# Patient Record
Sex: MALE | Race: WHITE | Employment: OTHER | URBAN - METROPOLITAN AREA
[De-identification: names, ages, dates, MRNs, and addresses within clinical notes are randomized per-mention and may not be internally consistent; named-entity substitution may affect disease eponyms.]

---

## 2019-09-10 ENCOUNTER — HOSPITAL ENCOUNTER (OUTPATIENT)
Dept: NON INVASIVE DIAGNOSTICS | Age: 64
Discharge: HOME | End: 2019-09-10
Payer: MEDICARE

## 2019-09-10 LAB
LV EF: 58 %
LVEF MODALITY: NORMAL

## 2019-09-10 PROCEDURE — 93306 TTE W/DOPPLER COMPLETE: CPT

## 2019-10-03 LAB
AVERAGE GLUCOSE: 114
HBA1C MFR BLD: 5.6 %

## 2020-01-20 ENCOUNTER — OFFICE VISIT (OUTPATIENT)
Dept: FAMILY MEDICINE CLINIC | Age: 65
End: 2020-01-20
Payer: MEDICARE

## 2020-01-20 VITALS
OXYGEN SATURATION: 96 % | HEIGHT: 71 IN | SYSTOLIC BLOOD PRESSURE: 124 MMHG | TEMPERATURE: 98.3 F | RESPIRATION RATE: 18 BRPM | DIASTOLIC BLOOD PRESSURE: 72 MMHG | HEART RATE: 83 BPM | BODY MASS INDEX: 44.1 KG/M2 | WEIGHT: 315 LBS

## 2020-01-20 PROBLEM — N18.5: Status: ACTIVE | Noted: 2020-01-20

## 2020-01-20 PROBLEM — G47.33 OSA ON CPAP: Status: ACTIVE | Noted: 2020-01-20

## 2020-01-20 PROBLEM — Z99.89 OSA ON CPAP: Status: ACTIVE | Noted: 2020-01-20

## 2020-01-20 PROBLEM — Z99.2 DIALYSIS PATIENT (HCC): Status: ACTIVE | Noted: 2020-01-20

## 2020-01-20 PROBLEM — I10 ESSENTIAL HYPERTENSION: Status: ACTIVE | Noted: 2020-01-20

## 2020-01-20 PROBLEM — Q61.3 POLYCYSTIC KIDNEY DISEASE: Status: ACTIVE | Noted: 2020-01-20

## 2020-01-20 PROCEDURE — 99203 OFFICE O/P NEW LOW 30 MIN: CPT | Performed by: FAMILY MEDICINE

## 2020-01-20 PROCEDURE — 3017F COLORECTAL CA SCREEN DOC REV: CPT | Performed by: FAMILY MEDICINE

## 2020-01-20 PROCEDURE — 4040F PNEUMOC VAC/ADMIN/RCVD: CPT | Performed by: FAMILY MEDICINE

## 2020-01-20 PROCEDURE — 1036F TOBACCO NON-USER: CPT | Performed by: FAMILY MEDICINE

## 2020-01-20 PROCEDURE — 1123F ACP DISCUSS/DSCN MKR DOCD: CPT | Performed by: FAMILY MEDICINE

## 2020-01-20 PROCEDURE — G8427 DOCREV CUR MEDS BY ELIG CLIN: HCPCS | Performed by: FAMILY MEDICINE

## 2020-01-20 PROCEDURE — G8417 CALC BMI ABV UP PARAM F/U: HCPCS | Performed by: FAMILY MEDICINE

## 2020-01-20 PROCEDURE — G8482 FLU IMMUNIZE ORDER/ADMIN: HCPCS | Performed by: FAMILY MEDICINE

## 2020-01-20 RX ORDER — CINACALCET 30 MG/1
TABLET, FILM COATED ORAL
COMMUNITY
Start: 2020-01-13

## 2020-01-20 RX ORDER — ROSUVASTATIN CALCIUM 10 MG/1
10 TABLET, COATED ORAL
COMMUNITY
End: 2022-08-17 | Stop reason: SDUPTHER

## 2020-01-20 RX ORDER — GABAPENTIN 300 MG/1
CAPSULE ORAL
COMMUNITY
Start: 2019-12-23 | End: 2020-06-02 | Stop reason: SDUPTHER

## 2020-01-20 RX ORDER — POLYETHYLENE GLYCOL 3350, SODIUM CHLORIDE, SODIUM BICARBONATE, POTASSIUM CHLORIDE 420; 11.2; 5.72; 1.48 G/4L; G/4L; G/4L; G/4L
4000 POWDER, FOR SOLUTION ORAL ONCE
Qty: 4000 ML | Refills: 0 | Status: SHIPPED | OUTPATIENT
Start: 2020-01-20 | End: 2020-01-20

## 2020-01-20 RX ORDER — ALISKIREN 300 MG/1
300 TABLET, FILM COATED ORAL
COMMUNITY
End: 2021-07-15

## 2020-01-20 RX ORDER — ASPIRIN 81 MG/1
TABLET ORAL
COMMUNITY
Start: 2007-04-25

## 2020-01-20 RX ORDER — SEVELAMER HYDROCHLORIDE 800 MG/1
4000 TABLET, FILM COATED ORAL
COMMUNITY
End: 2021-07-15

## 2020-01-20 RX ORDER — TORSEMIDE 100 MG/1
100 TABLET ORAL
COMMUNITY

## 2020-01-20 RX ORDER — AMLODIPINE BESYLATE 5 MG/1
5 TABLET ORAL
COMMUNITY
End: 2021-08-27 | Stop reason: ALTCHOICE

## 2020-01-20 ASSESSMENT — ENCOUNTER SYMPTOMS
RESPIRATORY NEGATIVE: 1
EYES NEGATIVE: 1
GASTROINTESTINAL NEGATIVE: 1
SHORTNESS OF BREATH: 0
ALLERGIC/IMMUNOLOGIC NEGATIVE: 1

## 2020-01-20 ASSESSMENT — PATIENT HEALTH QUESTIONNAIRE - PHQ9
SUM OF ALL RESPONSES TO PHQ9 QUESTIONS 1 & 2: 0
1. LITTLE INTEREST OR PLEASURE IN DOING THINGS: 0
SUM OF ALL RESPONSES TO PHQ QUESTIONS 1-9: 0
2. FEELING DOWN, DEPRESSED OR HOPELESS: 0
SUM OF ALL RESPONSES TO PHQ QUESTIONS 1-9: 0

## 2020-01-20 NOTE — PROGRESS NOTES
Creatinine monitoring  06/01/2018    Potassium monitoring  07/07/2018    Pneumococcal 65+ years Vaccine (1 of 1 - PPSV23) 08/16/2019    Flu vaccine  Completed    Hepatitis C screen  Completed    HIV screen  Completed       Review of Systems     Review of Systems   Constitutional: Negative for activity change, appetite change, chills, fever and unexpected weight change. HENT: Negative. Eyes: Negative. Respiratory: Negative. Negative for shortness of breath. Cardiovascular: Negative. Negative for chest pain and palpitations. Gastrointestinal: Negative. Endocrine: Negative. Genitourinary: Negative. Musculoskeletal: Negative. Skin: Negative. Allergic/Immunologic: Negative. Neurological: Negative. Hematological: Negative. Psychiatric/Behavioral: Negative. Physical Exam  Vitals:    01/20/20 1128   BP: 124/72   Site: Right Lower Arm   Position: Sitting   Cuff Size: Large Adult   Pulse: 83   Resp: 18   Temp: 98.3 °F (36.8 °C)   TempSrc: Oral   SpO2: 96%   Weight: (!) 322 lb (146.1 kg)   Height: 5' 11\" (1.803 m)       Physical Exam  Constitutional:       Appearance: He is well-developed. HENT:      Right Ear: External ear normal.      Left Ear: External ear normal.   Eyes:      Conjunctiva/sclera: Conjunctivae normal.      Pupils: Pupils are equal, round, and reactive to light. Neck:      Musculoskeletal: Normal range of motion and neck supple. Thyroid: No thyromegaly. Cardiovascular:      Rate and Rhythm: Normal rate and regular rhythm. Heart sounds: Normal heart sounds. No murmur. No friction rub. No gallop. Pulmonary:      Effort: Pulmonary effort is normal. No respiratory distress. Breath sounds: Normal breath sounds. No wheezing. Abdominal:      General: Bowel sounds are normal. There is no distension. Palpations: Abdomen is soft. There is no mass. Tenderness: There is no tenderness. There is no guarding or rebound.       Hernia: No hernia is present. Genitourinary:     Penis: Normal.    Musculoskeletal: Normal range of motion. General: No tenderness. Lymphadenopathy:      Cervical: No cervical adenopathy. Skin:     General: Skin is warm and dry. Neurological:      Mental Status: He is alert and oriented to person, place, and time. Cranial Nerves: No cranial nerve deficit. Coordination: Coordination normal.         Assessment   Diagnosis Orders   1. Essential hypertension     2. CRI (chronic renal insufficiency), stage 5 (HCC)     3. Polycystic kidney disease     4. Bilateral leg edema  US ANKLE BRACHIAL INDEX   5. Colon cancer screening  Ambulatory referral to Gastroenterology   6. Other specified symptoms and signs involving the circulatory and respiratory systems   US ANKLE BRACHIAL INDEX   7. JENNIFER on CPAP     8. Dialysis patient [facility])       Problem List     JENNIFER on CPAP    Polycystic kidney disease    Essential hypertension - Primary    Relevant Medications    torsemide (DEMADEX) 100 MG tablet    amLODIPine (NORVASC) 5 MG tablet    aliskiren (TEKTURNA) 300 MG tablet    rosuvastatin (CRESTOR) 10 MG tablet    aspirin (ECOTRIN LOW STRENGTH) 81 MG EC tablet    CRI (chronic renal insufficiency), stage 5 ([address]    Dialysis patient ([address]          Plan  Orders Placed This Encounter   Procedures    US ANKLE BRACHIAL INDEX     Standing Status:   Future     Standing Expiration Date:   1/20/2021    Ambulatory referral to Gastroenterology     Referral Priority:   Routine     Referral Type:   Eval and Treat     Referral Reason:   Specialty Services Required     Referred to Provider:   Aleksandra Swanson MD     Requested Specialty:   Gastroenterology     Number of Visits Requested:   1     Orders Placed This Encounter   Medications    polyethylene glycol-electrolytes (TRILYTE) 420 g solution     Sig: Take 4,000 mLs by mouth once for 1 dose     Dispense:  4000 mL     Refill:  0     No follow-ups on file.   Radha Marie MD

## 2020-02-03 ENCOUNTER — HOSPITAL ENCOUNTER (OUTPATIENT)
Dept: ULTRASOUND IMAGING | Age: 65
Discharge: HOME | End: 2020-02-05
Payer: COMMERCIAL

## 2020-02-03 PROCEDURE — 93923 UPR/LXTR ART STDY 3+ LVLS: CPT | Performed by: INTERNAL MEDICINE

## 2020-02-03 PROCEDURE — 93923 UPR/LXTR ART STDY 3+ LVLS: CPT

## 2020-02-04 ENCOUNTER — TELEPHONE (OUTPATIENT)
Dept: FAMILY MEDICINE CLINIC | Age: 65
End: 2020-02-04

## 2020-02-06 ENCOUNTER — ANESTHESIA (OUTPATIENT)
Dept: ENDOSCOPY | Age: 65
End: 2020-02-06
Payer: MEDICARE

## 2020-02-06 ENCOUNTER — HOSPITAL ENCOUNTER (OUTPATIENT)
Age: 65
Setting detail: OUTPATIENT SURGERY
Discharge: HOME | End: 2020-02-06
Attending: SPECIALIST | Admitting: SPECIALIST
Payer: MEDICARE

## 2020-02-06 ENCOUNTER — ANESTHESIA EVENT (OUTPATIENT)
Dept: ENDOSCOPY | Age: 65
End: 2020-02-06
Payer: MEDICARE

## 2020-02-06 VITALS
DIASTOLIC BLOOD PRESSURE: 54 MMHG | TEMPERATURE: 98.6 F | OXYGEN SATURATION: 92 % | SYSTOLIC BLOOD PRESSURE: 94 MMHG | RESPIRATION RATE: 24 BRPM

## 2020-02-06 VITALS
TEMPERATURE: 97.8 F | SYSTOLIC BLOOD PRESSURE: 119 MMHG | OXYGEN SATURATION: 93 % | HEIGHT: 71 IN | RESPIRATION RATE: 16 BRPM | HEART RATE: 63 BPM | DIASTOLIC BLOOD PRESSURE: 61 MMHG | WEIGHT: 315 LBS | BODY MASS INDEX: 44.1 KG/M2

## 2020-02-06 PROCEDURE — 3700000000 HC ANESTHESIA ATTENDED CARE: Performed by: SPECIALIST

## 2020-02-06 PROCEDURE — 3700000001 HC ADD 15 MINUTES (ANESTHESIA): Performed by: SPECIALIST

## 2020-02-06 PROCEDURE — 7100000010 HC PHASE II RECOVERY - FIRST 15 MIN: Performed by: SPECIALIST

## 2020-02-06 PROCEDURE — 2500000003 HC RX 250 WO HCPCS: Performed by: NURSE ANESTHETIST, CERTIFIED REGISTERED

## 2020-02-06 PROCEDURE — 3609027000 HC COLONOSCOPY: Performed by: SPECIALIST

## 2020-02-06 PROCEDURE — 2580000003 HC RX 258: Performed by: NURSE ANESTHETIST, CERTIFIED REGISTERED

## 2020-02-06 PROCEDURE — 6360000002 HC RX W HCPCS: Performed by: NURSE ANESTHETIST, CERTIFIED REGISTERED

## 2020-02-06 PROCEDURE — 7100000011 HC PHASE II RECOVERY - ADDTL 15 MIN: Performed by: SPECIALIST

## 2020-02-06 PROCEDURE — 2580000003 HC RX 258: Performed by: SPECIALIST

## 2020-02-06 PROCEDURE — 45385 COLONOSCOPY W/LESION REMOVAL: CPT | Performed by: SPECIALIST

## 2020-02-06 PROCEDURE — 88305 TISSUE EXAM BY PATHOLOGIST: CPT

## 2020-02-06 RX ORDER — SODIUM CHLORIDE 9 MG/ML
INJECTION, SOLUTION INTRAVENOUS CONTINUOUS
Status: DISCONTINUED | OUTPATIENT
Start: 2020-02-06 | End: 2020-02-06 | Stop reason: HOSPADM

## 2020-02-06 RX ORDER — LIDOCAINE HYDROCHLORIDE 10 MG/ML
1 INJECTION, SOLUTION EPIDURAL; INFILTRATION; INTRACAUDAL; PERINEURAL
Status: DISCONTINUED | OUTPATIENT
Start: 2020-02-06 | End: 2020-02-06 | Stop reason: HOSPADM

## 2020-02-06 RX ORDER — SODIUM CHLORIDE 0.9 % (FLUSH) 0.9 %
10 SYRINGE (ML) INJECTION PRN
Status: DISCONTINUED | OUTPATIENT
Start: 2020-02-06 | End: 2020-02-06 | Stop reason: HOSPADM

## 2020-02-06 RX ORDER — PROPOFOL 10 MG/ML
INJECTION, EMULSION INTRAVENOUS PRN
Status: DISCONTINUED | OUTPATIENT
Start: 2020-02-06 | End: 2020-02-06 | Stop reason: SDUPTHER

## 2020-02-06 RX ORDER — LIDOCAINE HYDROCHLORIDE 20 MG/ML
INJECTION, SOLUTION INFILTRATION; PERINEURAL PRN
Status: DISCONTINUED | OUTPATIENT
Start: 2020-02-06 | End: 2020-02-06 | Stop reason: SDUPTHER

## 2020-02-06 RX ORDER — SODIUM CHLORIDE 0.9 % (FLUSH) 0.9 %
10 SYRINGE (ML) INJECTION EVERY 12 HOURS SCHEDULED
Status: DISCONTINUED | OUTPATIENT
Start: 2020-02-06 | End: 2020-02-06 | Stop reason: HOSPADM

## 2020-02-06 RX ADMIN — SODIUM CHLORIDE: 9 INJECTION, SOLUTION INTRAVENOUS at 07:52

## 2020-02-06 RX ADMIN — PROPOFOL 50 MG: 10 INJECTION, EMULSION INTRAVENOUS at 08:07

## 2020-02-06 RX ADMIN — PROPOFOL 50 MG: 10 INJECTION, EMULSION INTRAVENOUS at 08:12

## 2020-02-06 RX ADMIN — PHENYLEPHRINE HYDROCHLORIDE 100 MCG: 10 INJECTION INTRAVENOUS at 08:31

## 2020-02-06 RX ADMIN — PROPOFOL 100 MG: 10 INJECTION, EMULSION INTRAVENOUS at 08:04

## 2020-02-06 RX ADMIN — LIDOCAINE HYDROCHLORIDE 50 MG: 20 INJECTION, SOLUTION INFILTRATION; PERINEURAL at 08:04

## 2020-02-06 ASSESSMENT — PAIN - FUNCTIONAL ASSESSMENT: PAIN_FUNCTIONAL_ASSESSMENT: 0-10

## 2020-02-06 NOTE — ADDENDUM NOTE
Addendum  created 02/06/20 1207 by YOGESH Muñiz CRNA    Intraprocedure Meds edited, Orders acknowledged in Narrator

## 2020-04-02 ENCOUNTER — TELEPHONE (OUTPATIENT)
Dept: FAMILY MEDICINE CLINIC | Age: 65
End: 2020-04-02

## 2020-06-02 ENCOUNTER — VIRTUAL VISIT (OUTPATIENT)
Dept: FAMILY MEDICINE CLINIC | Age: 65
End: 2020-06-02
Payer: MEDICARE

## 2020-06-02 PROBLEM — E66.01 MORBIDLY OBESE (HCC): Status: ACTIVE | Noted: 2020-06-02

## 2020-06-02 PROCEDURE — 3017F COLORECTAL CA SCREEN DOC REV: CPT | Performed by: NURSE PRACTITIONER

## 2020-06-02 PROCEDURE — G0438 PPPS, INITIAL VISIT: HCPCS | Performed by: NURSE PRACTITIONER

## 2020-06-02 PROCEDURE — 1123F ACP DISCUSS/DSCN MKR DOCD: CPT | Performed by: NURSE PRACTITIONER

## 2020-06-02 PROCEDURE — 4040F PNEUMOC VAC/ADMIN/RCVD: CPT | Performed by: NURSE PRACTITIONER

## 2020-06-02 RX ORDER — GABAPENTIN 300 MG/1
CAPSULE ORAL
Qty: 36 CAPSULE | Refills: 3 | Status: SHIPPED | OUTPATIENT
Start: 2020-06-02 | End: 2020-07-20 | Stop reason: SDUPTHER

## 2020-06-02 RX ORDER — ICOSAPENT ETHYL 1000 MG/1
CAPSULE ORAL
COMMUNITY
Start: 2020-05-13

## 2020-06-02 ASSESSMENT — PATIENT HEALTH QUESTIONNAIRE - PHQ9
SUM OF ALL RESPONSES TO PHQ QUESTIONS 1-9: 0
SUM OF ALL RESPONSES TO PHQ QUESTIONS 1-9: 0

## 2020-06-02 ASSESSMENT — LIFESTYLE VARIABLES: HOW OFTEN DO YOU HAVE A DRINK CONTAINING ALCOHOL: 0

## 2020-06-02 NOTE — PROGRESS NOTES
Medicare Annual Wellness Visit  Are Name: Dmitry Call Date: 2020   MRN: [id number] Sex: Male   Age: 71 y.o. Ethnicity: Non-/Non    : 1955 Race: Ashley Vieyra is here for Medicare AWV    Screenings for behavioral, psychosocial and functional/safety risks, and cognitive dysfunction are all negative except as indicated below. These results, as well as other patient data from the [address] form, are documented in Flowsheets linked to this Encounter. No Known Allergies      Prior to Visit Medications    Medication Sig Taking?  Authorizing Provider   gabapentin (NEURONTIN) 300 MG capsule TK ONE C PO HS AFTER DIALYSIS 3 DAYS A WEEK Yes YOGESH Wagoner - CNP   VASCEPA 1 g CAPS capsule TK 2 CS PO BID  Historical Provider, MD   torsemide (DEMADEX) 100 MG tablet Take 100 mg by mouth  Historical Provider, MD   amLODIPine (NORVASC) 5 MG tablet Take 5 mg by mouth  Historical Provider, MD   aliskiren (TEKTURNA) 300 MG tablet Take 300 mg by mouth  Historical Provider, MD   rosuvastatin (CRESTOR) 10 MG tablet Take 10 mg by mouth  Historical Provider, MD   sevelamer hcl (RENAGEL) 800 MG tablet Take 4,000 mg by mouth  Historical Provider, MD   cinacalcet (SENSIPAR) 30 MG tablet TAKE 1 TABLET BY MOUTH ONCE A DAY AS DIRECTED WITH THE EVENING MEAL  Historical Provider, MD   aspirin (ECOTRIN LOW STRENGTH) 81 MG EC tablet Take by mouth  Historical Provider, MD         Past Medical History:   Diagnosis Date    Hemodialysis patient ([facility] 2008    MWF    Hyperlipidemia     Hypertension     Renal failure     Sleep apnea        Past Surgical History:   Procedure Laterality Date    COLONOSCOPY      Polyps , none in     COLONOSCOPY N/A 2020    COLORECTAL CANCER SCREENING, HIGH RISK performed by Yimi Mcclure MD at [address]     TONSILLECTOMY AND ADENOIDECTOMY      UVULECTOMY         No family history on file. CareTeam (Including outside providers/suppliers regularly involved in providing care):   Patient Care Team:  Kurt Mcduffie MD as PCP - General (Family Medicine)  Kurt Mcduffie MD as PCP - [facility] Empaneled Provider    Wt Readings from Last 3 Encounters:   02/06/20 (!) 325 lb (147.4 kg)   01/20/20 (!) 322 lb (146.1 kg)     There were no vitals filed for this visit. There is no height or weight on file to calculate BMI. Based upon direct observation of the patient, evaluation of cognition reveals recent and remote memory intact. Patient's complete Health Risk Assessment and screening values have been reviewed and are found in Flowsheets. The following problems were reviewed today and where indicated follow up appointments were made and/or referrals ordered. Positive Risk Factor Screenings with Interventions:     Health Habits/Nutrition:  Health Habits/Nutrition  Do you exercise for at least 20 minutes 2-3 times per week?: Yes  Have you lost any weight without trying in the past 3 months?: No  Do you eat fewer than 2 meals per day?: No  Have you seen a dentist within the past year?: Yes  There is no height or weight on file to calculate BMI.   Health Habits/Nutrition Interventions:  · Nutritional issues:  educational materials for healthy, well-balanced diet provided    Personalized Preventive Plan   Current Health Maintenance Status  Immunization History   Administered Date(s) Administered    Hepatitis B 06/26/2007, 07/24/2007, 01/22/2008    Hepatitis B Ped/Adol (Engerix-B, Recombivax HB) 06/26/2007, 07/24/2007, 01/22/2008    Hepatitis B vaccine 06/26/2007, 07/24/2007, 01/22/2008    Influenza, High Dose (Fluzone 65 yrs and older) 09/25/2019    PPD Test 05/30/2007    Pneumococcal Polysaccharide ([id number]) 07/05/2007        Health Maintenance   Topic Date Due    DTaP/Tdap/Td vaccine (1 - Tdap) 08/16/1973    Diabetes screen  08/16/1994    Shingles Vaccine (1 of 2) 08/16/2004

## 2020-06-04 ENCOUNTER — TELEPHONE (OUTPATIENT)
Dept: FAMILY MEDICINE CLINIC | Age: 65
End: 2020-06-04

## 2020-06-04 NOTE — TELEPHONE ENCOUNTER
Maximus Garcia from [address] called. She needs clarification on the gabapentin. Did you want patient to start taking the 300 mg? Previously it had been 100 mg. Direct line to Maximus Garcia is [phone] and she has a secure voicemail. Please use first and last name when leaving a message. Thank you.

## 2020-06-05 NOTE — TELEPHONE ENCOUNTER
Our records and [facility] report shows 300mg. Patient was getting this filled at [facility] for a while. The 300mg is correct.

## 2020-07-16 ENCOUNTER — TELEPHONE (OUTPATIENT)
Dept: FAMILY MEDICINE CLINIC | Age: 65
End: 2020-07-16

## 2020-07-16 DIAGNOSIS — I10 ESSENTIAL HYPERTENSION: ICD-10-CM

## 2020-07-16 DIAGNOSIS — Z12.5 SCREENING FOR PROSTATE CANCER: ICD-10-CM

## 2020-07-16 LAB
ALBUMIN SERPL-MCNC: 4.1 G/DL (ref 3.5–4.6)
ALP BLD-CCNC: 55 U/L (ref 35–104)
ALT SERPL-CCNC: 9 U/L (ref 0–41)
ANION GAP SERPL CALCULATED.3IONS-SCNC: 14 MEQ/L (ref 9–15)
AST SERPL-CCNC: 17 U/L (ref 0–40)
BASOPHILS ABSOLUTE: 0.1 K/UL (ref 0–0.2)
BASOPHILS RELATIVE PERCENT: 1.1 %
BILIRUB SERPL-MCNC: 0.6 MG/DL (ref 0.2–0.7)
BUN BLDV-MCNC: 19 MG/DL (ref 8–23)
CALCIUM SERPL-MCNC: 9.9 MG/DL (ref 8.5–9.9)
CHLORIDE BLD-SCNC: 95 MEQ/L (ref 95–107)
CO2: 30 MEQ/L (ref 20–31)
CREAT SERPL-MCNC: 7 MG/DL (ref 0.7–1.2)
EOSINOPHILS ABSOLUTE: 0.2 K/UL (ref 0–0.7)
EOSINOPHILS RELATIVE PERCENT: 2.7 %
GFR AFRICAN AMERICAN: 9.6
GFR NON-AFRICAN AMERICAN: 7.9
GLOBULIN: 3.1 G/DL (ref 2.3–3.5)
GLUCOSE BLD-MCNC: 111 MG/DL (ref 70–99)
HCT VFR BLD CALC: 47.1 % (ref 42–52)
HEMOGLOBIN: 14.7 G/DL (ref 14–18)
LYMPHOCYTES ABSOLUTE: 1.5 K/UL (ref 1–4.8)
LYMPHOCYTES RELATIVE PERCENT: 22.9 %
MCH RBC QN AUTO: 28.4 PG (ref 27–31.3)
MCHC RBC AUTO-ENTMCNC: 31.3 % (ref 33–37)
MCV RBC AUTO: 90.8 FL (ref 80–100)
MONOCYTES ABSOLUTE: 1 K/UL (ref 0.2–0.8)
MONOCYTES RELATIVE PERCENT: 15.5 %
NEUTROPHILS ABSOLUTE: 3.8 K/UL (ref 1.4–6.5)
NEUTROPHILS RELATIVE PERCENT: 57.8 %
PDW BLD-RTO: 18.7 % (ref 11.5–14.5)
PLATELET # BLD: 241 K/UL (ref 130–400)
POTASSIUM SERPL-SCNC: 4.7 MEQ/L (ref 3.4–4.9)
PROSTATE SPECIFIC ANTIGEN: 2.52 NG/ML (ref 0–5.4)
RBC # BLD: 5.19 M/UL (ref 4.7–6.1)
SODIUM BLD-SCNC: 139 MEQ/L (ref 135–144)
TOTAL PROTEIN: 7.2 G/DL (ref 6.3–8)
WBC # BLD: 6.6 K/UL (ref 4.8–10.8)

## 2020-07-16 NOTE — TELEPHONE ENCOUNTER
[facility] lab is calling with Critical Results.  The patient's creatnin came back as 7.0     Notifying a provider in office at time of phone call

## 2020-07-20 ENCOUNTER — OFFICE VISIT (OUTPATIENT)
Dept: FAMILY MEDICINE CLINIC | Age: 65
End: 2020-07-20
Payer: MEDICARE

## 2020-07-20 VITALS
RESPIRATION RATE: 18 BRPM | TEMPERATURE: 97.6 F | BODY MASS INDEX: 44.1 KG/M2 | HEIGHT: 71 IN | DIASTOLIC BLOOD PRESSURE: 60 MMHG | WEIGHT: 315 LBS | OXYGEN SATURATION: 95 % | SYSTOLIC BLOOD PRESSURE: 118 MMHG | HEART RATE: 79 BPM

## 2020-07-20 PROCEDURE — 1123F ACP DISCUSS/DSCN MKR DOCD: CPT | Performed by: FAMILY MEDICINE

## 2020-07-20 PROCEDURE — 3017F COLORECTAL CA SCREEN DOC REV: CPT | Performed by: FAMILY MEDICINE

## 2020-07-20 PROCEDURE — G8417 CALC BMI ABV UP PARAM F/U: HCPCS | Performed by: FAMILY MEDICINE

## 2020-07-20 PROCEDURE — 4040F PNEUMOC VAC/ADMIN/RCVD: CPT | Performed by: FAMILY MEDICINE

## 2020-07-20 PROCEDURE — 1036F TOBACCO NON-USER: CPT | Performed by: FAMILY MEDICINE

## 2020-07-20 PROCEDURE — G8427 DOCREV CUR MEDS BY ELIG CLIN: HCPCS | Performed by: FAMILY MEDICINE

## 2020-07-20 PROCEDURE — 99213 OFFICE O/P EST LOW 20 MIN: CPT | Performed by: FAMILY MEDICINE

## 2020-07-20 RX ORDER — GABAPENTIN 300 MG/1
CAPSULE ORAL
Qty: 60 CAPSULE | Refills: 3 | Status: SHIPPED | OUTPATIENT
Start: 2020-07-20 | End: 2021-01-21 | Stop reason: SDUPTHER

## 2020-07-20 ASSESSMENT — ENCOUNTER SYMPTOMS
RESPIRATORY NEGATIVE: 1
EYES NEGATIVE: 1
GASTROINTESTINAL NEGATIVE: 1
ALLERGIC/IMMUNOLOGIC NEGATIVE: 1
SHORTNESS OF BREATH: 0

## 2020-07-20 NOTE — PROGRESS NOTES
Patient is seen in follow up for   Chief Complaint   Patient presents with    Hypertension     Pt. is here for a 6 month f/u on HTN. Hypertension   This is a chronic problem. The problem is unchanged. The problem is controlled. Pertinent negatives include no chest pain, palpitations or shortness of breath. There are no associated agents to hypertension. Risk factors for coronary artery disease include male gender. Past treatments include angiotensin blockers and calcium channel blockers. The current treatment provides significant improvement. There are no compliance problems. Past Medical History:   Diagnosis Date    Hemodialysis patient ([facility] 05/2008    MWF    Hyperlipidemia     Hypertension     Renal failure     Sleep apnea      Patient Active Problem List    Diagnosis Date Noted    Morbidly obese ([facility] 06/02/2020    Adenomatous polyp of descending colon     JENNIFER on CPAP 01/20/2020    Polycystic kidney disease 01/20/2020    Essential hypertension 01/20/2020    CRI (chronic renal insufficiency), stage 5 ([facility] 01/20/2020    Dialysis patient ([facility] 01/20/2020     Past Surgical History:   Procedure Laterality Date    COLONOSCOPY      Polyps 2010, none in 2015    COLONOSCOPY N/A 2/6/2020    COLORECTAL CANCER SCREENING, HIGH RISK performed by Arnaldo Herrera MD at [address] 2008    TONSILLECTOMY AND ADENOIDECTOMY      UVULECTOMY       No family history on file.   Social History     Socioeconomic History    Marital status:      Spouse name: None    Number of children: None    Years of education: None    Highest education level: None   Occupational History    None   Social Needs    Financial resource strain: None    Food insecurity     Worry: None     Inability: None    Transportation needs     Medical: None     Non-medical: None   Tobacco Use    Smoking status: Never Smoker    Smokeless tobacco: Never Used   Substance and Sexual Activity    Alcohol use: Not Currently    Drug use: Not Currently    Sexual activity: None   Lifestyle    Physical activity     Days per week: None     Minutes per session: None    Stress: None   Relationships    Social connections     Talks on phone: None     Gets together: None     Attends Restorationist service: None     Active member of club or organization: None     Attends meetings of clubs or organizations: None     Relationship status: None    Intimate partner violence     Fear of current or ex partner: None     Emotionally abused: None     Physically abused: None     Forced sexual activity: None   Other Topics Concern    None   Social History Narrative    None     Current Outpatient Medications   Medication Sig Dispense Refill    VASCEPA 1 g CAPS capsule TK 2 CS PO BID      gabapentin (NEURONTIN) 300 MG capsule TK ONE C PO HS AFTER DIALYSIS 3 DAYS A WEEK 36 capsule 3    torsemide (DEMADEX) 100 MG tablet Take 100 mg by mouth      amLODIPine (NORVASC) 5 MG tablet Take 5 mg by mouth      aliskiren (TEKTURNA) 300 MG tablet Take 300 mg by mouth      rosuvastatin (CRESTOR) 10 MG tablet Take 10 mg by mouth      sevelamer hcl (RENAGEL) 800 MG tablet Take 4,000 mg by mouth      cinacalcet (SENSIPAR) 30 MG tablet TAKE 1 TABLET BY MOUTH ONCE A DAY AS DIRECTED WITH THE EVENING MEAL      aspirin (ECOTRIN LOW STRENGTH) 81 MG EC tablet Take by mouth       No current facility-administered medications for this visit.       Current Outpatient Medications on File Prior to Visit   Medication Sig Dispense Refill    VASCEPA 1 g CAPS capsule TK 2 CS PO BID      gabapentin (NEURONTIN) 300 MG capsule TK ONE C PO HS AFTER DIALYSIS 3 DAYS A WEEK 36 capsule 3    torsemide (DEMADEX) 100 MG tablet Take 100 mg by mouth      amLODIPine (NORVASC) 5 MG tablet Take 5 mg by mouth      aliskiren (TEKTURNA) 300 MG tablet Take 300 mg by mouth      rosuvastatin (CRESTOR) 10 MG tablet Take 10 mg by mouth      sevelamer hcl (RENAGEL) 800 MG tablet Take 4,000 mg by mouth      cinacalcet (SENSIPAR) 30 MG tablet TAKE 1 TABLET BY MOUTH ONCE A DAY AS DIRECTED WITH THE EVENING MEAL      aspirin (ECOTRIN LOW STRENGTH) 81 MG EC tablet Take by mouth       No current facility-administered medications on file prior to visit. No Known Allergies  Health Maintenance   Topic Date Due    DTaP/Tdap/Td vaccine (1 - Tdap) 08/16/1973    Shingles Vaccine (1 of 2) 08/16/2004    Pneumococcal 65+ yrs at Risk Vaccine (1 of 2 - PCV13) 08/16/2019    Flu vaccine (1) 09/01/2020    Lipid screen  02/06/2021    Annual Wellness Visit (AWV)  06/03/2021    Potassium monitoring  07/16/2021    Creatinine monitoring  07/16/2021    Diabetes screen  10/03/2022    Colon cancer screen colonoscopy  02/06/2025    Hepatitis C screen  Completed    HIV screen  Completed    Hepatitis A vaccine  Aged Out    Hepatitis B vaccine  Aged Out    Hib vaccine  Aged Out    Meningococcal (ACWY) vaccine  Aged Out       Review of Systems     Review of Systems   Constitutional: Negative for activity change, appetite change, chills, fever and unexpected weight change. HENT: Negative. Eyes: Negative. Respiratory: Negative. Negative for shortness of breath. Cardiovascular: Negative. Negative for chest pain and palpitations. Gastrointestinal: Negative. Endocrine: Negative. Genitourinary: Negative. Musculoskeletal: Negative. Skin: Negative. Allergic/Immunologic: Negative. Neurological: Negative. Hematological: Negative. Psychiatric/Behavioral: Negative. Physical Exam  Vitals:    07/20/20 0958   BP: 118/60   Site: Left Upper Arm   Position: Sitting   Cuff Size: Large Adult   Pulse: 79   Resp: 18   Temp: 97.6 °F (36.4 °C)   TempSrc: Temporal   SpO2: 95%   Weight: (!) 318 lb (144.2 kg)   Height: 5' 11\" (1.803 m)       Physical Exam  Constitutional:       Appearance: He is well-developed.    HENT:      Right Ear: External ear normal.

## 2021-01-21 ENCOUNTER — OFFICE VISIT (OUTPATIENT)
Dept: FAMILY MEDICINE CLINIC | Age: 66
End: 2021-01-21
Payer: MEDICARE

## 2021-01-21 VITALS
SYSTOLIC BLOOD PRESSURE: 104 MMHG | HEART RATE: 80 BPM | WEIGHT: 315 LBS | HEIGHT: 71 IN | BODY MASS INDEX: 44.1 KG/M2 | TEMPERATURE: 98.9 F | DIASTOLIC BLOOD PRESSURE: 62 MMHG

## 2021-01-21 DIAGNOSIS — Q61.3 POLYCYSTIC KIDNEY DISEASE: ICD-10-CM

## 2021-01-21 DIAGNOSIS — M48.062 SPINAL STENOSIS OF LUMBAR REGION WITH NEUROGENIC CLAUDICATION: ICD-10-CM

## 2021-01-21 DIAGNOSIS — E66.01 MORBIDLY OBESE (HCC): ICD-10-CM

## 2021-01-21 DIAGNOSIS — G62.9 NEUROPATHY: ICD-10-CM

## 2021-01-21 DIAGNOSIS — Z99.2 DIALYSIS PATIENT (HCC): Primary | ICD-10-CM

## 2021-01-21 DIAGNOSIS — K51.418 INFLAMMATORY POLYPS OF COLON WITH OTHER COMPLICATION (HCC): ICD-10-CM

## 2021-01-21 PROCEDURE — G8427 DOCREV CUR MEDS BY ELIG CLIN: HCPCS | Performed by: FAMILY MEDICINE

## 2021-01-21 PROCEDURE — 1123F ACP DISCUSS/DSCN MKR DOCD: CPT | Performed by: FAMILY MEDICINE

## 2021-01-21 PROCEDURE — 99214 OFFICE O/P EST MOD 30 MIN: CPT | Performed by: FAMILY MEDICINE

## 2021-01-21 PROCEDURE — G8482 FLU IMMUNIZE ORDER/ADMIN: HCPCS | Performed by: FAMILY MEDICINE

## 2021-01-21 PROCEDURE — 1036F TOBACCO NON-USER: CPT | Performed by: FAMILY MEDICINE

## 2021-01-21 PROCEDURE — 4040F PNEUMOC VAC/ADMIN/RCVD: CPT | Performed by: FAMILY MEDICINE

## 2021-01-21 PROCEDURE — G8417 CALC BMI ABV UP PARAM F/U: HCPCS | Performed by: FAMILY MEDICINE

## 2021-01-21 PROCEDURE — 3017F COLORECTAL CA SCREEN DOC REV: CPT | Performed by: FAMILY MEDICINE

## 2021-01-21 RX ORDER — GABAPENTIN 300 MG/1
CAPSULE ORAL
Qty: 60 CAPSULE | Refills: 3 | Status: SHIPPED | OUTPATIENT
Start: 2021-01-21 | End: 2021-08-02 | Stop reason: SDUPTHER

## 2021-01-21 SDOH — ECONOMIC STABILITY: FOOD INSECURITY: WITHIN THE PAST 12 MONTHS, THE FOOD YOU BOUGHT JUST DIDN'T LAST AND YOU DIDN'T HAVE MONEY TO GET MORE.: NEVER TRUE

## 2021-01-21 SDOH — ECONOMIC STABILITY: TRANSPORTATION INSECURITY
IN THE PAST 12 MONTHS, HAS LACK OF TRANSPORTATION KEPT YOU FROM MEETINGS, WORK, OR FROM GETTING THINGS NEEDED FOR DAILY LIVING?: NO

## 2021-01-21 ASSESSMENT — ENCOUNTER SYMPTOMS
RESPIRATORY NEGATIVE: 1
GASTROINTESTINAL NEGATIVE: 1
ALLERGIC/IMMUNOLOGIC NEGATIVE: 1
EYES NEGATIVE: 1
SHORTNESS OF BREATH: 0

## 2021-01-21 ASSESSMENT — PATIENT HEALTH QUESTIONNAIRE - PHQ9
SUM OF ALL RESPONSES TO PHQ9 QUESTIONS 1 & 2: 0
SUM OF ALL RESPONSES TO PHQ QUESTIONS 1-9: 0

## 2021-01-21 NOTE — PROGRESS NOTES
Patient is seen in follow up for   Chief Complaint   Patient presents with    Hypertension     6 mo checkup     Hypertension  This is a chronic problem. The current episode started more than 1 year ago. The problem is unchanged. The problem is controlled. Pertinent negatives include no chest pain, palpitations or shortness of breath. There are no associated agents to hypertension. Risk factors for coronary artery disease include male gender. Past treatments include angiotensin blockers and calcium channel blockers. The current treatment provides moderate improvement. There are no compliance problems. pain in legs with walking and standing. Past Medical History:   Diagnosis Date    Hemodialysis patient ([address] 05/2008    MWF    Hyperlipidemia     Hypertension     Renal failure     Sleep apnea      Patient Active Problem List    Diagnosis Date Noted    Inflammatory polyps of colon with other complication ([facility] 45/91/3799    Morbidly obese ([address] 06/02/2020    Adenomatous polyp of descending colon     JENNIFER on CPAP 01/20/2020    Polycystic kidney disease 01/20/2020    Essential hypertension 01/20/2020    CRI (chronic renal insufficiency), stage 5 ([facility] 01/20/2020    Dialysis patient ([facility] 01/20/2020     Past Surgical History:   Procedure Laterality Date    COLONOSCOPY      Polyps 2010, none in 2015    COLONOSCOPY N/A 2/6/2020    COLORECTAL CANCER SCREENING, HIGH RISK performed by Kristin Toledo MD at [address]    TONSILLECTOMY AND ADENOIDECTOMY      UVULECTOMY       No family history on file.   Social History     Socioeconomic History    Marital status:      Spouse name: None    Number of children: None    Years of education: None    Highest education level: None   Occupational History    None   Social Needs    Financial resource strain: Not hard at all   [place] insecurity     Worry: Never true     Inability: Never true  rosuvastatin (CRESTOR) 10 MG tablet Take 10 mg by mouth      sevelamer hcl (RENAGEL) 800 MG tablet Take 4,000 mg by mouth      cinacalcet (SENSIPAR) 30 MG tablet TAKE 1 TABLET BY MOUTH ONCE A DAY AS DIRECTED WITH THE EVENING MEAL      aspirin (ECOTRIN LOW STRENGTH) 81 MG EC tablet Take by mouth       No current facility-administered medications on file prior to visit. No Known Allergies  Health Maintenance   Topic Date Due    Hepatitis B vaccine (1 of 3 - Risk Recombivax 3-dose series) 08/16/1973    DTaP/Tdap/Td vaccine (1 - Tdap) 08/16/1973    Shingles Vaccine (1 of 2) 08/16/2004    Pneumococcal 65+ yrs at Risk Vaccine (1 of 2 - PCV13) 08/16/2019    Lipid screen  02/06/2021    Annual Wellness Visit (AWV)  06/03/2021    Potassium monitoring  07/16/2021    Creatinine monitoring  07/16/2021    Diabetes screen  10/03/2022    Colon cancer screen colonoscopy  02/06/2025    Flu vaccine  Completed    Hepatitis C screen  Completed    Hepatitis A vaccine  Aged Out    Hib vaccine  Aged Out    Meningococcal (ACWY) vaccine  Aged Out       Review of Systems     Review of Systems   Constitutional: Negative for activity change, appetite change, chills, fever and unexpected weight change. HENT: Negative. Eyes: Negative. Respiratory: Negative. Negative for shortness of breath. Cardiovascular: Negative. Negative for chest pain and palpitations. Gastrointestinal: Negative. Endocrine: Negative. Genitourinary: Negative. Musculoskeletal: Negative. Skin: Negative. Allergic/Immunologic: Negative. Neurological: Negative. Hematological: Negative. Psychiatric/Behavioral: Negative.         Physical Exam  Vitals:    01/21/21 0958   BP: 104/62   Site: Right Upper Arm   Position: Sitting   Cuff Size: Large Adult   Pulse: 80   Temp: 98.9 °F (37.2 °C)   TempSrc: Oral   Weight: (!) 315 lb 3.2 oz (143 kg)   Height: 5' 11\" (1.803 m)       Physical Exam  Constitutional: Appearance: He is well-developed. HENT:      Right Ear: External ear normal.      Left Ear: External ear normal.   Eyes:      Conjunctiva/sclera: Conjunctivae normal.      Pupils: Pupils are equal, round, and reactive to light. Neck:      Musculoskeletal: Normal range of motion and neck supple. Thyroid: No thyromegaly. Cardiovascular:      Rate and Rhythm: Normal rate and regular rhythm. Heart sounds: Normal heart sounds. No murmur. No friction rub. No gallop. Pulmonary:      Effort: Pulmonary effort is normal. No respiratory distress. Breath sounds: Normal breath sounds. No wheezing. Abdominal:      General: Bowel sounds are normal. There is no distension. Palpations: Abdomen is soft. There is no mass. Tenderness: There is no abdominal tenderness. There is no guarding or rebound. Hernia: No hernia is present. Genitourinary:     Penis: Normal.    Musculoskeletal: Normal range of motion. General: No tenderness. Lymphadenopathy:      Cervical: No cervical adenopathy. Skin:     General: Skin is warm and dry. Neurological:      Mental Status: He is alert and oriented to person, place, and time. Cranial Nerves: No cranial nerve deficit. Coordination: Coordination normal.         Assessment   Diagnosis Orders   1. Dialysis patient ([address]     2. Neuropathy  gabapentin (NEURONTIN) 300 MG capsule   3. Polycystic kidney disease     4. Inflammatory polyps of colon with other complication ([address]     5.  Morbidly obese ([address]     6. Spinal stenosis of lumbar region with neurogenic claudication  XR LUMBAR SPINE (MIN 4 VIEWS)    MRI LUMBAR SPINE WO CONTRAST     Problem List     Polycystic kidney disease    Dialysis patient ([address] - Primary    Morbidly obese ([address]    Inflammatory polyps of colon with other complication ([address]          Plan  Orders Placed This Encounter   Procedures    XR LUMBAR SPINE (MIN 4 VIEWS)     Standing Status:   Future Standing Expiration Date:   1/21/2022    MRI LUMBAR SPINE WO CONTRAST     Standing Status:   Future     Standing Expiration Date:   1/21/2022     Orders Placed This Encounter   Medications    gabapentin (NEURONTIN) 300 MG capsule     Sig: TK ONE C PO HS AFTER DIALYSIS 5 DAYS A WEEK     Dispense:  60 capsule     Refill:  3     No follow-ups on file.   Lissa Cee MD

## 2021-01-27 ENCOUNTER — HOSPITAL ENCOUNTER (EMERGENCY)
Age: 66
Discharge: HOME | End: 2021-01-27
Attending: STUDENT IN AN ORGANIZED HEALTH CARE EDUCATION/TRAINING PROGRAM
Payer: MEDICARE

## 2021-01-27 VITALS
TEMPERATURE: 99.6 F | HEART RATE: 71 BPM | WEIGHT: 315 LBS | OXYGEN SATURATION: 100 % | RESPIRATION RATE: 16 BRPM | SYSTOLIC BLOOD PRESSURE: 116 MMHG | BODY MASS INDEX: 44.1 KG/M2 | DIASTOLIC BLOOD PRESSURE: 63 MMHG | HEIGHT: 71 IN

## 2021-01-27 DIAGNOSIS — S50.812A FOREARM ABRASION, LEFT, INITIAL ENCOUNTER: ICD-10-CM

## 2021-01-27 DIAGNOSIS — T82.838A HEMORRHAGE OF ARTERIOVENOUS FISTULA, INITIAL ENCOUNTER (HCC): Primary | ICD-10-CM

## 2021-01-27 DIAGNOSIS — N18.5 CHRONIC RENAL FAILURE SYNDROME, STAGE 5 (HCC): ICD-10-CM

## 2021-01-27 LAB
ABO/RH: NORMAL
ANION GAP SERPL CALCULATED.3IONS-SCNC: 16 MEQ/L (ref 9–15)
ANTIBODY SCREEN: NORMAL
APTT: 30.3 SEC (ref 24.4–36.8)
BASOPHILS ABSOLUTE: 0.1 K/UL (ref 0–0.2)
BASOPHILS RELATIVE PERCENT: 0.8 %
BUN BLDV-MCNC: 52 MG/DL (ref 8–23)
CALCIUM SERPL-MCNC: 9.7 MG/DL (ref 8.5–9.9)
CHLORIDE BLD-SCNC: 92 MEQ/L (ref 95–107)
CO2: 30 MEQ/L (ref 20–31)
CREAT SERPL-MCNC: 10.99 MG/DL (ref 0.7–1.2)
EOSINOPHILS ABSOLUTE: 0.2 K/UL (ref 0–0.7)
EOSINOPHILS RELATIVE PERCENT: 2.1 %
GFR AFRICAN AMERICAN: 5.7
GFR NON-AFRICAN AMERICAN: 4.7
GLUCOSE BLD-MCNC: 143 MG/DL (ref 70–99)
HCT VFR BLD CALC: 36.9 % (ref 42–52)
HEMOGLOBIN: 11.8 G/DL (ref 14–18)
INR BLD: 1
LYMPHOCYTES ABSOLUTE: 1.3 K/UL (ref 1–4.8)
LYMPHOCYTES RELATIVE PERCENT: 16.3 %
MCH RBC QN AUTO: 26.2 PG (ref 27–31.3)
MCHC RBC AUTO-ENTMCNC: 32 % (ref 33–37)
MCV RBC AUTO: 82 FL (ref 80–100)
MONOCYTES ABSOLUTE: 1.3 K/UL (ref 0.2–0.8)
MONOCYTES RELATIVE PERCENT: 16.4 %
NEUTROPHILS ABSOLUTE: 5.1 K/UL (ref 1.4–6.5)
NEUTROPHILS RELATIVE PERCENT: 64.4 %
PDW BLD-RTO: 17.1 % (ref 11.5–14.5)
PLATELET # BLD: 223 K/UL (ref 130–400)
POTASSIUM SERPL-SCNC: 5.2 MEQ/L (ref 3.4–4.9)
PROTHROMBIN TIME: 13.7 SEC (ref 12.3–14.9)
RBC # BLD: 4.5 M/UL (ref 4.7–6.1)
SODIUM BLD-SCNC: 138 MEQ/L (ref 135–144)
WBC # BLD: 7.9 K/UL (ref 4.8–10.8)

## 2021-01-27 PROCEDURE — 86850 RBC ANTIBODY SCREEN: CPT

## 2021-01-27 PROCEDURE — 99284 EMERGENCY DEPT VISIT MOD MDM: CPT

## 2021-01-27 PROCEDURE — 80048 BASIC METABOLIC PNL TOTAL CA: CPT

## 2021-01-27 PROCEDURE — 86900 BLOOD TYPING SEROLOGIC ABO: CPT

## 2021-01-27 PROCEDURE — 86901 BLOOD TYPING SEROLOGIC RH(D): CPT

## 2021-01-27 PROCEDURE — 6370000000 HC RX 637 (ALT 250 FOR IP): Performed by: STUDENT IN AN ORGANIZED HEALTH CARE EDUCATION/TRAINING PROGRAM

## 2021-01-27 PROCEDURE — 85730 THROMBOPLASTIN TIME PARTIAL: CPT

## 2021-01-27 PROCEDURE — 85025 COMPLETE CBC W/AUTO DIFF WBC: CPT

## 2021-01-27 PROCEDURE — 36415 COLL VENOUS BLD VENIPUNCTURE: CPT

## 2021-01-27 PROCEDURE — 85610 PROTHROMBIN TIME: CPT

## 2021-01-27 RX ORDER — DIAPER,BRIEF,INFANT-TODD,DISP
EACH MISCELLANEOUS ONCE
Status: COMPLETED | OUTPATIENT
Start: 2021-01-27 | End: 2021-01-27

## 2021-01-27 RX ADMIN — BACITRACIN ZINC 1 G: 500 OINTMENT TOPICAL at 15:35

## 2021-01-27 ASSESSMENT — ENCOUNTER SYMPTOMS
VOMITING: 0
SINUS PRESSURE: 0
CHEST TIGHTNESS: 0
SHORTNESS OF BREATH: 0
BACK PAIN: 0
TROUBLE SWALLOWING: 0
ABDOMINAL PAIN: 0
DIARRHEA: 0
COUGH: 0

## 2021-01-27 NOTE — ED NOTES
Patient resting in bed with no s/s of distress. Patient states he has HD on M/W/F and his next appt is at 5p today.      Brandon Cook RN  01/27/21 [id number]

## 2021-01-27 NOTE — ED PROVIDER NOTES
[address] [facility] ED  eMERGENCY dEPARTMENT eNCOUnter      Pt Name: Joshua Holland  MRN: [id number]  [place] 1955  Date of evaluation: 1/27/2021  Provider: Lianna Goff, [facility]       Chief Complaint   Patient presents with    Vascular Access Problem     Fistula on left forearm started bleeding after scratched an itchy spot on it. Lost alot of blood out of it around 1330         HISTORY OF PRESENT ILLNESS   (Location/Symptom, Timing/Onset,Context/Setting, Quality, Duration, Modifying Factors, Severity)  Note limiting factors. Joshua Holland is a 76 y.o. male who presents to the emergency department with c/o radiation to the AV fistula that happened approximately 1:30 PM.  Patient states blood was squirting out and got all over his desk and computer. Patient came to the ER a wrapping was placed by the nurse and after approximately 10 minutes bleeding had ceased. The ED attending took down the dressing there is an area of abrasion but no active bleeding. Patient states blood had literally soaked through his shirt and it looked like a large amount of blood. Patient denies passing out or dizziness. Patient denies any fever, chills, cough, nausea, vomiting or diarrhea. States he simply scratched the area because it is mildly itchy and the skin just started to bleed bright red blood that was pulsatile. The history is provided by the patient. NursingNotes were reviewed. REVIEW OF SYSTEMS    (2-9 systems for level 4, 10 or more for level 5)     Review of Systems   Constitutional: Negative for activity change, appetite change, chills, fever and unexpected weight change. HENT: Negative for drooling, ear pain, nosebleeds, sinus pressure and trouble swallowing. Respiratory: Negative for cough, chest tightness and shortness of breath. Cardiovascular: Negative for chest pain and leg swelling. Gastrointestinal: Negative for abdominal pain, diarrhea and vomiting. Endocrine: Negative for polydipsia and polyphagia. Genitourinary: Negative for dysuria, flank pain and frequency. Musculoskeletal: Negative for back pain and myalgias. Skin: Positive for wound. Negative for pallor and rash. Neurological: Negative for syncope, weakness and headaches. Hematological: Does not bruise/bleed easily. All other systems reviewed and are negative. Except as noted above the remainder of the review of systems was reviewed and negative.        PAST MEDICAL HISTORY     Past Medical History:   Diagnosis Date    Hemodialysis patient ([address] 05/2008    MWF    Hyperlipidemia     Hypertension     Renal failure     Sleep apnea          SURGICALHISTORY       Past Surgical History:   Procedure Laterality Date    COLONOSCOPY      Polyps 2010, none in 2015    COLONOSCOPY N/A 2/6/2020    COLORECTAL CANCER SCREENING, HIGH RISK performed by Jena Aguayo MD at [address] Left 2008   [facility]       Discharge Medication List as of 1/27/2021  3:36 PM      CONTINUE these medications which have NOT CHANGED    Details   gabapentin (NEURONTIN) 300 MG capsule TK ONE C PO HS AFTER DIALYSIS 5 DAYS A WEEK, Disp-60 capsule, R-3Normal      VASCEPA 1 g CAPS capsule TK 2 CS PO BID, DAWHistorical Med      torsemide (DEMADEX) 100 MG tablet Take 100 mg by mouthHistorical Med      amLODIPine (NORVASC) 5 MG tablet Take 5 mg by mouthHistorical Med      aliskiren (TEKTURNA) 300 MG tablet Take 300 mg by mouthHistorical Med      rosuvastatin (CRESTOR) 10 MG tablet Take 10 mg by mouthHistorical Med      sevelamer hcl (RENAGEL) 800 MG tablet Take 4,000 mg by mouthHistorical Med      cinacalcet (SENSIPAR) 30 MG tablet TAKE 1 TABLET BY MOUTH ONCE A DAY AS DIRECTED WITH THE EVENING MEALHistorical Med      aspirin (ECOTRIN LOW STRENGTH) 81 MG EC tablet Take by mouthHistorical Med ALLERGIES     Patient has no known allergies. FAMILY HISTORY     History reviewed. No pertinent family history. SOCIAL HISTORY       Social History     Socioeconomic History    Marital status:      Spouse name: None    Number of children: None    Years of education: None    Highest education level: None   Occupational History    None   Social Needs    Financial resource strain: Not hard at all   [place] insecurity     Worry: Never true     Inability: Never true    Transportation needs     Medical: No     Non-medical: No   Tobacco Use    Smoking status: Never Smoker    Smokeless tobacco: Never Used   Substance and Sexual Activity    Alcohol use: Not Currently    Drug use: Not Currently    Sexual activity: None   Lifestyle    Physical activity     Days per week: None     Minutes per session: None    Stress: None   Relationships    Social connections     Talks on phone: None     Gets together: None     Attends Baptist service: None     Active member of club or organization: None     Attends meetings of clubs or organizations: None     Relationship status: None    Intimate partner violence     Fear of current or ex partner: None     Emotionally abused: None     Physically abused: None     Forced sexual activity: None   Other Topics Concern    None   Social History Narrative    None       SCREENINGS      @FLOW([id number])@      PHYSICAL EXAM    (up to 7 for level 4, 8 or more for level 5)     ED Triage Vitals [01/27/21 1430]   BP Temp Temp Source Pulse Resp SpO2 Height Weight   135/62 99.6 °F (37.6 °C) Oral 68 16 97 % 5' 11\" (1.803 m) (!) 315 lb (142.9 kg)       Physical Exam  Vitals signs and nursing note reviewed. Constitutional:       General: He is awake. He is not in acute distress. Appearance: Normal appearance. He is well-developed and normal weight. He is not ill-appearing, toxic-appearing or diaphoretic. Comments: No photophobia. No phonophobia.    HENT: Head: Normocephalic and atraumatic. No Cervantes's sign. Right Ear: External ear normal.      Left Ear: External ear normal.      Nose: Nose normal. No congestion or rhinorrhea. Mouth/Throat:      Mouth: Mucous membranes are moist.      Pharynx: Oropharynx is clear. No oropharyngeal exudate or posterior oropharyngeal erythema. Eyes:      General: No scleral icterus. Right eye: No foreign body or discharge. Left eye: No discharge. Extraocular Movements: Extraocular movements intact. Conjunctiva/sclera: Conjunctivae normal.      Left eye: No exudate. Pupils: Pupils are equal, round, and reactive to light. Neck:      Musculoskeletal: Normal range of motion and neck supple. No neck rigidity. Vascular: No JVD. Trachea: No tracheal deviation. Comments: No meningismus. Cardiovascular:      Rate and Rhythm: Normal rate and regular rhythm. Heart sounds: Normal heart sounds. Heart sounds not distant. No murmur. No friction rub. No gallop. Arteriovenous access: left arteriovenous access is present. Comments: AV fistula left forearm with superficial abrasion. Good palpable thrill. No active bleeding. Pulmonary:      Effort: Pulmonary effort is normal. No respiratory distress. Breath sounds: Normal breath sounds. No stridor. No wheezing, rhonchi or rales. Chest:      Chest wall: No tenderness. Abdominal:      General: Abdomen is flat. Bowel sounds are normal. There is no distension. Palpations: Abdomen is soft. There is no mass. Tenderness: There is no abdominal tenderness. There is no right CVA tenderness, left CVA tenderness, guarding or rebound. Hernia: No hernia is present. Musculoskeletal: Normal range of motion. General: No swelling, tenderness, deformity or signs of injury. Lymphadenopathy:      Head:      Right side of head: No submental adenopathy. Left side of head: No submental adenopathy.    Skin: General: Skin is warm and dry. Capillary Refill: Capillary refill takes less than 2 seconds. Coloration: Skin is not jaundiced or pale. Findings: No bruising, erythema, lesion or rash. Neurological:      General: No focal deficit present. Mental Status: He is alert and oriented to person, place, and time. Mental status is at baseline. Cranial Nerves: No cranial nerve deficit. Sensory: No sensory deficit. Motor: No weakness. Coordination: Coordination normal.      Deep Tendon Reflexes: Reflexes are normal and symmetric. Psychiatric:         Mood and Affect: Mood normal.         Behavior: Behavior normal. Behavior is cooperative. Thought Content:  Thought content normal.         Judgment: Judgment normal.         DIAGNOSTIC RESULTS     EKG: All EKG's are interpreted by the Emergency Department Physician who either signs or Co-signsthis chart in the absence of a cardiologist.        RADIOLOGY:   Meredith Gins such as CT, Ultrasound and MRI are read by the radiologist. Plain radiographic images are visualized and preliminarily interpreted by the emergency physician with the below findings:        Interpretation per the Radiologist below, if available at the time ofthis note:    No orders to display         ED BEDSIDE ULTRASOUND:   Performed by ED Physician - none    LABS:  Labs Reviewed   BASIC METABOLIC PANEL - Abnormal; Notable for the following components:       Result Value    Potassium 5.2 (*)     Chloride 92 (*)     Anion Gap 16 (*)     Glucose 143 (*)     BUN 52 (*)     CREATININE 10.99 (*)     GFR Non- 4.7 (*)     GFR  5.7 (*)     All other components within normal limits    Narrative:     Macario BRODY tel. [id number],  CREA results called to and read back by Dr. Morgan Dolan, 01/27/2021 15:23, by Himanshu Abraham   CBC WITH AUTO DIFFERENTIAL - Abnormal; Notable for the following components:    RBC 4.50 (*)

## 2021-01-27 NOTE — ED NOTES
No IV needed per Dr Lawerance Opitz. Labs drawn x 1 attempt. Dr Lawerance Opitz at bedside cleaning fistula area. No active bleeding noted at this time.      Yamel Cantu RN  01/27/21 8247

## 2021-01-27 NOTE — ED TRIAGE NOTES
A & Ox4. Skin pink warm and dry. Pt states he was at work, itched his fistula and it started squirting blood. Pts shirts x 2 soaked in blood in abdominal area. Per [facility], bleeding was under control when they got there. Dressing intact. No active bleeding noted at this time.

## 2021-01-27 NOTE — ED NOTES
Dr Myles Jeo at bedside with explanation of results and possible discharge. Discharge education reviewed. Patient instructed to follow up with PCP and come back to the ED with any new or worsening symptoms. No questions or concerns at this time.          Marya Jennings RN  01/27/21 1988

## 2021-01-28 ENCOUNTER — TELEPHONE (OUTPATIENT)
Dept: FAMILY MEDICINE CLINIC | Age: 66
End: 2021-01-28

## 2021-01-28 NOTE — TELEPHONE ENCOUNTER
Informed patient of your response. He is asking if there is medication that would decrease inflammation?     He uses [facility] mail away

## 2021-01-29 RX ORDER — CELECOXIB 200 MG/1
200 CAPSULE ORAL DAILY
Qty: 90 CAPSULE | Refills: 3 | Status: SHIPPED | OUTPATIENT
Start: 2021-01-29 | End: 2021-10-13 | Stop reason: SDUPTHER

## 2021-01-29 NOTE — TELEPHONE ENCOUNTER
Called and spoke to wife letting her know that Celebrex was called into the [facility] mail [facility] pharmacy

## 2021-04-27 ENCOUNTER — HOSPITAL ENCOUNTER (OUTPATIENT)
Dept: NON INVASIVE DIAGNOSTICS | Age: 66
Discharge: HOME | End: 2021-04-27
Payer: MEDICARE

## 2021-04-27 DIAGNOSIS — N18.6 END STAGE RENAL DISEASE (HCC): ICD-10-CM

## 2021-04-27 DIAGNOSIS — R01.2 PERICARDIAL FRICTION RUB: ICD-10-CM

## 2021-04-27 DIAGNOSIS — I95.1 HYPOTENSION, POSTURAL: ICD-10-CM

## 2021-04-27 LAB
LV EF: 60 %
LVEF MODALITY: NORMAL

## 2021-04-27 PROCEDURE — 93306 TTE W/DOPPLER COMPLETE: CPT

## 2021-06-10 ENCOUNTER — HOSPITAL ENCOUNTER (EMERGENCY)
Age: 66
Discharge: HOME | End: 2021-06-10
Payer: MEDICARE

## 2021-06-10 VITALS
DIASTOLIC BLOOD PRESSURE: 56 MMHG | RESPIRATION RATE: 20 BRPM | HEART RATE: 67 BPM | TEMPERATURE: 98.3 F | OXYGEN SATURATION: 98 % | SYSTOLIC BLOOD PRESSURE: 128 MMHG

## 2021-06-10 DIAGNOSIS — T82.838D HEMORRHAGE OF ARTERIOVENOUS FISTULA, SUBSEQUENT ENCOUNTER: Primary | ICD-10-CM

## 2021-06-10 PROCEDURE — 99284 EMERGENCY DEPT VISIT MOD MDM: CPT

## 2021-06-10 PROCEDURE — 2500000003 HC RX 250 WO HCPCS: Performed by: NURSE PRACTITIONER

## 2021-06-10 RX ADMIN — Medication: at 13:57

## 2021-06-10 ASSESSMENT — ENCOUNTER SYMPTOMS
VOMITING: 0
EYE REDNESS: 0
NAUSEA: 0
RHINORRHEA: 0
SHORTNESS OF BREATH: 0
BLOOD IN STOOL: 0
WHEEZING: 0
DIARRHEA: 0
CONSTIPATION: 0
TROUBLE SWALLOWING: 0
COUGH: 0
SORE THROAT: 0
EYE DISCHARGE: 0
EYE PAIN: 0
COLOR CHANGE: 0
ABDOMINAL PAIN: 0
BACK PAIN: 0

## 2021-06-10 NOTE — ED NOTES
Bed: 03  Expected date:   Expected time:   Means of arrival:   Comments:  76year old male. Bleeding from dialysis site.  132/76, 18, 94% ra 18 in r alena     April RAFA Vyas RN  06/10/21 5545

## 2021-06-10 NOTE — ED PROVIDER NOTES
[address] [facility] ED  EMERGENCY DEPARTMENT ENCOUNTER      Pt Name: Aliza Mariscal  MRN: [id number]  [place] 1955  Date of evaluation: 6/10/2021  Provider: YOGESH Burger CNP    CHIEF COMPLAINT       Chief Complaint   Patient presents with    Other     bleeding from fistula         HISTORY OF PRESENT ILLNESS   (Location/Symptom, Timing/Onset,Context/Setting, Quality, Duration, Modifying Factors, Severity)  Note limiting factors. Aliza Mariscal is a 76 y.o. male who presents to the emergency department with a chart reviewed pmhx of HTN and inflammatory polyps for a chief complaint of left arm fistula bleeding. Patient reports bleeding started today around 9 am. Patient was at his dialysis session when it spontaneously started to bleed. Patient reports history of this occurring and has an appointment for a fistula angiogram tomorrow with Dr. Camille Parekh. Patient has celox and wrap on the distula at this time covered up by paramedics. Nursing Notes were reviewed. REVIEW OF SYSTEMS    (2-9 systems for level 4, 10 or more for level 5)     Review of Systems   Constitutional: Negative for activity change, appetite change, fatigue and fever. HENT: Negative for congestion, ear pain, rhinorrhea, sore throat and trouble swallowing. Eyes: Negative for pain, discharge and redness. Respiratory: Negative for cough, shortness of breath and wheezing. Cardiovascular: Negative for chest pain and palpitations. Gastrointestinal: Negative for abdominal pain, blood in stool, constipation, diarrhea, nausea and vomiting. Endocrine: Negative for polydipsia and polyuria. Genitourinary: Negative for decreased urine volume, dysuria, flank pain and hematuria. Musculoskeletal: Negative for arthralgias, back pain and myalgias. Skin: Positive for wound. Negative for color change and rash. Neurological: Negative for dizziness, syncope, weakness, light-headedness and headaches. Gatherings with Friends and Family:     Attends Oriental orthodox Services:     Active Member of Clubs or Organizations:     Attends Club or Organization Meetings:     Marital Status:    Intimate Partner Violence:     Fear of Current or Ex-Partner:     Emotionally Abused:     Physically Abused:     Sexually Abused:        SCREENINGS             PHYSICAL EXAM    (up to 7 for level 4, 8 or more for level 5)     ED Triage Vitals [06/10/21 1206]   BP Temp Temp Source Pulse Resp SpO2 Height Weight   122/67 98.3 °F (36.8 °C) Oral 65 20 96 % -- --       Physical Exam  Vitals and nursing note reviewed. Constitutional:       General: He is not in acute distress. Appearance: He is well-developed. He is not diaphoretic. HENT:      Head: Normocephalic and atraumatic. Nose: Nose normal.   Eyes:      Conjunctiva/sclera: Conjunctivae normal.      Pupils: Pupils are equal, round, and reactive to light. Cardiovascular:      Rate and Rhythm: Normal rate and regular rhythm. Heart sounds: Normal heart sounds. Pulmonary:      Effort: Pulmonary effort is normal. No respiratory distress. Breath sounds: Normal breath sounds. No wheezing. Abdominal:      General: Bowel sounds are normal.      Palpations: Abdomen is soft. Tenderness: There is no abdominal tenderness. Musculoskeletal:      Cervical back: Normal range of motion and neck supple. Skin:     General: Skin is warm and dry. Capillary Refill: Capillary refill takes less than 2 seconds. Findings: No rash. Neurological:      Mental Status: He is alert and oriented to person, place, and time. Cranial Nerves: No cranial nerve deficit.    Psychiatric:         Behavior: Behavior normal.         RESULTS     EKG: All EKG's are interpreted by the Emergency Department Physician who either signs or Co-signsthis chart in the absence of a cardiologist.        RADIOLOGY:   Non-plain filmimages such as CT, Ultrasound and MRI are read by the radiologist. Katrin Dilling radiographic images are visualized and preliminarily interpreted by the emergency physician with the below findings:        Interpretation per the Radiologist below, if available at the time ofthis note:    No orders to display         ED BEDSIDE ULTRASOUND:   Performed by ED Physician - none    LABS:  Labs Reviewed - No data to display    All other labs were within normal range or not returned as of this dictation. EMERGENCY DEPARTMENT COURSE and DIFFERENTIAL DIAGNOSIS/MDM:   Vitals:    Vitals:    06/10/21 1215 06/10/21 1230 06/10/21 1300 06/10/21 1330   BP:  120/62 120/62 (!) 128/56   Pulse:    67   Resp:       Temp:       TempSrc:       SpO2: 97%   98%            MDM   Patient is a 60-year-old male presenting to the emergency department chief complaint of left fistula bleeding. Patient is hemodynamically stable and he is nontoxic-appearing. Is currently wrapped with gauze placed on CELOX powder and will continue to apply pressure and reassess. 3 figure of 8 stiches place in open bleeding fistula as it continued to bleed after pressure and celox. More celox applied after and patient will keep appointment for angiogram valentina. Patient stable and dc in a stable condition with stable vital signs. CRITICAL CARE TIME       CONSULTS:  None    PROCEDURES:  Unless otherwise noted below, none     Procedures    FINAL IMPRESSION      1.  Hemorrhage of arteriovenous fistula, initial encounter          DISPOSITION/PLAN   DISPOSITION Decision To Discharge 06/10/2021 02:12:28 PM      PATIENT REFERRED TO:  Kendra Kothari MD  [address] [facility] [address] [facility]  [phone]    Schedule an appointment as soon as possible for a visit in 1 day        DISCHARGE MEDICATIONS:  New Prescriptions    No medications on file          (Please notethat portions of this note were completed with a voice recognition program.  Efforts were made to edit the dictations but occasionally words are

## 2021-06-10 NOTE — ED NOTES
Patient to ER for fistula bleeding before dialysis. Kinsey Zhang NP at bedside. Patient reports bandage and Celox applied 15 min PTA. Per Rosemary to reassess in 30 minutes. Lum Anchors Devi Elias RN  06/10/21 Simi 3  Devi Elias RN  06/10/21 8850

## 2021-06-10 NOTE — ED NOTES
Dr. Grey Mercado NP, and this RN at bedside to complete stitches. Bleeding now controlled. Elena Johansen RN  06/10/21 3425

## 2021-06-10 NOTE — ED NOTES
Discharge instructions reviewed with patient. Verbalized understanding. Discharged with bleeding controlled. Celox on arm with gauze and pressure dressing. Ambulated off unit with a steady gait. Rashad Shin RN  06/10/21 0153

## 2021-07-04 ENCOUNTER — HOSPITAL ENCOUNTER (EMERGENCY)
Age: 66
Discharge: HOME | End: 2021-07-04
Attending: EMERGENCY MEDICINE
Payer: MEDICARE

## 2021-07-04 VITALS
TEMPERATURE: 97.9 F | BODY MASS INDEX: 43.24 KG/M2 | WEIGHT: 310 LBS | SYSTOLIC BLOOD PRESSURE: 128 MMHG | HEART RATE: 99 BPM | DIASTOLIC BLOOD PRESSURE: 62 MMHG | RESPIRATION RATE: 18 BRPM | OXYGEN SATURATION: 96 %

## 2021-07-04 DIAGNOSIS — T82.838A HEMORRHAGE OF ARTERIOVENOUS FISTULA, INITIAL ENCOUNTER (HCC): Primary | ICD-10-CM

## 2021-07-04 LAB
ALBUMIN SERPL-MCNC: 4.2 G/DL (ref 3.5–4.6)
ALP BLD-CCNC: 89 U/L (ref 35–104)
ALT SERPL-CCNC: 9 U/L (ref 0–41)
ANION GAP SERPL CALCULATED.3IONS-SCNC: 17 MEQ/L (ref 9–15)
AST SERPL-CCNC: 11 U/L (ref 0–40)
BASOPHILS ABSOLUTE: 0.1 K/UL (ref 0–0.2)
BASOPHILS RELATIVE PERCENT: 1.4 %
BILIRUB SERPL-MCNC: 0.4 MG/DL (ref 0.2–0.7)
BUN BLDV-MCNC: 43 MG/DL (ref 8–23)
CALCIUM SERPL-MCNC: 9.7 MG/DL (ref 8.5–9.9)
CHLORIDE BLD-SCNC: 91 MEQ/L (ref 95–107)
CO2: 28 MEQ/L (ref 20–31)
CREAT SERPL-MCNC: 9.24 MG/DL (ref 0.7–1.2)
EOSINOPHILS ABSOLUTE: 0.2 K/UL (ref 0–0.7)
EOSINOPHILS RELATIVE PERCENT: 2.7 %
GFR AFRICAN AMERICAN: 6.9
GFR NON-AFRICAN AMERICAN: 5.7
GLOBULIN: 2.6 G/DL (ref 2.3–3.5)
GLUCOSE BLD-MCNC: 314 MG/DL (ref 70–99)
HCT VFR BLD CALC: 39.5 % (ref 42–52)
HEMOGLOBIN: 11.9 G/DL (ref 14–18)
LYMPHOCYTES ABSOLUTE: 1 K/UL (ref 1–4.8)
LYMPHOCYTES RELATIVE PERCENT: 15.7 %
MCH RBC QN AUTO: 22.9 PG (ref 27–31.3)
MCHC RBC AUTO-ENTMCNC: 30.2 % (ref 33–37)
MCV RBC AUTO: 75.8 FL (ref 80–100)
MONOCYTES ABSOLUTE: 0.7 K/UL (ref 0.2–0.8)
MONOCYTES RELATIVE PERCENT: 11.4 %
NEUTROPHILS ABSOLUTE: 4.5 K/UL (ref 1.4–6.5)
NEUTROPHILS RELATIVE PERCENT: 68.8 %
PDW BLD-RTO: 18.2 % (ref 11.5–14.5)
PLATELET # BLD: 200 K/UL (ref 130–400)
POTASSIUM SERPL-SCNC: 4.5 MEQ/L (ref 3.4–4.9)
RBC # BLD: 5.21 M/UL (ref 4.7–6.1)
SODIUM BLD-SCNC: 136 MEQ/L (ref 135–144)
TOTAL PROTEIN: 6.8 G/DL (ref 6.3–8)
WBC # BLD: 6.5 K/UL (ref 4.8–10.8)

## 2021-07-04 PROCEDURE — 12001 RPR S/N/AX/GEN/TRNK 2.5CM/<: CPT

## 2021-07-04 PROCEDURE — 36415 COLL VENOUS BLD VENIPUNCTURE: CPT

## 2021-07-04 PROCEDURE — 80053 COMPREHEN METABOLIC PANEL: CPT

## 2021-07-04 PROCEDURE — 99283 EMERGENCY DEPT VISIT LOW MDM: CPT

## 2021-07-04 PROCEDURE — 85025 COMPLETE CBC W/AUTO DIFF WBC: CPT

## 2021-07-04 ASSESSMENT — ENCOUNTER SYMPTOMS
COUGH: 0
ABDOMINAL PAIN: 0
VOMITING: 0
SORE THROAT: 0
NAUSEA: 0
SHORTNESS OF BREATH: 0
DIARRHEA: 0
BACK PAIN: 0

## 2021-07-04 NOTE — ED TRIAGE NOTES
Patient bleeding from dialysis fistula on left arm. Patient states that he is due to see a vascular surgeon on Thursday.

## 2021-07-04 NOTE — ED PROVIDER NOTES
(ECOTRIN LOW STRENGTH) 81 MG EC TABLET    Take by mouth    CELECOXIB (CELEBREX) 200 MG CAPSULE    Take 1 capsule by mouth daily    CINACALCET (SENSIPAR) 30 MG TABLET    TAKE 1 TABLET BY MOUTH ONCE A DAY AS DIRECTED WITH THE EVENING MEAL    GABAPENTIN (NEURONTIN) 300 MG CAPSULE    TK ONE C PO HS AFTER DIALYSIS 5 DAYS A WEEK    ROSUVASTATIN (CRESTOR) 10 MG TABLET    Take 10 mg by mouth    SEVELAMER HCL (RENAGEL) 800 MG TABLET    Take 4,000 mg by mouth    TORSEMIDE (DEMADEX) 100 MG TABLET    Take 100 mg by mouth    VASCEPA 1 G CAPS CAPSULE    TK 2 CS PO BID       ALLERGIES     Patient has no known allergies. FAMILY HISTORY     History reviewed. No pertinent family history. SOCIAL HISTORY       Social History     Socioeconomic History    Marital status:      Spouse name: None    Number of children: None    Years of education: None    Highest education level: None   Occupational History    None   Tobacco Use    Smoking status: Never Smoker    Smokeless tobacco: Never Used   Vaping Use    Vaping Use: Never used   Substance and Sexual Activity    Alcohol use: Not Currently    Drug use: Not Currently    Sexual activity: None   Other Topics Concern    None   Social History Narrative    None     Social Determinants of Health     Financial Resource Strain: Low Risk     Difficulty of Paying Living Expenses: Not hard at all   Food Insecurity: No Food Insecurity    Worried About Running Out of Food in the Last Year: Never true    [place] of Food in the Last Year: Never true   Transportation Needs: No Transportation Needs    Lack of Transportation (Medical): No    Lack of Transportation (Non-Medical):  No   Physical Activity:     Days of Exercise per Week:     Minutes of Exercise per Session:    Stress:     Feeling of Stress :    Social Connections:     Frequency of Communication with Friends and Family:     Frequency of Social Gatherings with Friends and Family:     Attends Confucianist Services:     Active Member of Clubs or Organizations:     Attends Club or Organization Meetings:     Marital Status:    Intimate Partner Violence:     Fear of Current or Ex-Partner:     Emotionally Abused:     Physically Abused:     Sexually Abused:          PHYSICAL EXAM       ED Triage Vitals [07/04/21 1317]   BP Temp Temp Source Pulse Resp SpO2 Height Weight   128/62 97.9 °F (36.6 °C) Oral 99 18 96 % -- (!) 310 lb (140.6 kg)       Physical Exam  Vitals and nursing note reviewed. Constitutional:       Appearance: He is well-developed. HENT:      Head: Normocephalic. Right Ear: External ear normal.      Left Ear: External ear normal.   Eyes:      Conjunctiva/sclera: Conjunctivae normal.      Pupils: Pupils are equal, round, and reactive to light. Cardiovascular:      Rate and Rhythm: Normal rate and regular rhythm. Heart sounds: Normal heart sounds. Pulmonary:      Effort: Pulmonary effort is normal.      Breath sounds: Normal breath sounds. Abdominal:      General: Bowel sounds are normal. There is no distension. Palpations: Abdomen is soft. Tenderness: There is no abdominal tenderness. Musculoskeletal:         General: Normal range of motion. Cervical back: Normal range of motion and neck supple. Comments: 2 cm area of L fistula bleeding. +Thrill. Skin:     General: Skin is warm and dry. Neurological:      Mental Status: He is alert and oriented to person, place, and time. Psychiatric:         Mood and Affect: Mood normal.           MDM  77 yo male presents to the ED with L fistula bleeding. Pt is afebrile, hemodynamically stable. Pt's fistula was sutured in the ED with resolution of bleed. Labs remarkable for Hb 11.9, glucose 314, BUN 43, Cr 9.24. Pt reassessed and doing well. Pt has f/u with vascular in 3 days. Pt recently had a fistulagram.  Pt given bleeding warning signs and will f/u with vascular. Pt understands plan.       Laceration Repair Procedure Note    Indication: Fistula bleeding    Procedure: The patient was placed in the appropriate position and anesthesia around the laceration was not performed at the patient's request. The area was then cleansed with betadine and draped in a sterile fashion. The laceration was closed with 3-0 Ethilon using figure of 8. There were no additional lacerations requiring repair. The wound area was then dressed with a sterile dressing. Total repaired wound length: 2 cm. Other Items: Suture count: 3    The patient tolerated the procedure well. Complications: None              FINAL IMPRESSION      1.  Hemorrhage of arteriovenous fistula, initial encounter [facility])          DISPOSITION/PLAN   DISPOSITION Decision To Discharge 07/04/2021 02:13:51 PM        DISCHARGE MEDICATIONS:  [unfilled]         Winston Rutledge MD(electronically signed)  Attending Emergency Physician           Winston Rutledge MD  07/04/21 368 MAURA Zambrano MD  07/04/21 7854

## 2021-07-04 NOTE — ED NOTES
Bleeding stopped after sutures inserted by Dr Yuki Coats. Guaze applied with ace wrap for support. Discharge instructions reviewed with patient. Verbalized understanding. Vinnie Banuelos RN  07/04/21 4591

## 2021-07-15 ENCOUNTER — OFFICE VISIT (OUTPATIENT)
Dept: FAMILY MEDICINE CLINIC | Age: 66
End: 2021-07-15
Payer: MEDICARE

## 2021-07-15 VITALS
HEIGHT: 71 IN | DIASTOLIC BLOOD PRESSURE: 68 MMHG | OXYGEN SATURATION: 95 % | HEART RATE: 96 BPM | WEIGHT: 315 LBS | BODY MASS INDEX: 44.1 KG/M2 | TEMPERATURE: 99.1 F | SYSTOLIC BLOOD PRESSURE: 134 MMHG

## 2021-07-15 DIAGNOSIS — Z99.2 DIALYSIS PATIENT (HCC): ICD-10-CM

## 2021-07-15 DIAGNOSIS — M48.062 SPINAL STENOSIS OF LUMBAR REGION WITH NEUROGENIC CLAUDICATION: ICD-10-CM

## 2021-07-15 DIAGNOSIS — R25.1 TREMORS OF NERVOUS SYSTEM: ICD-10-CM

## 2021-07-15 DIAGNOSIS — I10 ESSENTIAL HYPERTENSION: Primary | ICD-10-CM

## 2021-07-15 PROCEDURE — 1036F TOBACCO NON-USER: CPT | Performed by: FAMILY MEDICINE

## 2021-07-15 PROCEDURE — G8417 CALC BMI ABV UP PARAM F/U: HCPCS | Performed by: FAMILY MEDICINE

## 2021-07-15 PROCEDURE — 1123F ACP DISCUSS/DSCN MKR DOCD: CPT | Performed by: FAMILY MEDICINE

## 2021-07-15 PROCEDURE — 3017F COLORECTAL CA SCREEN DOC REV: CPT | Performed by: FAMILY MEDICINE

## 2021-07-15 PROCEDURE — G8427 DOCREV CUR MEDS BY ELIG CLIN: HCPCS | Performed by: FAMILY MEDICINE

## 2021-07-15 PROCEDURE — 4040F PNEUMOC VAC/ADMIN/RCVD: CPT | Performed by: FAMILY MEDICINE

## 2021-07-15 PROCEDURE — 99214 OFFICE O/P EST MOD 30 MIN: CPT | Performed by: FAMILY MEDICINE

## 2021-07-15 RX ORDER — LANTHANUM CARBONATE 1000 MG/1
1 TABLET, CHEWABLE ORAL 3 TIMES DAILY
COMMUNITY
Start: 2021-01-21 | End: 2022-02-14 | Stop reason: ALTCHOICE

## 2021-07-15 ASSESSMENT — ENCOUNTER SYMPTOMS
RESPIRATORY NEGATIVE: 1
SHORTNESS OF BREATH: 0
GASTROINTESTINAL NEGATIVE: 1
EYES NEGATIVE: 1
ALLERGIC/IMMUNOLOGIC NEGATIVE: 1

## 2021-07-15 NOTE — PROGRESS NOTES
Patient is seen in follow up for   Chief Complaint   Patient presents with    Hypertension     6 mo checkup - checking his BP at home occasionally. No abnormal readings. Hypertension  This is a chronic problem. The current episode started more than 1 year ago. The problem is unchanged. The problem is controlled. Pertinent negatives include no chest pain, palpitations or shortness of breath. There are no associated agents to hypertension. Risk factors for coronary artery disease include male gender. Past treatments include calcium channel blockers and diuretics. The current treatment provides moderate improvement. There are no compliance problems. Past Medical History:   Diagnosis Date    Hemodialysis patient ([facility] 05/2008    MWF    Hyperlipidemia     Hypertension     Renal failure     Sleep apnea      Patient Active Problem List    Diagnosis Date Noted    Inflammatory polyps of colon with other complication ([facility] 33/48/2995    Morbidly obese ([facility] 06/02/2020    Adenomatous polyp of descending colon     JENNIFER on CPAP 01/20/2020    Polycystic kidney disease 01/20/2020    Essential hypertension 01/20/2020    CRI (chronic renal insufficiency), stage 5 ([facility] 01/20/2020    Dialysis patient ([facility] 01/20/2020     Past Surgical History:   Procedure Laterality Date    COLONOSCOPY      Polyps 2010, none in 2015    COLONOSCOPY N/A 2/6/2020    COLORECTAL CANCER SCREENING, HIGH RISK performed by Criss Hunt MD at [address] Left 2008    TONSILLECTOMY AND ADENOIDECTOMY      UVULECTOMY       No family history on file.   Social History     Socioeconomic History    Marital status:      Spouse name: None    Number of children: None    Years of education: None    Highest education level: None   Occupational History    None   Tobacco Use    Smoking status: Never Smoker    Smokeless tobacco: Never Used   Vaping Use    Vaping Use: Never used   Substance and Sexual Activity    Alcohol use: Not Currently    Drug use: Not Currently    Sexual activity: None   Other Topics Concern    None   Social History Narrative    None     Social Determinants of Health     Financial Resource Strain: Low Risk     Difficulty of Paying Living Expenses: Not hard at all   Food Insecurity: No Food Insecurity    Worried About Running Out of Food in the Last Year: Never true    Shelby of Food in the Last Year: Never true   Transportation Needs: No Transportation Needs    Lack of Transportation (Medical): No    Lack of Transportation (Non-Medical): No   Physical Activity:     Days of Exercise per Week:     Minutes of Exercise per Session:    Stress:     Feeling of Stress :    Social Connections:     Frequency of Communication with Friends and Family:     Frequency of Social Gatherings with Friends and Family:     Attends Alevism Services:     Active Member of Clubs or Organizations:     Attends Club or Organization Meetings:     Marital Status:    Intimate Partner Violence:     Fear of Current or Ex-Partner:     Emotionally Abused:     Physically Abused:     Sexually Abused:      Current Outpatient Medications   Medication Sig Dispense Refill    lanthanum (FOSRENOL) 1000 MG chewable tablet Take 1 tablet by mouth 3 times daily      celecoxib (CELEBREX) 200 MG capsule Take 1 capsule by mouth daily 90 capsule 3    VASCEPA 1 g CAPS capsule TK 2 CS PO BID      torsemide (DEMADEX) 100 MG tablet Take 100 mg by mouth      amLODIPine (NORVASC) 5 MG tablet Take 5 mg by mouth      rosuvastatin (CRESTOR) 10 MG tablet Take 10 mg by mouth      cinacalcet (SENSIPAR) 30 MG tablet TAKE 1 TABLET BY MOUTH ONCE A DAY AS DIRECTED WITH THE EVENING MEAL      aspirin (ECOTRIN LOW STRENGTH) 81 MG EC tablet Take by mouth      gabapentin (NEURONTIN) 300 MG capsule TK ONE C PO HS AFTER DIALYSIS 5 DAYS A WEEK 60 capsule 3     No current facility-administered medications for this visit. Current Outpatient Medications on File Prior to Visit   Medication Sig Dispense Refill    lanthanum (FOSRENOL) 1000 MG chewable tablet Take 1 tablet by mouth 3 times daily      celecoxib (CELEBREX) 200 MG capsule Take 1 capsule by mouth daily 90 capsule 3    VASCEPA 1 g CAPS capsule TK 2 CS PO BID      torsemide (DEMADEX) 100 MG tablet Take 100 mg by mouth      amLODIPine (NORVASC) 5 MG tablet Take 5 mg by mouth      rosuvastatin (CRESTOR) 10 MG tablet Take 10 mg by mouth      cinacalcet (SENSIPAR) 30 MG tablet TAKE 1 TABLET BY MOUTH ONCE A DAY AS DIRECTED WITH THE EVENING MEAL      aspirin (ECOTRIN LOW STRENGTH) 81 MG EC tablet Take by mouth      gabapentin (NEURONTIN) 300 MG capsule TK ONE C PO HS AFTER DIALYSIS 5 DAYS A WEEK 60 capsule 3     No current facility-administered medications on file prior to visit. No Known Allergies  Health Maintenance   Topic Date Due    Hepatitis B vaccine (1 of 3 - Risk Recombivax 3-dose series) 08/16/1973    DTaP/Tdap/Td vaccine (1 - Tdap) Never done    Shingles Vaccine (1 of 2) Never done    Lipid screen  02/06/2021    Annual Wellness Visit (AWV)  06/18/2021    Flu vaccine (1) 09/01/2021    Potassium monitoring  07/08/2022    Creatinine monitoring  07/08/2022    Diabetes screen  10/03/2022    Pneumococcal 65+ yrs at Risk Vaccine (2 of 2 - PPSV23) 09/17/2023    Colon cancer screen colonoscopy  02/06/2025    COVID-19 Vaccine  Completed    Hepatitis C screen  Completed    Hepatitis A vaccine  Aged Out    Hib vaccine  Aged Out    Meningococcal (ACWY) vaccine  Aged Out       Review of Systems     Review of Systems   Constitutional: Negative for activity change, appetite change, chills, fever and unexpected weight change. HENT: Negative. Eyes: Negative. Respiratory: Negative. Negative for shortness of breath. Cardiovascular: Negative. Negative for chest pain and palpitations. Gastrointestinal: Negative. Endocrine: Negative. Genitourinary: Negative. Musculoskeletal: Negative. Skin: Negative. Allergic/Immunologic: Negative. Neurological: Negative. Tremors getting worse   Hematological: Negative. Psychiatric/Behavioral: Negative. Physical Exam  Vitals:    07/15/21 0940   BP: 134/68   Site: Left Upper Arm   Position: Sitting   Cuff Size: Medium Adult   Pulse: 96   Temp: 99.1 °F (37.3 °C)   TempSrc: Oral   SpO2: 95%   Weight: (!) 325 lb 9.6 oz (147.7 kg)   Height: 5' 11\" (1.803 m)       Physical Exam  Constitutional:       Appearance: He is well-developed. HENT:      Right Ear: External ear normal.      Left Ear: External ear normal.   Eyes:      Conjunctiva/sclera: Conjunctivae normal.      Pupils: Pupils are equal, round, and reactive to light. Neck:      Thyroid: No thyromegaly. Cardiovascular:      Rate and Rhythm: Normal rate and regular rhythm. Heart sounds: Normal heart sounds. No murmur heard. No friction rub. No gallop. Pulmonary:      Effort: Pulmonary effort is normal. No respiratory distress. Breath sounds: Normal breath sounds. No wheezing. Abdominal:      General: Bowel sounds are normal. There is no distension. Palpations: Abdomen is soft. There is no mass. Tenderness: There is no abdominal tenderness. There is no guarding or rebound. Hernia: No hernia is present. Genitourinary:     Penis: Normal.    Musculoskeletal:         General: No tenderness. Normal range of motion. Cervical back: Normal range of motion and neck supple. Lymphadenopathy:      Cervical: No cervical adenopathy. Skin:     General: Skin is warm and dry. Neurological:      Mental Status: He is alert and oriented to person, place, and time. Cranial Nerves: No cranial nerve deficit. Coordination: Coordination normal.     fine tremor noted    Assessment   Diagnosis Orders   1. Essential hypertension     2.  Dialysis patient [facility])     3. Spinal stenosis of lumbar region with neurogenic claudication       Problem List     Essential hypertension - Primary    Dialysis patient [facility])          Plan  Doing well follow up in 6 months. No orders of the defined types were placed in this encounter. No follow-ups on file.   Halima Cheung MD

## 2021-08-02 DIAGNOSIS — G62.9 NEUROPATHY: ICD-10-CM

## 2021-08-02 RX ORDER — GABAPENTIN 300 MG/1
CAPSULE ORAL
Qty: 60 CAPSULE | Refills: 3 | Status: SHIPPED | OUTPATIENT
Start: 2021-08-02 | End: 2021-08-06 | Stop reason: SDUPTHER

## 2021-08-06 DIAGNOSIS — G62.9 NEUROPATHY: ICD-10-CM

## 2021-08-06 RX ORDER — GABAPENTIN 300 MG/1
CAPSULE ORAL
Qty: 60 CAPSULE | Refills: 3 | Status: SHIPPED | OUTPATIENT
Start: 2021-08-06 | End: 2022-02-14 | Stop reason: SDUPTHER

## 2021-08-06 NOTE — TELEPHONE ENCOUNTER
Patient is calling because [facility] \"reversed\"  the script for gabapentin. He said they told him to call pcp and ask for the script to be resent.       Please approve or deny this request.    Rx requested:  Requested Prescriptions     Pending Prescriptions Disp Refills    gabapentin (NEURONTIN) 300 MG capsule 60 capsule 3     Sig: TK ONE C PO HS AFTER DIALYSIS 5 DAYS A WEEK         Last Office Visit:   7/15/2021      Next Visit Date:  Future Appointments   Date Time Provider Dennys Hyatt   8/27/2021  9:00 AM JOSIAH Mendes   1/17/2022  9:00 AM Nik Shaikh MD [id number]

## 2021-08-24 PROBLEM — R06.09 OTHER FORMS OF DYSPNEA: Status: ACTIVE | Noted: 2019-10-26

## 2021-08-24 PROBLEM — R39.2: Status: ACTIVE | Noted: 2018-06-15

## 2021-08-24 PROBLEM — T78.40XA ALLERGY, UNSPECIFIED, INITIAL ENCOUNTER: Status: ACTIVE | Noted: 2021-06-03

## 2021-08-24 PROBLEM — I49.3 VENTRICULAR PREMATURE BEATS: Status: ACTIVE | Noted: 2021-08-24

## 2021-08-24 PROBLEM — I95.3 HYPOTENSION OF HEMODIALYSIS: Status: ACTIVE | Noted: 2019-08-21

## 2021-08-24 PROBLEM — R53.83 OTHER FATIGUE: Status: ACTIVE | Noted: 2019-08-21

## 2021-08-24 PROBLEM — T78.2XXA ANAPHYLACTIC SHOCK, UNSPECIFIED, INITIAL ENCOUNTER: Status: ACTIVE | Noted: 2021-06-03

## 2021-08-24 PROBLEM — R00.1 SINUS BRADYCARDIA: Status: ACTIVE | Noted: 2021-08-24

## 2021-08-24 PROBLEM — I77.0 ANEURYSM OF ARTERIOVENOUS FISTULA (HCC): Status: ACTIVE | Noted: 2021-08-24

## 2021-08-24 PROBLEM — R60.0 EDEMA OF LOWER EXTREMITY: Status: ACTIVE | Noted: 2021-08-24

## 2021-08-24 PROBLEM — M54.50 CHRONIC MIDLINE LOW BACK PAIN WITHOUT SCIATICA: Status: ACTIVE | Noted: 2019-05-09

## 2021-08-24 PROBLEM — E87.70 FLUID OVERLOAD, UNSPECIFIED: Status: ACTIVE | Noted: 2018-07-09

## 2021-08-24 PROBLEM — R19.7 DIARRHEA, UNSPECIFIED: Status: ACTIVE | Noted: 2021-06-03

## 2021-08-24 PROBLEM — G89.29 CHRONIC MIDLINE LOW BACK PAIN WITHOUT SCIATICA: Status: ACTIVE | Noted: 2019-05-09

## 2021-08-24 PROBLEM — I44.0 FIRST DEGREE ATRIOVENTRICULAR BLOCK: Status: ACTIVE | Noted: 2021-08-24

## 2021-08-24 PROBLEM — R11.0 NAUSEA: Status: ACTIVE | Noted: 2021-06-03

## 2021-08-24 PROBLEM — N40.0 BPH WITHOUT OBSTRUCTION/LOWER URINARY TRACT SYMPTOMS: Status: ACTIVE | Noted: 2019-05-09

## 2021-08-24 PROBLEM — R60.0 LOCALIZED EDEMA: Status: ACTIVE | Noted: 2019-10-26

## 2021-08-24 PROBLEM — R60.9 EDEMA, UNSPECIFIED: Status: ACTIVE | Noted: 2020-01-03

## 2021-08-27 ENCOUNTER — OFFICE VISIT (OUTPATIENT)
Dept: NEUROLOGY | Age: 66
End: 2021-08-27
Payer: MEDICARE

## 2021-08-27 ENCOUNTER — TELEPHONE (OUTPATIENT)
Dept: NEUROLOGY | Age: 66
End: 2021-08-27

## 2021-08-27 VITALS
BODY MASS INDEX: 44.88 KG/M2 | HEART RATE: 90 BPM | WEIGHT: 315 LBS | SYSTOLIC BLOOD PRESSURE: 138 MMHG | DIASTOLIC BLOOD PRESSURE: 80 MMHG

## 2021-08-27 DIAGNOSIS — R79.89 OTHER SPECIFIED ABNORMAL FINDINGS OF BLOOD CHEMISTRY: ICD-10-CM

## 2021-08-27 DIAGNOSIS — R20.2 PARESTHESIA: ICD-10-CM

## 2021-08-27 DIAGNOSIS — Z99.89 OSA ON CPAP: ICD-10-CM

## 2021-08-27 DIAGNOSIS — Q61.3 POLYCYSTIC KIDNEY DISEASE: ICD-10-CM

## 2021-08-27 DIAGNOSIS — G47.33 OSA ON CPAP: ICD-10-CM

## 2021-08-27 DIAGNOSIS — I72.9 ANEURYSM (HCC): ICD-10-CM

## 2021-08-27 DIAGNOSIS — R25.1 TREMOR: Primary | ICD-10-CM

## 2021-08-27 PROCEDURE — G8427 DOCREV CUR MEDS BY ELIG CLIN: HCPCS | Performed by: STUDENT IN AN ORGANIZED HEALTH CARE EDUCATION/TRAINING PROGRAM

## 2021-08-27 PROCEDURE — 1036F TOBACCO NON-USER: CPT | Performed by: STUDENT IN AN ORGANIZED HEALTH CARE EDUCATION/TRAINING PROGRAM

## 2021-08-27 PROCEDURE — 4040F PNEUMOC VAC/ADMIN/RCVD: CPT | Performed by: STUDENT IN AN ORGANIZED HEALTH CARE EDUCATION/TRAINING PROGRAM

## 2021-08-27 PROCEDURE — 3017F COLORECTAL CA SCREEN DOC REV: CPT | Performed by: STUDENT IN AN ORGANIZED HEALTH CARE EDUCATION/TRAINING PROGRAM

## 2021-08-27 PROCEDURE — G8417 CALC BMI ABV UP PARAM F/U: HCPCS | Performed by: STUDENT IN AN ORGANIZED HEALTH CARE EDUCATION/TRAINING PROGRAM

## 2021-08-27 PROCEDURE — 99204 OFFICE O/P NEW MOD 45 MIN: CPT | Performed by: STUDENT IN AN ORGANIZED HEALTH CARE EDUCATION/TRAINING PROGRAM

## 2021-08-27 PROCEDURE — 1123F ACP DISCUSS/DSCN MKR DOCD: CPT | Performed by: STUDENT IN AN ORGANIZED HEALTH CARE EDUCATION/TRAINING PROGRAM

## 2021-08-27 RX ORDER — FOLIC ACID/VIT B COMPLEX AND C 0.8 MG
TABLET ORAL
COMMUNITY
Start: 2021-08-13

## 2021-08-27 NOTE — PROGRESS NOTES
[address] [facility] [address]  [address]  [address]  [phone]      Date of Visit:  2021  Patient Name: Alissa Laboy   Patient :  1955     CHIEF COMPLAINT:     Alissa Laboy is a 78 y.o. male who presents today for an general visit to be evaluated for the following condition(s):  Chief Complaint   Patient presents with    New Patient     Pt states that he has had tremor for about a year. He states that he retired and it seems he has become more aware of it. The tremor is occuring in both hands. It seems to be okay in the morning but get worse in the evening. He does have neuropathy in the feet and left hand. HISTORY OF PRESENT ILLNESS     HPI  William Dejesus is a 41-year-old male with a past medical history of cystic kidney disease, chronic renal insufficiency stage V (on dialysis for the past 14 years), and JENNIFER (compliant with CPAP) who presents for evaluation of tremor that started about a year ago. Tremor started about a year ago, and patient is unsure if tremor has worsened, or if he is noticing it more now that he is retired. Tremor affects his bilateral upper extremities and is worse in the evening than the morning. Tremor severity fluctuates, but he did notice some difficulty with filling out forms the other day. Does not have a family member with tremor. Does not drink alcohol or coffee. Patient does have neuropathy affecting his feet bilaterally and left upper extremity. Patient denies difficulty swallowing, resting tremor, dry eyes, drooling, trouble initiating movement, headaches, dizziness, visual field deficit, or double vision. REVIEW OF SYSTEM      Review of Systems   Constitutional: Negative for diaphoresis and fever. HENT: Negative for congestion and trouble swallowing. Eyes: Negative for photophobia and visual disturbance. Respiratory: Negative for chest tightness and shortness of breath. Cardiovascular: Negative for chest pain. Gastrointestinal: Negative for diarrhea, nausea and vomiting. Musculoskeletal: Negative. Skin: Negative for color change. Neurological: Positive for tremors and numbness. Negative for dizziness, seizures, syncope, facial asymmetry, speech difficulty, weakness, light-headedness and headaches. Psychiatric/Behavioral: Negative for hallucinations and self-injury.        REVIEWED INFORMATION      No Known Allergies    Patient Active Problem List   Diagnosis    JENNIFER on CPAP    Polycystic kidney disease    Essential hypertension    CRI (chronic renal insufficiency), stage 5 (HCC)    Dialysis patient ([facility]    Adenomatous polyp of descending colon    Morbidly obese (HCC)    Inflammatory polyps of colon with other complication ([address]    Allergy, unspecified, initial encounter    Anaphylactic shock, unspecified, initial encounter    Anemia in chronic kidney disease    Aneurysm of arteriovenous fistula ([address]    Angina pectoris, unspecified ([address]    BPH without obstruction/lower urinary tract symptoms    Bradycardia, unspecified    Chronic midline low back pain without sciatica    Coagulation defect, unspecified ([address]    Cramp and spasm    Diarrhea, unspecified    Disorder of phosphorus metabolism, unspecified    Edema of lower extremity    Edema, unspecified    Encounter for immunization    End stage kidney disease ([address]    Extrarenal uremia    Fever, unspecified    First degree atrioventricular block    Fluid overload, unspecified    Headache, unspecified    Hypoglycemia, unspecified    Hypotension of hemodialysis    IFG (impaired fasting glucose)    Localized edema    Nausea    Neuropathy    Nonspecific abnormal electrocardiogram (ECG) (EKG)    Other disorders resulting from impaired renal tubular function    Other fatigue    Iron deficiency anemia, unspecified    Other specified symptoms and signs involving the circulatory and respiratory systems    Pain, unspecified    Polycystic kidney, adult type    Secondary hyperparathyroidism of renal origin ([facility]    Other forms of dyspnea    Sinus bradycardia    Ventricular premature beats       Past Medical History:   Diagnosis Date    Hemodialysis patient ([facility] 05/2008    MWF    Hyperlipidemia     Hypertension     Renal failure     Sleep apnea        Past Surgical History:   Procedure Laterality Date    COLONOSCOPY      Polyps 2010, none in 2015    COLONOSCOPY N/A 2/6/2020    COLORECTAL CANCER SCREENING, HIGH RISK performed by Myles Willard MD at [address] 2008    TONSILLECTOMY AND ADENOIDECTOMY      UVULECTOMY          Social History     Socioeconomic History    Marital status:      Spouse name: None    Number of children: None    Years of education: None    Highest education level: None   Occupational History    None   Tobacco Use    Smoking status: Never Smoker    Smokeless tobacco: Never Used   Vaping Use    Vaping Use: Never used   Substance and Sexual Activity    Alcohol use: Not Currently    Drug use: Not Currently    Sexual activity: None   Other Topics Concern    None   Social History Narrative    None     Social Determinants of Health     Financial Resource Strain: Low Risk     Difficulty of Paying Living Expenses: Not hard at all   Food Insecurity: No Food Insecurity    Worried About Running Out of Food in the Last Year: Never true    Shelby of Food in the Last Year: Never true   Transportation Needs: No Transportation Needs    Lack of Transportation (Medical): No    Lack of Transportation (Non-Medical):  No   Physical Activity:     Days of Exercise per Week:     Minutes of Exercise per Session:    Stress:     Feeling of Stress :    Social Connections:     Frequency of Communication with Friends and Family:     Frequency of Social Gatherings with Friends and Family:     Attends Congregation Services:     Active Member of Clubs or Organizations:     Attends Club or Organization Meetings:     Marital Status:    Intimate Partner Violence:     Fear of Current or Ex-Partner:     Emotionally Abused:     Physically Abused:     Sexually Abused:         PHYSICAL EXAM     Vitals:    08/27/21 0852   BP: 138/80   Site: Left Upper Arm   Position: Sitting   Cuff Size: Medium Adult   Pulse: 90   Weight: (!) 321 lb 12.8 oz (146 kg)     Physical Exam  Vitals and nursing note reviewed. Constitutional:       General: He is awake. Appearance: Normal appearance. HENT:      Head: Normocephalic and atraumatic. Eyes:      General: Lids are normal.      Extraocular Movements: Extraocular movements intact. Conjunctiva/sclera: Conjunctivae normal.      Pupils: Pupils are equal, round, and reactive to light. Cardiovascular:      Rate and Rhythm: Normal rate and regular rhythm. Pulses: Normal pulses. Heart sounds: Normal heart sounds. Pulmonary:      Effort: Pulmonary effort is normal.      Breath sounds: Normal breath sounds. Musculoskeletal:         General: Normal range of motion. Skin:     General: Skin is warm and dry. Neurological:      General: No focal deficit present. Mental Status: He is alert and oriented to person, place, and time. Cranial Nerves: No cranial nerve deficit. Sensory: No sensory deficit. Motor: No weakness. Coordination: Coordination normal.      Gait: Gait normal.      Deep Tendon Reflexes: Reflexes abnormal.   Psychiatric:         Mood and Affect: Mood normal.         Behavior: Behavior normal. Behavior is cooperative. Thought Content:  Thought content normal.     No cerebellar signs including intention tremor, bilateral nystagmus,   Romberg's is positive  Diffusely hyporeflexic  High-frequency very low amplitude tremor that is positional in bilateral upper extremities  Archimedes spiral shows some waves in lines but essentially normal  ASSESSMENT/PLAN   Pleasant 40-year-old male here for evaluation of bilateral upper extremity positional tremor x1 year. We will obtain MRI of the brain given that this is new onset. We will also obtain MRA of the head and neck given that patient has polycystic kidney disease and is at high risk for berry aneurysm. We will obtain EMG of the bilateral upper extremities to assess for idiopathic neuropathy and tremor. Will obtain B12, TSH, A1c, and CMP. Tremor is most consistent with exaggerated physiologic tremor, and could be related to hypercapnia given that it is better in the morning and worse as the day goes on. Consider pulse ox monitoring in the future depending on results of the above. To follow-up in 4 to 6 weeks once EMG, MRI, MRA, and lab work has been completed. No follow-ups on file.     COMMUNICATION:       Electronically signed by Mikael Reina PA-C, PA-C on 8/27/2021 at 9:02 AM

## 2021-08-31 ASSESSMENT — ENCOUNTER SYMPTOMS
DIARRHEA: 0
PHOTOPHOBIA: 0
NAUSEA: 0
TROUBLE SWALLOWING: 0
VOMITING: 0
CHEST TIGHTNESS: 0
COLOR CHANGE: 0
SHORTNESS OF BREATH: 0

## 2021-09-01 ENCOUNTER — TELEPHONE (OUTPATIENT)
Dept: NEUROLOGY | Age: 66
End: 2021-09-01

## 2021-09-01 DIAGNOSIS — R73.09 ELEVATED HEMOGLOBIN A1C: Primary | ICD-10-CM

## 2021-09-24 ENCOUNTER — APPOINTMENT (OUTPATIENT)
Dept: MRI IMAGING | Age: 66
End: 2021-09-24
Payer: MEDICARE

## 2021-09-24 ENCOUNTER — HOSPITAL ENCOUNTER (OUTPATIENT)
Dept: MRI IMAGING | Age: 66
Discharge: HOME | End: 2021-09-26
Payer: MEDICARE

## 2021-09-24 DIAGNOSIS — I72.9 ANEURYSM (HCC): ICD-10-CM

## 2021-09-24 DIAGNOSIS — R25.1 TREMOR: ICD-10-CM

## 2021-09-24 PROCEDURE — 70551 MRI BRAIN STEM W/O DYE: CPT

## 2021-09-24 PROCEDURE — 70544 MR ANGIOGRAPHY HEAD W/O DYE: CPT

## 2021-09-24 PROCEDURE — 70547 MR ANGIOGRAPHY NECK W/O DYE: CPT

## 2021-10-11 ENCOUNTER — HOSPITAL ENCOUNTER (OUTPATIENT)
Dept: NEUROLOGY | Age: 66
Discharge: HOME | End: 2021-10-11
Payer: MEDICARE

## 2021-10-11 DIAGNOSIS — R25.1 TREMOR: ICD-10-CM

## 2021-10-11 DIAGNOSIS — R20.2 PARESTHESIA: ICD-10-CM

## 2021-10-11 PROCEDURE — 95911 NRV CNDJ TEST 9-10 STUDIES: CPT

## 2021-10-11 PROCEDURE — 95886 MUSC TEST DONE W/N TEST COMP: CPT

## 2021-10-11 NOTE — PROCEDURES
[address]                      [address], [address] [facility]                             ELECTROMYOGRAM REPORT    PATIENT NAME: Samuel Nino                 :        1955  MED REC NO:   [id number]                            ROOM:  ACCOUNT NO:   [de-identified]                           ADMIT DATE: 10/11/2021  PROVIDER:     Omar Guardado MD    DATE OF EMG:  10/11/2021    REASON FOR STUDY:  Neurophysiological studies are requested for the  patient's underlying numbness in the hands. FINDINGS:  MOTOR NERVE CONDUCTION STUDIES:  Motor nerve conduction study of the  right median nerve shows mildly prolonged latencies, low amplitudes, and  decreased conduction velocity. Motor nerve conduction study of the left  median nerve is only recordable at the wrist with prolonged latencies  and very low amplitudes. At elbow, no recording is seen. This may be  an artifact secondary to a shunt in his hand. Right ulnar nerve motor  nerve conduction study shows normal peak latency, normal amplitudes, and  borderline conduction velocity. Left ulnar nerve motor nerve conduction  study shows normal peak latencies, low amplitudes, and decreased  conduction velocity seen throughout. F-wave responses are prolonged in  the left ulnar nerve. The left median nerve F-wave responses are not  recordable. SENSORY NERVE CONDUCTION STUDIES:  Right median nerve digit 2  examination is not recordable. Left median digit 2 examination is not  recordable, both in the palm and the digit. The right ulnar digit 2  examination shows borderline peak latency, low amplitudes, and decreased  conduction velocity. The left ulnar digit 2 examination shows normal  peak latency, low amplitudes, and normal conduction velocity. The right  ulnar mixed orthodromic study shows normal peak latencies, low  amplitudes, and decreased conduction velocity.   The left ulnar mixed  orthodromic study shows normal peak latencies, normal amplitudes, and  decreased conduction velocity. Radial sensory nerve conduction studies  on the right are normal.  On the left, they are not recordable. Needle electrode examination of the above-sampled muscles shows very  discrete units in the left abductor pollicis brevis muscle and decreased  units on the right as well. The other muscle examination is normal.    IMPRESSION:  1. 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 2.  Severe left median nerve neuropathy with non-recordable responses  from the elbow down consistent with a possibility of nerve dysfunction  as seen in patients with shunt. In addition to this, the patient has sensory neuronopathy, which could  be related to his underlying renal dysfunction. No active or chronic  denervation is notable. Multiple sensory nerve conduction studies are evaluated to avoid any  artifact or temperature differences. There is no active denervation. This test is personally performed and interpreted by me. Thank you Kathy De La Rosa for asking us to assist in the care of this patient.     With kindest regards,        Mary Moulton MD    D: 10/11/2021 10:14:39       T: 10/11/2021 10:17:26     [id number]  Job#: [id number]     Doc#: [id number]    CC:

## 2021-10-12 PROBLEM — E83.52 HYPERCALCEMIA: Status: ACTIVE | Noted: 2021-07-19

## 2021-10-13 ENCOUNTER — PATIENT MESSAGE (OUTPATIENT)
Dept: FAMILY MEDICINE CLINIC | Age: 66
End: 2021-10-13

## 2021-10-13 RX ORDER — CELECOXIB 200 MG/1
200 CAPSULE ORAL DAILY
Qty: 90 CAPSULE | Refills: 3 | Status: SHIPPED | OUTPATIENT
Start: 2021-10-13 | End: 2022-09-05 | Stop reason: SDUPTHER

## 2021-10-15 ENCOUNTER — OFFICE VISIT (OUTPATIENT)
Dept: NEUROLOGY | Age: 66
End: 2021-10-15
Payer: MEDICARE

## 2021-10-15 VITALS
DIASTOLIC BLOOD PRESSURE: 52 MMHG | SYSTOLIC BLOOD PRESSURE: 112 MMHG | WEIGHT: 315 LBS | BODY MASS INDEX: 44.16 KG/M2 | HEART RATE: 66 BPM

## 2021-10-15 DIAGNOSIS — G40.89 OTHER SEIZURES (HCC): ICD-10-CM

## 2021-10-15 DIAGNOSIS — G25.3 MYOCLONIC JERKING: Primary | ICD-10-CM

## 2021-10-15 DIAGNOSIS — G56.01 RIGHT CARPAL TUNNEL SYNDROME: ICD-10-CM

## 2021-10-15 PROBLEM — R56.9 UNSPECIFIED CONVULSIONS (HCC): Status: ACTIVE | Noted: 2021-10-15

## 2021-10-15 PROCEDURE — 4040F PNEUMOC VAC/ADMIN/RCVD: CPT | Performed by: STUDENT IN AN ORGANIZED HEALTH CARE EDUCATION/TRAINING PROGRAM

## 2021-10-15 PROCEDURE — G8427 DOCREV CUR MEDS BY ELIG CLIN: HCPCS | Performed by: STUDENT IN AN ORGANIZED HEALTH CARE EDUCATION/TRAINING PROGRAM

## 2021-10-15 PROCEDURE — 1123F ACP DISCUSS/DSCN MKR DOCD: CPT | Performed by: STUDENT IN AN ORGANIZED HEALTH CARE EDUCATION/TRAINING PROGRAM

## 2021-10-15 PROCEDURE — G8417 CALC BMI ABV UP PARAM F/U: HCPCS | Performed by: STUDENT IN AN ORGANIZED HEALTH CARE EDUCATION/TRAINING PROGRAM

## 2021-10-15 PROCEDURE — G8484 FLU IMMUNIZE NO ADMIN: HCPCS | Performed by: STUDENT IN AN ORGANIZED HEALTH CARE EDUCATION/TRAINING PROGRAM

## 2021-10-15 PROCEDURE — 3017F COLORECTAL CA SCREEN DOC REV: CPT | Performed by: STUDENT IN AN ORGANIZED HEALTH CARE EDUCATION/TRAINING PROGRAM

## 2021-10-15 PROCEDURE — 1036F TOBACCO NON-USER: CPT | Performed by: STUDENT IN AN ORGANIZED HEALTH CARE EDUCATION/TRAINING PROGRAM

## 2021-10-15 PROCEDURE — 99214 OFFICE O/P EST MOD 30 MIN: CPT | Performed by: STUDENT IN AN ORGANIZED HEALTH CARE EDUCATION/TRAINING PROGRAM

## 2021-10-15 RX ORDER — LIDOCAINE AND PRILOCAINE 25; 25 MG/G; MG/G
CREAM TOPICAL
COMMUNITY
Start: 2021-08-10 | End: 2022-02-14 | Stop reason: ALTCHOICE

## 2021-10-15 RX ORDER — CALCIUM ACETATE 667 MG/1
2 CAPSULE ORAL 3 TIMES DAILY
COMMUNITY
Start: 2021-08-03

## 2021-10-15 RX ORDER — LEVETIRACETAM 250 MG/1
250 TABLET ORAL 2 TIMES DAILY
Qty: 180 TABLET | Refills: 2 | Status: SHIPPED | OUTPATIENT
Start: 2021-10-15 | End: 2021-12-03 | Stop reason: ALTCHOICE

## 2021-10-15 ASSESSMENT — ENCOUNTER SYMPTOMS
COLOR CHANGE: 0
CHEST TIGHTNESS: 0
DIARRHEA: 0
SHORTNESS OF BREATH: 0
TROUBLE SWALLOWING: 0
VOMITING: 0
PHOTOPHOBIA: 0
NAUSEA: 0

## 2021-10-15 NOTE — PROGRESS NOTES
[address] [facility] NEUROLOGY  [address]  [phone]      Date of Visit:  10/15/2021  Patient Name: Nirali Jensen   Patient :  1955     CHIEF COMPLAINT:     Nirali Jensen is a 78 y.o. male who presents today for an general visit to be evaluated for the following condition(s):  No chief complaint on file. HISTORY OF PRESENT ILLNESS     HPI  10/15/21  Since last visit, MRI of the brain was ordered which did not show any intracranial normalities including mass. MRA of the brain did not show any new rhythms. MRA of the neck unremarkable. TSH, B12, and folate within normal limits. Creatinine found to be 11.31. EMG of the bilateral upper extremities showed there right median nerve neuropathy and severe left median neuropathy with nonrecordable responses from the elbow down consistent with nerve dysfunction related to shunt placement. 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 Patient's tremor today is minimal, and he reports that his tremor typically improves following dialysis. No new or worsening symptoms. 21  Kanika Jones is a 58-year-old male with a past medical history of cystic kidney disease, chronic renal insufficiency stage V (on dialysis for the past 14 years), and JENNIFER (compliant with CPAP) who presents for evaluation of tremor that started about a year ago. Tremor started about a year ago, and patient is unsure if tremor has worsened, or if he is noticing it more now that he is retired. Tremor affects his bilateral upper extremities and is worse in the evening than the morning. Tremor severity fluctuates, but he did notice some difficulty with filling out forms the other day. Does not have a family member with tremor. Does not drink alcohol or coffee. Patient does have neuropathy affecting his feet bilaterally and left upper extremity.     Patient denies difficulty swallowing, resting tremor, dry eyes, drooling, trouble initiating movement, headaches, dizziness, visual field deficit, or double vision. REVIEW OF SYSTEM      Review of Systems   Constitutional: Negative for diaphoresis and fever. HENT: Negative for congestion and trouble swallowing. Eyes: Negative for photophobia and visual disturbance. Respiratory: Negative for chest tightness and shortness of breath. Cardiovascular: Negative for chest pain. Gastrointestinal: Negative for diarrhea, nausea and vomiting. Musculoskeletal: Negative. Skin: Negative for color change. Neurological: Positive for tremors and numbness. Negative for dizziness, seizures, syncope, facial asymmetry, speech difficulty, weakness, light-headedness and headaches. Psychiatric/Behavioral: Negative for hallucinations and self-injury.        REVIEWED INFORMATION      No Known Allergies    Patient Active Problem List   Diagnosis    JENNIFER on CPAP    Polycystic kidney disease    Essential hypertension    CRI (chronic renal insufficiency), stage 5 (HCC)    D[facility] ([facility]    Adenomatous polyp of descending colon    Morbidly obese (HCC)    Inflammatory polyps of colon with other complication ([facility]    Allergy, unspecified, initial encounter    Anaphylactic shock, unspecified, initial encounter    Anemia in chronic kidney disease    Aneurysm of arteriovenous fistula ([address]    Angina pectoris, unspecified ([address]    BPH without obstruction/lower urinary tract symptoms    Bradycardia, unspecified    Chronic midline low back pain without sciatica    Coagulation defect, unspecified ([address]    Cramp and spasm    Diarrhea, unspecified    Disorder of phosphorus metabolism, unspecified    Edema of lower extremity    Edema, unspecified    Encounter for immunization    End stage kidney disease ([address]    Extrarenal uremia    Fever, unspecified    First degree atrioventricular block    Fluid overload, unspecified    Headache, unspecified    Hypoglycemia, unspecified    Hypotension of hemodialysis    IFG (impaired fasting glucose)    Localized edema    Nausea    Neuropathy    Nonspecific abnormal electrocardiogram (ECG) (EKG)    Other disorders resulting from impaired renal tubular function    Other fatigue    Iron deficiency anemia, unspecified    Other specified symptoms and signs involving the circulatory and respiratory systems    Pain, unspecified    Polycystic kidney, adult type    Secondary hyperparathyroidism of renal origin ([facility]    Other forms of dyspnea    Sinus bradycardia    Ventricular premature beats    Hypercalcemia       Past Medical History:   Diagnosis Date    Hemodialysis patient ([facility] 05/2008    MWF    Hyperlipidemia     Hypertension     Renal failure     Sleep apnea        Past Surgical History:   Procedure Laterality Date    COLONOSCOPY      Polyps 2010, none in 2015    COLONOSCOPY N/A 2/6/2020    COLORECTAL CANCER SCREENING, HIGH RISK performed by Shirin Sidhu MD at [address] Left 2008    TONSILLECTOMY AND ADENOIDECTOMY      UVULECTOMY          Social History     Socioeconomic History    Marital status:      Spouse name: Not on file    Number of children: Not on file    Years of education: Not on file    Highest education level: Not on file   Occupational History    Not on file   Tobacco Use    Smoking status: Never Smoker    Smokeless tobacco: Never Used   Vaping Use    Vaping Use: Never used   Substance and Sexual Activity    Alcohol use: Not Currently    Drug use: Not Currently    Sexual activity: Not on file   Other Topics Concern    Not on file   Social History Narrative    Not on file     Social Determinants of Health     Financial Resource Strain: Low Risk     Difficulty of Paying Living Expenses: Not hard at all   Food Insecurity: No Food Insecurity    Worried About Running Out of Food in the Last Year: Never true    [address] in the Last Year: Never true   Transportation Needs: No Transportation Needs    Lack of Transportation (Medical): No    Lack of Transportation (Non-Medical): No   Physical Activity:     Days of Exercise per Week:     Minutes of Exercise per Session:    Stress:     Feeling of Stress :    Social Connections:     Frequency of Communication with Friends and Family:     Frequency of Social Gatherings with Friends and Family:     Attends Alevism Services:     Active Member of Clubs or Organizations:     Attends Club or Organization Meetings:     Marital Status:    Intimate Partner Violence:     Fear of Current or Ex-Partner:     Emotionally Abused:     Physically Abused:     Sexually Abused:         PHYSICAL EXAM     There were no vitals filed for this visit. Physical Exam  Vitals and nursing note reviewed. Constitutional:       General: He is awake. Appearance: Normal appearance. HENT:      Head: Normocephalic and atraumatic. Eyes:      General: Lids are normal.      Extraocular Movements: Extraocular movements intact. Conjunctiva/sclera: Conjunctivae normal.      Pupils: Pupils are equal, round, and reactive to light. Cardiovascular:      Rate and Rhythm: Normal rate and regular rhythm. Pulses: Normal pulses. Heart sounds: Normal heart sounds. Pulmonary:      Effort: Pulmonary effort is normal.      Breath sounds: Normal breath sounds. Musculoskeletal:         General: Normal range of motion. Skin:     General: Skin is warm and dry. Neurological:      General: No focal deficit present. Mental Status: He is alert and oriented to person, place, and time. Cranial Nerves: No cranial nerve deficit. Sensory: No sensory deficit. Motor: No weakness.       Coordination: Coordination normal.      Gait: Gait normal.      Deep Tendon Reflexes: Reflexes abnormal.   Psychiatric:         Mood and Affect: Mood normal.         Behavior: Behavior normal. Behavior is cooperative. Thought Content: Thought content normal.     No cerebellar signs including intention tremor, bilateral nystagmus,   Romberg's is positive  Diffusely hyporeflexic  High-frequency very low amplitude tremor that is positional in bilateral upper extremities  Archimedes spiral shows some waves in lines but essentially normal  ASSESSMENT/PLAN   Pleasant 77-year-old male here for evaluation of bilateral upper extremity myoclonic jerking x1 year. Since last visit, MRI of the brain was ordered which did not show any intracranial normalities including mass. MRA of the brain did not show any new rhythms. MRA of the neck unremarkable. TSH, B12, and folate within normal limits. Creatinine found to be 11.31. EMG of the bilateral upper extremities showed there right median nerve neuropathy and severe left median neuropathy with nonrecordable responses from the elbow down consistent with nerve dysfunction related to shunt placement. 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 Patient is experiencing myoclonic jerking secondary to severe azotemia. Patient's myoclonic jerks decrease following dialysis which is consistent with this. We will start patient on 250 mg of Keppra twice daily which we have seen greatly improves myoclonus secondary to azotemia. Given that Keppra is renally cleared, and that dialysis will decrease this dose, we may need to make some dose adjustments in the future. Patient to follow-up in 4 to 6 weeks virtually to see how he was doing on this medication. We will discuss at that time whether patient would like a referral to orthopedic surgery for median nerve decompression given his severe carpal tunnel on the right.       COMMUNICATION:       Electronically signed by Kasey Grove PA-C, PA-C on 10/15/2021 at 8:36 AM

## 2021-10-23 PROCEDURE — 95886 MUSC TEST DONE W/N TEST COMP: CPT | Performed by: PSYCHIATRY & NEUROLOGY

## 2021-10-23 PROCEDURE — 95913 NRV CNDJ TEST 13/> STUDIES: CPT | Performed by: PSYCHIATRY & NEUROLOGY

## 2021-12-03 ENCOUNTER — VIRTUAL VISIT (OUTPATIENT)
Dept: NEUROLOGY | Age: 66
End: 2021-12-03
Payer: MEDICARE

## 2021-12-03 DIAGNOSIS — N28.9 RENAL NEUROPATHY (HCC): ICD-10-CM

## 2021-12-03 DIAGNOSIS — G25.3 MYOCLONIC JERKING: Primary | ICD-10-CM

## 2021-12-03 DIAGNOSIS — G63 RENAL NEUROPATHY (HCC): ICD-10-CM

## 2021-12-03 PROCEDURE — 1123F ACP DISCUSS/DSCN MKR DOCD: CPT | Performed by: STUDENT IN AN ORGANIZED HEALTH CARE EDUCATION/TRAINING PROGRAM

## 2021-12-03 PROCEDURE — G8427 DOCREV CUR MEDS BY ELIG CLIN: HCPCS | Performed by: STUDENT IN AN ORGANIZED HEALTH CARE EDUCATION/TRAINING PROGRAM

## 2021-12-03 PROCEDURE — 99213 OFFICE O/P EST LOW 20 MIN: CPT | Performed by: STUDENT IN AN ORGANIZED HEALTH CARE EDUCATION/TRAINING PROGRAM

## 2021-12-03 PROCEDURE — 3017F COLORECTAL CA SCREEN DOC REV: CPT | Performed by: STUDENT IN AN ORGANIZED HEALTH CARE EDUCATION/TRAINING PROGRAM

## 2021-12-03 PROCEDURE — 4040F PNEUMOC VAC/ADMIN/RCVD: CPT | Performed by: STUDENT IN AN ORGANIZED HEALTH CARE EDUCATION/TRAINING PROGRAM

## 2021-12-03 RX ORDER — LEVETIRACETAM 250 MG/1
375 TABLET ORAL 2 TIMES DAILY
Qty: 270 TABLET | Refills: 3 | Status: SHIPPED | OUTPATIENT
Start: 2021-12-03 | End: 2022-08-17

## 2021-12-03 ASSESSMENT — ENCOUNTER SYMPTOMS
COLOR CHANGE: 0
PHOTOPHOBIA: 0
SHORTNESS OF BREATH: 0
TROUBLE SWALLOWING: 0
NAUSEA: 0
VOMITING: 0
CHEST TIGHTNESS: 0
DIARRHEA: 0

## 2021-12-03 NOTE — PROGRESS NOTES
[address]  [address]  [place] [address]  [phone]      Date of Visit:  12/3/2021  Patient Name: Malik Segundo   Patient :  1955     CHIEF COMPLAINT:     Malik Segundo is a 78 y.o. male who presents today for an general visit to be evaluated for the following condition(s):  No chief complaint on file. HISTORY OF PRESENT ILLNESS     HPI  12/3/21  Since last visit, patient started on Keppra 250 mg BID for myoclonic jerking related to renal failure. Patient reports that the Hildred Leer has helped with his myoclonic jerking but has not completely resolve the issue. Is tolerating the medication well without dysphoric mood. At this time, patient is not a candidate for renal transplant due to BMI. Plans for possible upcoming gastric sleeve. Due to COVID 19 outbreak, patient's office visit was converted to a virtual visit. Patient was contacted and agreed to proceed with a virtual visit via Telephone Visit  The risks and benefits of converting to a virtual visit were discussed in light of the current infectious disease epidemic. Patient also understood that insurance coverage and co-pays are up to their individual insurance plans. 10/15/21  Since last visit, MRI of the brain was ordered which did not show any intracranial normalities including mass. MRA of the brain did not show any new rhythms. MRA of the neck unremarkable. TSH, B12, and folate within normal limits. Creatinine found to be 11.31. EMG of the bilateral upper extremities showed there right median nerve neuropathy and severe left median neuropathy with nonrecordable responses from the elbow down consistent with nerve dysfunction related to shunt placement. 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 Patient's tremor today is minimal, and he reports that his tremor typically improves following dialysis. No new or worsening symptoms. 8/27/21  Toshia Palafox is a 77-year-old male with a past medical history of cystic kidney disease, chronic renal insufficiency stage V (on dialysis for the past 14 years), and JENNIFER (compliant with CPAP) who presents for evaluation of tremor that started about a year ago. Tremor started about a year ago, and patient is unsure if tremor has worsened, or if he is noticing it more now that he is retired. Tremor affects his bilateral upper extremities and is worse in the evening than the morning. Tremor severity fluctuates, but he did notice some difficulty with filling out forms the other day. Does not have a family member with tremor. Does not drink alcohol or coffee. Patient does have neuropathy affecting his feet bilaterally and left upper extremity. Patient denies difficulty swallowing, resting tremor, dry eyes, drooling, trouble initiating movement, headaches, dizziness, visual field deficit, or double vision. REVIEW OF SYSTEM      Review of Systems   Constitutional: Negative for diaphoresis and fever. HENT: Negative for congestion and trouble swallowing. Eyes: Negative for photophobia and visual disturbance. Respiratory: Negative for chest tightness and shortness of breath. Cardiovascular: Negative for chest pain. Gastrointestinal: Negative for diarrhea, nausea and vomiting. Musculoskeletal: Negative. Skin: Negative for color change. Neurological: Positive for tremors and numbness. Negative for dizziness, seizures, syncope, facial asymmetry, speech difficulty, weakness, light-headedness and headaches. Psychiatric/Behavioral: Negative for hallucinations and self-injury.        REVIEWED INFORMATION      No Known Allergies    Patient Active Problem List   Diagnosis    JENNIFER on CPAP    Polycystic kidney disease    Essential hypertension    CRI [facility]chronic renal insufficiency), stage 5 ([facility]    Dialysis patient ([facility]    Adenomatous polyp of descending CREATION Left 2008    TONSILLECTOMY AND ADENOIDECTOMY      UVULECTOMY          Social History     Socioeconomic History    Marital status:      Spouse name: Not on file    Number of children: Not on file    Years of education: Not on file    Highest education level: Not on file   Occupational History    Not on file   Tobacco Use    Smoking status: Never Smoker    Smokeless tobacco: Never Used   Vaping Use    Vaping Use: Never used   Substance and Sexual Activity    Alcohol use: Not Currently    Drug use: Not Currently    Sexual activity: Not on file   Other Topics Concern    Not on file   Social History Narrative    Not on file     Social Determinants of Health     Financial Resource Strain: Low Risk     Difficulty of Paying Living Expenses: Not hard at all   Food Insecurity: No Food Insecurity    Worried About [address] in the Last Year: Never true    [address] [facility] in the Last Year: Never true   Transportation Needs: No Transportation Needs    Lack of Transportation (Medical): No    Lack of Transportation (Non-Medical):  No   Physical Activity:     Days of Exercise per Week: Not on file    Minutes of Exercise per Session: Not on file   Stress:     Feeling of Stress : Not on file   Social Connections:     Frequency of Communication with Friends and Family: Not on file    Frequency of Social Gatherings with Friends and Family: Not on file    Attends Moravian Services: Not on file    Active Member of Clubs or Organizations: Not on file    Attends Club or Organization Meetings: Not on file    Marital Status: Not on file   Intimate Partner Violence:     Fear of Current or Ex-Partner: Not on file    Emotionally Abused: Not on file    Physically Abused: Not on file    Sexually Abused: Not on file   Housing Stability:     Unable to Pay for Housing in the Last Year: Not on file    Number of [place] in the Last Year: Not on file    Unstable Housing in the Last Year: Not on file        PHYSICAL EXAM     Deferred due to virtual visit    ASSESSMENT/PLAN   Pleasant 69-year-old male here for evaluation of bilateral upper extremity myoclonic jerking x1 year. Work up thus far has included MRI of the brain was ordered which did not show any intracranial normalities including mass. MRA of the brain did not show any new rhythms. MRA of the neck unremarkable. TSH, B12, and folate within normal limits. Creatinine found to be 11.31. EMG of the bilateral upper extremities showed there right median nerve neuropathy and severe left median neuropathy with nonrecordable responses from the elbow down consistent with nerve dysfunction related to shunt placement. 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 Patient is experiencing myoclonic jerking secondary to severe azotemia. Patient's myoclonic jerks decrease following dialysis which is consistent with this. At last visit, patient was start on 250 mg of Keppra twice daily which has improved his symptoms without completely eliminating them. Tolerating medication well. Will increase to 1.5 tablets (325 mg) twice daily to sees greater benefit. Will proceed with caution given patient's renal failure and will also obtain Keppra level. Will not exceed 500 mg BID. Plan for possible gastric bypass surgery as patient is not currently a candidate for kidney transplant due to BMI. Will follow up in 4-6 months.       COMMUNICATION:       Electronically signed by Wanda Rivera PA-C, JOSIAH on 12/3/2021 at 8:14 AM

## 2021-12-20 ENCOUNTER — VIRTUAL VISIT (OUTPATIENT)
Dept: FAMILY MEDICINE CLINIC | Age: 66
End: 2021-12-20
Payer: MEDICARE

## 2021-12-20 DIAGNOSIS — Z00.00 ROUTINE GENERAL MEDICAL EXAMINATION AT A HEALTH CARE FACILITY: Primary | ICD-10-CM

## 2021-12-20 DIAGNOSIS — I20.9 ANGINA PECTORIS, UNSPECIFIED (HCC): ICD-10-CM

## 2021-12-20 DIAGNOSIS — N25.81 SECONDARY HYPERPARATHYROIDISM OF RENAL ORIGIN (HCC): ICD-10-CM

## 2021-12-20 DIAGNOSIS — D68.9 COAGULATION DEFECT, UNSPECIFIED (HCC): ICD-10-CM

## 2021-12-20 DIAGNOSIS — R73.9 HYPERGLYCEMIA: ICD-10-CM

## 2021-12-20 PROCEDURE — 3017F COLORECTAL CA SCREEN DOC REV: CPT | Performed by: FAMILY MEDICINE

## 2021-12-20 PROCEDURE — 1123F ACP DISCUSS/DSCN MKR DOCD: CPT | Performed by: FAMILY MEDICINE

## 2021-12-20 PROCEDURE — G8484 FLU IMMUNIZE NO ADMIN: HCPCS | Performed by: FAMILY MEDICINE

## 2021-12-20 PROCEDURE — 4040F PNEUMOC VAC/ADMIN/RCVD: CPT | Performed by: FAMILY MEDICINE

## 2021-12-20 PROCEDURE — G0439 PPPS, SUBSEQ VISIT: HCPCS | Performed by: FAMILY MEDICINE

## 2021-12-20 ASSESSMENT — PATIENT HEALTH QUESTIONNAIRE - PHQ9
SUM OF ALL RESPONSES TO PHQ QUESTIONS 1-9: 0
2. FEELING DOWN, DEPRESSED OR HOPELESS: 0
1. LITTLE INTEREST OR PLEASURE IN DOING THINGS: 0
SUM OF ALL RESPONSES TO PHQ9 QUESTIONS 1 & 2: 0
SUM OF ALL RESPONSES TO PHQ QUESTIONS 1-9: 0
SUM OF ALL RESPONSES TO PHQ QUESTIONS 1-9: 0

## 2021-12-20 NOTE — PROGRESS NOTES
Medicare Annual Wellness Visit  Name: Francisco Aguilar Date: 2021   MRN: [id number] Sex: Male   Age: 78 y.o. Ethnicity: Non- / Non    : 1955 Race: White (non-)      Dru Mcdonald is here for Nohemi Roldan AWV (Telephone Medicare AWV)    Screenings for behavioral, psychosocial and functional/safety risks, and cognitive dysfunction are all negative except as indicated below. These results, as well as other patient data from the [address] form, are documented in Flowsheets linked to this Encounter. This encounter was performed under my, Jessenia Gerber, direct supervision, 2021. No Known Allergies    Prior to Visit Medications    Medication Sig Taking?  Authorizing Provider   levETIRAcetam (KEPPRA) 250 MG tablet Take 1.5 tablets by mouth 2 times daily Yes Ashtyn Cherry PA-C   Calcium Acetate, Phos Binder, 667 MG CAPS Take 2 capsules by mouth 3 times daily  Yes Historical Provider, MD   lidocaine-prilocaine (EMLA) 2.5-2.5 % cream Apply topically Yes Historical Provider, MD   celecoxib (CELEBREX) 200 MG capsule Take 1 capsule by mouth daily Yes Lucio Driscoll MD   B Complex-C-Folic Acid (KAYE-LEILANI) TABS TAKE 1 TABLET BY MOUTH EVERY DAY Yes Historical Provider, MD   gabapentin (NEURONTIN) 300 MG capsule TK ONE C PO HS AFTER DIALYSIS 5 DAYS A WEEK Yes Lucio Driscoll MD   VASCEPA 1 g CAPS capsule TK 2 CS PO BID Yes Historical Provider, MD   torsemide (DEMADEX) 100 MG tablet Take 100 mg by mouth Yes Historical Provider, MD   rosuvastatin (CRESTOR) 10 MG tablet Take 10 mg by mouth Yes Historical Provider, MD   cinacalcet (SENSIPAR) 30 MG tablet TAKE 1 TABLET BY MOUTH ONCE A DAY AS DIRECTED WITH THE EVENING MEAL Yes Historical Provider, MD   aspirin (ECOTRIN LOW STRENGTH) 81 MG EC tablet Take by mouth Yes Historical Provider, MD   lanthanum (FOSRENOL) 1000 MG chewable tablet Take 1 tablet by mouth 3 times daily  Patient not taking: Reported on 12/20/2021  Historical Provider, MD       Past Medical History:   Diagnosis Date    Chronic kidney disease     Hemodialysis patient ([address] 05/2008    MWF    Hyperlipidemia     Hypertension     Renal failure     Sleep apnea        Past Surgical History:   Procedure Laterality Date    COLONOSCOPY      Polyps 2010, none in 2015    COLONOSCOPY N/A 2/6/2020    COLORECTAL CANCER SCREENING, HIGH RISK performed by Sunny Essex, MD at [address] Left 2008    TONSILLECTOMY AND ADENOIDECTOMY      UVULECTOMY         Family History   Problem Relation Age of Onset    Kidney Disease Mother     Cancer Father     Heart Attack Father        CareTeam (Including outside providers/suppliers regularly involved in providing care):   Patient Care Team:  Chiqui Mccray MD as PCP - General (Family Medicine)  Chiqui Mccray MD as PCP - [facility] Empaneled Provider  Sekou Ruiz PA-C (Physician Assistant)  Ollie Verma MD as Consulting Physician (Neurology)    Wt Readings from Last 3 Encounters:   10/15/21 (!) 316 lb 9.6 oz (143.6 kg)   08/27/21 (!) 321 lb 12.8 oz (146 kg)   07/15/21 (!) 325 lb 9.6 oz (147.7 kg)     There were no vitals filed for this visit. There is no height or weight on file to calculate BMI. Based upon direct observation of the patient, evaluation of cognition reveals recent and remote memory intact. Patient's complete Health Risk Assessment and screening values have been reviewed and are found in Flowsheets. The following problems were reviewed today and where indicated follow up appointments were made and/or referrals ordered.     Positive Risk Factor Screenings with Interventions:           Health Habits/Nutrition:  Health Habits/Nutrition  Do you exercise for at least 20 minutes 2-3 times per week?: Yes  Have you lost any weight without trying in the past 3 months?: No  Do you eat only one meal per day?: No  Have you seen the dentist within the past year?: Yes     Health Habits/Nutrition Interventions:  · Nutritional issues:  educational materials for healthy, well-balanced diet provided       Personalized Preventive Plan   Current Health Maintenance Status  Immunization History   Administered Date(s) Administered    COVID-19, More Estimable, Primary or Immunocompromised, PF, 100mcg/0.5mL 03/17/2021, 04/14/2021, 11/13/2021    Hepatitis B 06/26/2007, 07/24/2007, 01/22/2008    Hepatitis B Ped/Adol (Engerix-B, Recombivax HB) 06/26/2007, 07/24/2007, 01/22/2008    Hepatitis B vaccine 06/26/2007, 07/24/2007, 01/22/2008, 03/11/2013    Influenza, High Dose (Fluzone 65 yrs and older) 09/25/2019, 10/01/2020    PPD Test 05/30/2007    Pneumococcal Conjugate 13-valent ([id number]) 08/22/2016    Pneumococcal Polysaccharide ([id number]) 07/05/2007, 12/09/2013, 09/17/2018        Health Maintenance   Topic Date Due    Hepatitis B vaccine (1 of 3 - Risk Recombivax 3-dose series) 08/16/1973    DTaP/Tdap/Td vaccine (1 - Tdap) Never done    Shingles Vaccine (1 of 2) Never done    Lipid screen  02/06/2021    Annual Wellness Visit (AWV)  06/18/2021    A1C test (Diabetic or Prediabetic)  11/27/2021    Potassium monitoring  08/27/2022    Creatinine monitoring  08/27/2022    Pneumococcal 65+ yrs at Risk Vaccine (2 of 2 - PPSV23) 09/17/2023    Colon cancer screen colonoscopy  02/06/2025    Flu vaccine  Completed    COVID-19 Vaccine  Completed    Hepatitis C screen  Completed    Hepatitis A vaccine  Aged Out    Hib vaccine  Aged Out    Meningococcal (ACWY) vaccine  Aged Out     Recommendations for [employer]gun Due: see orders and patient instructions/AVS.  . AVS mailed to pt. Recommended screening schedule for the next 5-10 years is provided to the patient in written form: see Patient Instructions/AVS.    Donaldo CAPUTO LPN, 24/17/5117, performed the documented evaluation under the direct supervision of the attending physician.

## 2021-12-20 NOTE — PATIENT INSTRUCTIONS
Personalized Preventive Plan for Malina Chavez - 12/20/2021  Medicare offers a range of preventive health benefits. Some of the tests and screenings are paid in full while other may be subject to a deductible, co-insurance, and/or copay. Some of these benefits include a comprehensive review of your medical history including lifestyle, illnesses that may run in your family, and various assessments and screenings as appropriate. After reviewing your medical record and screening and assessments performed today your provider may have ordered immunizations, labs, imaging, and/or referrals for you. A list of these orders (if applicable) as well as your Preventive Care list are included within your After Visit Summary for your review. Other Preventive Recommendations:    · A preventive eye exam performed by an eye specialist is recommended every 1-2 years to screen for glaucoma; cataracts, macular degeneration, and other eye disorders. · A preventive dental visit is recommended every 6 months. · Try to get at least 150 minutes of exercise per week or 10,000 steps per day on a pedometer . · Order or download the FREE \"Exercise & Physical Activity: Your Everyday Guide\" from [employer]. Call 4-376.474.9575 or search [employer] online. · You need 0536-9356 mg of calcium and 2854-1000 IU of vitamin D per day. It is possible to meet your calcium requirement with diet alone, but a vitamin D supplement is usually necessary to meet this goal.  · When exposed to the sun, use a sunscreen that protects against both UVA and UVB radiation with an SPF of 30 or greater. Reapply every 2 to 3 hours or after sweating, drying off with a towel, or swimming. · Always wear a seat belt when traveling in a car. Always wear a helmet when riding a bicycle or motorcycle. Heart-Healthy Diet   Sodium, Fat, and Cholesterol Controlled Diet       What Is a Heart Healthy Diet?    A heart-healthy diet is one that limits sodium , certain types of fat , and cholesterol . This type of diet is recommended for:   People with any form of cardiovascular disease (eg, coronary heart disease , peripheral vascular disease , previous heart attack , previous stroke )   People with risk factors for cardiovascular disease, such as high blood pressure , high cholesterol , or diabetes   Anyone who wants to lower their risk of developing cardiovascular disease   Sodium    Sodium is a mineral found in many foods. In general, most people consume much more sodium than they need. Diets high in sodium can increase blood pressure and lead to edema (water retention). On a heart-healthy diet, you should consume no more than 2,300 mg (milligrams) of sodium per dayabout the amount in one teaspoon of table salt. The foods highest in sodium include table salt (about 50% sodium), processed foods, convenience foods, and preserved foods. Cholesterol    Cholesterol is a fat-like, waxy substance in your blood. Our bodies make some cholesterol. It is also found in animal products, with the highest amounts in fatty meat, egg yolks, whole milk, cheese, shellfish, and organ meats. On a heart-healthy diet, you should limit your cholesterol intake to less than 200 mg per day. It is normal and important to have some cholesterol in your bloodstream. But too much cholesterol can cause plaque to build up within your arteries, which can eventually lead to a heart attack or stroke. The two types of cholesterol that are most commonly referred to are:   Low-density lipoprotein (LDL) cholesterol  Also known as bad cholesterol, this is the cholesterol that tends to build up along your arteries. Bad cholesterol levels are increased by eating fats that are saturated or hydrogenated. Optimal level of this cholesterol is less than 100. Over 130 starts to get risky for heart disease.    High-density lipoprotein (HDL) cholesterol  Also known as good cholesterol, this type of cholesterol actually carries cholesterol away from your arteries and may, therefore, help lower your risk of having a heart attack. You want this level to be high (ideally greater than 60). It is a risk to have a level less than 40. You can raise this good cholesterol by eating olive oil, canola oil, avocados, or nuts. Exercise raises this level, too. Fat    Fat is calorie dense and packs a lot of calories into a small amount of food. Even though fats should be limited due to their high calorie content, not all fats are bad. In fact, some fats are quite healthful. Fat can be broken down into four main types. The good-for-you fats are:   Monounsaturated fat  found in oils such as olive and canola, avocados, and nuts and natural nut butters; can decrease cholesterol levels, while keeping levels of HDL cholesterol high   Polyunsaturated fat  found in oils such as safflower, sunflower, soybean, corn, and sesame; can decrease total cholesterol and LDL cholesterol   Omega-3 fatty acids  particularly those found in fatty fish (such as salmon, trout, tuna, mackerel, herring, and sardines); can decrease risk of arrhythmias, decrease triglyceride levels, and slightly lower blood pressure   The fats that you want to limit are:   Saturated fat  found in animal products, many fast foods, and a few vegetables; increases total blood cholesterol, including LDL levels   Animal fats that are saturated include: butter, lard, whole-milk dairy products, meat fat, and poultry skin   Vegetable fats that are saturated include: hydrogenated shortening, palm oil, coconut oil, cocoa butter   Hydrogenated or trans fat  found in margarine and vegetable shortening, most shelf stable snack foods, and fried foods; increases LDL and decreases HDL     It is generally recommended that you limit your total fat for the day to less than 30% of your total calories.  If you follow an 1800-calorie heart healthy diet, for example, this would mean 60 grams of fat or less per day. Saturated fat and trans fat in your diet raises your blood cholesterol the most, much more than dietary cholesterol does. For this reason, on a heart-healthy diet, less than 7% of your calories should come from saturated fat and ideally 0% from trans fat. On an 1800-calorie diet, this translates into less than 14 grams of saturated fat per day, leaving 46 grams of fat to come from mono- and polyunsaturated fats.    Food Choices on a Heart Healthy Diet   Food Category   Foods Recommended   Foods to Avoid   Grains   Breads and rolls without salted tops Most dry and cooked cereals Unsalted crackers and breadsticks Low-sodium or homemade breadcrumbs or stuffing All rice and pastas   Breads, rolls, and crackers with salted tops High-fat baked goods (eg, muffins, donuts, pastries) Quick breads, self-rising flour, and biscuit mixes Regular bread crumbs Instant hot cereals Commercially prepared rice, pasta, or stuffing mixes   Vegetables   Most fresh, frozen, and low-sodium canned vegetables Low-sodium and salt-free vegetable juices Canned vegetables if unsalted or rinsed   Regular canned vegetables and juices, including sauerkraut and pickled vegetables Frozen vegetables with sauces Commercially prepared potato and vegetable mixes   Fruits   Most fresh, frozen, and canned fruits All fruit juices   Fruits processed with salt or sodium   Milk   Nonfat or low-fat (1%) milk Nonfat or low-fat yogurt Cottage cheese, low-fat ricotta, cheeses labeled as low-fat and low-sodium   Whole milk Reduced-fat (2%) milk Malted and chocolate milk Full fat yogurt Most cheeses (unless low-fat and low salt) Buttermilk (no more than 1 cup per week)   Meats and Beans   Lean cuts of fresh or frozen beef, veal, lamb, or pork (look for the word loin) Fresh or frozen poultry without the skin Fresh or frozen fish and some shellfish Egg whites and egg substitutes (Limit whole eggs to three per week) Tofu Nuts or seeds (unsalted, dry-roasted), low-sodium peanut butter Dried peas, beans, and lentils   Any smoked, cured, salted, or canned meat, fish, or poultry (including warren, chipped beef, cold cuts, hot dogs, sausages, sardines, and anchovies) Poultry skins Breaded and/or fried fish or meats Canned peas, beans, and lentils Salted nuts   Fats and Oils   Olive oil and canola oil Low-sodium, low-fat salad dressings and mayonnaise   Butter, margarine, coconut and palm oils, warren fat   Snacks, Sweets, and Condiments   Low-sodium or unsalted versions of broths, soups, soy sauce, and condiments Pepper, herbs, and spices; vinegar, lemon, or lime juice Low-fat frozen desserts (yogurt, sherbet, fruit bars) Sugar, cocoa powder, honey, syrup, jam, and preserves Low-fat, trans-fat free cookies, cakes, and pies Marko and animal crackers, fig bars, matty snaps   High-fat desserts Broth, soups, gravies, and sauces, made from instant mixes or other high-sodium ingredients Salted snack foods Canned olives Meat tenderizers, seasoning salt, and most flavored vinegars   Beverages   Low-sodium carbonated beverages Tea and coffee in moderation Soy milk   Commercially softened water   Suggestions   Make whole grains, fruits, and vegetables the base of your diet. Choose heart-healthy fats such as canola, olive, and flaxseed oil, and foods high in heart-healthy fats, such as nuts, seeds, soybeans, tofu, and fish. Eat fish at least twice per week; the fish highest in omega-3 fatty acids and lowest in mercury include salmon, herring, mackerel, sardines, and canned chunk light tuna. If you eat fish less than twice per week or have high triglycerides, talk to your doctor about taking fish oil supplements. Read food labels.    For products low in fat and cholesterol, look for fat free, low-fat, cholesterol free, saturated fat free, and trans fat freeAlso scan the Nutrition Facts Label, which lists saturated fat, trans fat, and cholesterol amounts. For products low in sodium, look for sodium free, very low sodium, low sodium, no added salt, and unsalted   Skip the salt when cooking or at the table; if food needs more flavor, get creative and try out different herbs and spices. Garlic and onion also add substantial flavor to foods. Trim any visible fat off meat and poultry before cooking, and drain the fat off after hutton. Use cooking methods that require little or no added fat, such as grilling, boiling, baking, poaching, broiling, roasting, steaming, stir-frying, and sauting. Avoid fast food and convenience food. They tend to be high in saturated and trans fat and have a lot of added salt. Talk to a registered dietitian for individualized diet advice. Last Reviewed: March 2011 Anita Lynn MS, MPH, RD   Updated: 3/29/2011   ·     Keep Your Memory Elfrieda Min       Many factors can affect your ability to remembera hectic lifestyle, aging, stress, chronic disease, and certain medicines. But, there are steps you can take to sharpen your mind and help preserve your memory. Challenge Your Brain   Regularly challenging your mind may help keeps it in top shape. Good mental exercises include:   Crossword puzzlesUse a dictionary if you need it; you will learn more that way. Brainteasers Try some! Crafts, such as wood working and sewing   Hobbies, such as gardening and building model airplanes   SocializingVisit old friends or join groups to meet new ones. Reading   Learning a new language   Taking a class, whether it be art history or willard chi   TravelingExperience the food, history, and culture of your destination   Learning to use a computer   Going to museums, the theater, or thought-provoking movies   Changing things in your daily life, such as reversing your pattern in the grocery store or brushing your teeth using your nondominant hand   Use Memory Aids   There is no need to remember every detail on your own.  These memory aids can help:   Calendars and day planners   Electronic organizers to store all sorts of helpful informationThese devices can \"beep\" to remind you of appointments. A book of days to record birthdays, anniversaries, and other occasions that occur on the same date every year   Detailed \"to-do\" lists and strategically placed sticky notes   Quick \"study\" sessionsBefore a gathering, review who will be there so their names will be fresh in your mind. Establish routinesFor example, keep your keys, wallet, and umbrella in the same place all the time or take medicine with your 8:00 AM glass of juice   Live a Healthy Life   Many actions that will keep your body strong will do the same for your mind. For example:   Talk to Your Doctor About Herbs and Supplements    Malnutrition and vitamin deficiencies can impair your mental function. For example, vitamin B12 deficiency can cause a range of symptoms, including confusion. But, what if your nutritional needs are being met? Can herbs and supplements still offer a benefit? Researchers have investigated a range of natural remedies, such as ginkgo , ginseng , and the supplement phosphatidylserine (PS). So far, though, the evidence is inconsistent as to whether these products can improve memory or thinking. If you are interested in taking herbs and supplements, talk to your doctor first because they may interact with other medicines that you are taking. Exercise Regularly    Among the many benefits of regular exercise are increased blood flow to the brain and decreased risk of certain diseases that can interfere with memory function. One study found that even moderate exercise has a beneficial effect. Examples of \"moderate\" exercise include:   Playing 18 holes of golf once a week, without a cart   Playing tennis twice a week   Walking one mile per day   Manage Stress    It can be tough to remember what is important when your mind is cluttered.  Make time for relaxation. Choose activities that calm you down, and make it routine. Manage Chronic Conditions    Side effects of high blood pressure , diabetes, and heart disease can interfere with mental function. Many of the lifestyle steps discussed here can help manage these conditions. Strive to eat a healthy diet, exercise regularly, get stress under control, and follow your doctor's advice for your condition. Minimize Medications    Talk to your doctor about the medicines that you take. Some may be unnecessary. Also, healthy lifestyle habits may lower the need for certain drugs. Last Reviewed: April 2010 Honey Davis MD   Updated: 4/13/2010   ·     [address]       As we get older, changes in balance, gait, strength, vision, hearing, and cognition make even the most youthful senior more prone to accidents. Falls are one of the leading health risks for older people. This increased risk of falling is related to:   Aging process (eg, decreased muscle strength, slowed reflexes)   Higher incidence of chronic health problems (eg, arthritis, diabetes) that may limit mobility, agility or sensory awareness   Side effects of medicine (eg, dizziness, blurred vision)especially medicines like prescription pain medicines and drugs used to treat mental health conditions   Depending on the brittleness of your bones, the consequences of a fall can be serious and long lasting. Home Life   Research by the Association of Aging [facility]) shows that some home accidents among older adults can be prevented by making simple lifestyle changes and basic modifications and repairs to the home environment. Here are some lifestyle changes that experts recommend:   Have your hearing and vision checked regularly. Be sure to wear prescription glasses that are right for you. Speak to your doctor or pharmacist about the possible side effects of your medicines. A number of medicines can cause dizziness.    If you have problems with sleep, talk to your doctor. Limit your intake of alcohol. If necessary, use a cane or walker to help maintain your balance. Wear supportive, rubber-soled shoes, even at home. If you live in a region that gets wintry weather, you may want to put special cleats on your shoes to prevent you from slipping on the snow and ice. Exercise regularly to help maintain muscle tone, agility, and balance. Always hold the banister when going up or down stairs. Also, use  bars when getting in or out of the bath or shower, or using the toilet. To avoid dizziness, get up slowly from a lying down position. Sit up first, dangling your legs for a minute or two before rising to a standing position. Overall Home Safety Check   According to the Consumer Product Safety Commision's \"Older Consumer Home Safety Checklist,\" it is important to check for potential hazards in each room. And remember, proper lighting is an essential factor in home safety. If you cannot see clearly, you are more likely to fall. Important questions to ask yourself include:   Are lamp, electric, extension, and telephone cords placed out of the flow of traffic and maintained in good condition? Have frayed cords been replaced? Are all small rugs and runners slip resistant? If not, you can secure them to the floor with a special double-sided carpet tape. Are smoke detectors properly locatedone on every floor of your home and one outside of every sleeping area? Are they in good working order? Are batteries replaced at least once a year? Do you have a well-maintained carbon monoxide detector outside every sleeping are in your home? Does your furniture layout leave plenty of space to maneuver between and around chairs, tables, beds, and sofas? Are hallways, stairs and passages between rooms well lit? Can you reach a lamp without getting out of bed? Are floor surfaces well maintained?  Shag rugs, high-pile carpeting, tile floors, and polished mobility, bathtub transfer benches allow you to slide safely into the tub. Raised toilet seats and toilet safety rails are helpful for those with knee or hip problems. In the [facility]    Make sure you use a nightlight and that the area around your bed is clear of potential obstacles. Be careful with electric blankets and never go to sleep with a heating pad, which can cause serious burns even if on a low setting. Use fire-resistant mattress covers and pillows, and NEVER smoke in bed. Keep a phone next to the bed that is programmed to dial 911 at the push of a button. If you have a chronic condition, you may want to sign on with an automatic call-in service. Typically the system includes a small pendant that connects directly to an emergency medical voice-response system. You should also make arrangements to stay in contact with someonefriend, neighbor, family memberon a regular schedule. Fire Prevention   According to the [employer]. (Smoke Alarms for Every) [address] [place] [address], senior citizens are one of the two highest risk groups for death and serious injuries due to residential fires. When cooking, wear short-sleeved items, never a bulky long-sleeved robe. The [facility]'s Safety Checklist for Older Consumers emphasizes the importance of checking basements, garages, workshops and storage areas for fire hazards, such as volatile liquids, piles of old rags or clothing and overloaded circuits. Never smoke in bed or when lying down on a couch or recliner chair. Small portable electric or kerosene heaters are responsible for many home fires and should be used cautiously if at all. If you do use one, be sure to keep them away from flammable materials. In case of fire, make sure you have a pre-established emergency exit plan. Have a professional check your fireplace and other fuel-burning appliances yearly.     Helping Hands   Baby boomers entering the rene years will continue to see the development of new products to help older adults live safely and independently in spite of age-related changes. Making Life More Livable  , by Eliane Hickman, lists over 1,000 products for \"living well in the mature years,\" such as bathing and mobility aids, household security devices, ergonomically designed knives and peelers, and faucet valves and knobs for temperature control. Medical supply stores and organizations are good sources of information about products that improve your quality of life and insure your safety.      Last Reviewed: November 2009 Rodolfo Quigley MD   Updated: 3/7/2011     ·

## 2022-02-14 ENCOUNTER — OFFICE VISIT (OUTPATIENT)
Dept: FAMILY MEDICINE CLINIC | Age: 67
End: 2022-02-14
Payer: MEDICARE

## 2022-02-14 VITALS
HEIGHT: 71 IN | TEMPERATURE: 98.1 F | HEART RATE: 84 BPM | WEIGHT: 315 LBS | BODY MASS INDEX: 44.1 KG/M2 | RESPIRATION RATE: 15 BRPM | DIASTOLIC BLOOD PRESSURE: 70 MMHG | OXYGEN SATURATION: 97 % | SYSTOLIC BLOOD PRESSURE: 122 MMHG

## 2022-02-14 DIAGNOSIS — I20.9 ANGINA PECTORIS, UNSPECIFIED (HCC): ICD-10-CM

## 2022-02-14 DIAGNOSIS — G62.9 NEUROPATHY: ICD-10-CM

## 2022-02-14 DIAGNOSIS — I72.9 ANEURYSM (HCC): ICD-10-CM

## 2022-02-14 DIAGNOSIS — N28.9 RENAL NEUROPATHY (HCC): Primary | ICD-10-CM

## 2022-02-14 DIAGNOSIS — Z99.89 OSA ON CPAP: ICD-10-CM

## 2022-02-14 DIAGNOSIS — G40.89 OTHER SEIZURES (HCC): ICD-10-CM

## 2022-02-14 DIAGNOSIS — N25.81 SECONDARY HYPERPARATHYROIDISM OF RENAL ORIGIN (HCC): ICD-10-CM

## 2022-02-14 DIAGNOSIS — K51.418 INFLAMMATORY POLYPS OF COLON WITH OTHER COMPLICATION (HCC): ICD-10-CM

## 2022-02-14 DIAGNOSIS — M48.062 SPINAL STENOSIS OF LUMBAR REGION WITH NEUROGENIC CLAUDICATION: ICD-10-CM

## 2022-02-14 DIAGNOSIS — E66.01 OBESITY, CLASS III, BMI 40-49.9 (MORBID OBESITY) (HCC): ICD-10-CM

## 2022-02-14 DIAGNOSIS — G63 RENAL NEUROPATHY (HCC): Primary | ICD-10-CM

## 2022-02-14 DIAGNOSIS — D68.9 COAGULATION DEFECT, UNSPECIFIED (HCC): ICD-10-CM

## 2022-02-14 DIAGNOSIS — G47.33 OSA ON CPAP: ICD-10-CM

## 2022-02-14 DIAGNOSIS — Z99.2 DIALYSIS PATIENT (HCC): ICD-10-CM

## 2022-02-14 PROCEDURE — 1123F ACP DISCUSS/DSCN MKR DOCD: CPT | Performed by: FAMILY MEDICINE

## 2022-02-14 PROCEDURE — G8484 FLU IMMUNIZE NO ADMIN: HCPCS | Performed by: FAMILY MEDICINE

## 2022-02-14 PROCEDURE — G8427 DOCREV CUR MEDS BY ELIG CLIN: HCPCS | Performed by: FAMILY MEDICINE

## 2022-02-14 PROCEDURE — 4040F PNEUMOC VAC/ADMIN/RCVD: CPT | Performed by: FAMILY MEDICINE

## 2022-02-14 PROCEDURE — 99213 OFFICE O/P EST LOW 20 MIN: CPT | Performed by: FAMILY MEDICINE

## 2022-02-14 PROCEDURE — 3017F COLORECTAL CA SCREEN DOC REV: CPT | Performed by: FAMILY MEDICINE

## 2022-02-14 PROCEDURE — G8417 CALC BMI ABV UP PARAM F/U: HCPCS | Performed by: FAMILY MEDICINE

## 2022-02-14 PROCEDURE — 1036F TOBACCO NON-USER: CPT | Performed by: FAMILY MEDICINE

## 2022-02-14 RX ORDER — NEBULIZER ACCESSORIES
KIT MISCELLANEOUS
Qty: 1 KIT | Refills: 0 | Status: SHIPPED | OUTPATIENT
Start: 2022-02-14

## 2022-02-14 RX ORDER — GABAPENTIN 300 MG/1
CAPSULE ORAL
Qty: 100 CAPSULE | Refills: 1 | Status: SHIPPED | OUTPATIENT
Start: 2022-02-14 | End: 2022-05-23

## 2022-02-14 SDOH — ECONOMIC STABILITY: FOOD INSECURITY: WITHIN THE PAST 12 MONTHS, YOU WORRIED THAT YOUR FOOD WOULD RUN OUT BEFORE YOU GOT MONEY TO BUY MORE.: NEVER TRUE

## 2022-02-14 SDOH — ECONOMIC STABILITY: FOOD INSECURITY: WITHIN THE PAST 12 MONTHS, THE FOOD YOU BOUGHT JUST DIDN'T LAST AND YOU DIDN'T HAVE MONEY TO GET MORE.: NEVER TRUE

## 2022-02-14 ASSESSMENT — PATIENT HEALTH QUESTIONNAIRE - PHQ9
2. FEELING DOWN, DEPRESSED OR HOPELESS: 0
SUM OF ALL RESPONSES TO PHQ QUESTIONS 1-9: 0
1. LITTLE INTEREST OR PLEASURE IN DOING THINGS: 0
SUM OF ALL RESPONSES TO PHQ9 QUESTIONS 1 & 2: 0
SUM OF ALL RESPONSES TO PHQ QUESTIONS 1-9: 0

## 2022-02-14 ASSESSMENT — ENCOUNTER SYMPTOMS
SHORTNESS OF BREATH: 0
RESPIRATORY NEGATIVE: 1
GASTROINTESTINAL NEGATIVE: 1
EYES NEGATIVE: 1
ALLERGIC/IMMUNOLOGIC NEGATIVE: 1

## 2022-02-14 ASSESSMENT — SOCIAL DETERMINANTS OF HEALTH (SDOH): HOW HARD IS IT FOR YOU TO PAY FOR THE VERY BASICS LIKE FOOD, HOUSING, MEDICAL CARE, AND HEATING?: NOT VERY HARD

## 2022-02-14 NOTE — PROGRESS NOTES
Patient is seen in follow up for   Chief Complaint   Patient presents with    6 Month Follow-Up    Sleep Apnea     needs new nebulizer supplies needs to go to [address] and office notes need to be specific for use     He has back pain radiating to legs with walking getting progressively worse.     Past Medical History:   Diagnosis Date    Chronic kidney disease     Hemodialysis patient ([facility] 05/2008    MWF    Hyperlipidemia     Hypertension     Renal failure     Sleep apnea      Patient Active Problem List    Diagnosis Date Noted    Renal neuropathy ([facility] 02/14/2022    Tremor     Unspecified convulsions 10/15/2021    Other seizures 10/15/2021    Aneurysm of arteriovenous fistula ([facility] 08/24/2021    Edema of lower extremity 08/24/2021    First degree atrioventricular block 08/24/2021    Sinus bradycardia 08/24/2021    Ventricular premature beats 08/24/2021    Hypercalcemia 07/19/2021    Allergy, unspecified, initial encounter 06/03/2021    Anaphylactic shock, unspecified, initial encounter 06/03/2021    Diarrhea, unspecified 06/03/2021    Nausea 06/03/2021    Inflammatory polyps of colon with other complication ([facility] 96/64/5799    Morbidly obese ([facility] 06/02/2020    Adenomatous polyp of descending colon     JENNIFER on CPAP 01/20/2020    Polycystic kidney disease 01/20/2020    Essential hypertension 01/20/2020    CRI (chronic renal insufficiency), stage 5 ([facility] 01/20/2020    Dialysis patient ([facility] 01/20/2020    Edema, unspecified 01/03/2020    Localized edema 10/26/2019    Other forms of dyspnea 10/26/2019    Hypotension of hemodialysis 08/21/2019    Other fatigue 08/21/2019    BPH without obstruction/lower urinary tract symptoms 05/09/2019    Chronic midline low back pain without sciatica 05/09/2019    Fluid overload, unspecified 07/09/2018    Extrarenal uremia 06/15/2018    Angina pectoris, unspecified ([facility] 01/12/2016    Cramp and spasm 01/12/2016    Fever, unspecified 01/12/2016  Headache, unspecified 01/12/2016    Hypoglycemia, unspecified 01/12/2016    Bradycardia, unspecified 11/18/2015    IFG (impaired fasting glucose) 11/12/2015    Neuropathy 11/12/2015    Nonspecific abnormal electrocardiogram (ECG) (EKG) 11/12/2015    Other specified symptoms and signs involving the circulatory and respiratory systems 09/21/2015    Coagulation defect, unspecified ([facility] 07/31/2015    Encounter for immunization 10/02/2013    Disorder of phosphorus metabolism, unspecified 04/04/2011    Secondary hyperparathyroidism of renal origin ([facility] 02/12/2009    Iron deficiency anemia, unspecified 12/22/2008    Anemia in chronic kidney disease 11/22/2008    Pain, unspecified 11/22/2008    Other disorders resulting from impaired renal tubular function 06/30/2008    Polycystic kidney, adult type 05/30/2008    End stage kidney disease ([facility] 05/01/2007     Past Surgical History:   Procedure Laterality Date    COLONOSCOPY      Polyps 2010, none in 2015    COLONOSCOPY N/A 2/6/2020    COLORECTAL CANCER SCREENING, HIGH RISK performed by Lamont De La Paz MD at [address] Left 2008    TONSILLECTOMY AND ADENOIDECTOMY      UVULECTOMY       Family History   Problem Relation Age of Onset    Kidney Disease Mother     Cancer Father     Heart Attack Father      Social History     Socioeconomic History    Marital status:      Spouse name: None    Number of children: None    Years of education: None    Highest education level: None   Occupational History    None   Tobacco Use    Smoking status: Never Smoker    Smokeless tobacco: Never Used   Vaping Use    Vaping Use: Never used   Substance and Sexual Activity    Alcohol use: Not Currently    Drug use: Not Currently    Sexual activity: Yes   Other Topics Concern    None   Social History Narrative    None     Social Determinants of Health     Financial Resource Strain: Low Risk     Difficulty of Paying Living Expenses: Not very hard   Food Insecurity: No Food Insecurity    Worried About Running Out of Food in the Last Year: Never true    Ran Out of Food in the Last Year: Never true   Transportation Needs:     Lack of Transportation (Medical): Not on file    Lack of Transportation (Non-Medical): Not on file   Physical Activity:     Days of Exercise per Week: Not on file    Minutes of Exercise per Session: Not on file   Stress:     Feeling of Stress : Not on file   Social Connections:     Frequency of Communication with Friends and Family: Not on file    Frequency of Social Gatherings with Friends and Family: Not on file    Attends Anabaptism Services: Not on file    Active Member of [address] [place] [address] [employer] or Organizations: Not on file    Attends Club or Organization Meetings: Not on file    Marital Status: Not on file   Intimate Partner Violence:     Fear of Current or Ex-Partner: Not on file    Emotionally Abused: Not on file    Physically Abused: Not on file    Sexually Abused: Not on file   Housing Stability:     Unable to Pay for Housing in the Last Year: Not on file    Number of [place] in the Last Year: Not on file    Unstable Housing in the Last Year: Not on file     Current Outpatient Medications   Medication Sig Dispense Refill    gabapentin (NEURONTIN) 300 MG capsule TK ONE C PO HS AFTER DIALYSIS 5 DAYS A WEEK 100 capsule 1    Respiratory Therapy Supplies (NEBULIZER/TUBING/MOUTHPIECE) KIT Uses nightly 1 kit 0    CPAP Machine MISC by Does not apply route Disp tubing and mask and head gear every three months.  1 each 0    levETIRAcetam (KEPPRA) 250 MG tablet Take 1.5 tablets by mouth 2 times daily 270 tablet 3    Calcium Acetate, Phos Binder, 667 MG CAPS Take 2 capsules by mouth 3 times daily       celecoxib (CELEBREX) 200 MG capsule Take 1 capsule by mouth daily 90 capsule 3    B Complex-C-Folic Acid (KAYE-LEILANI) TABS TAKE 1 TABLET BY MOUTH EVERY DAY      VASCEPA 1 g CAPS capsule TK 2 CS PO BID      torsemide (DEMADEX) 100 MG tablet Take 100 mg by mouth      rosuvastatin (CRESTOR) 10 MG tablet Take 10 mg by mouth      cinacalcet (SENSIPAR) 30 MG tablet TAKE 1 TABLET BY MOUTH ONCE A DAY AS DIRECTED WITH THE EVENING MEAL      aspirin (ECOTRIN LOW STRENGTH) 81 MG EC tablet Take by mouth       No current facility-administered medications for this visit. Current Outpatient Medications on File Prior to Visit   Medication Sig Dispense Refill    levETIRAcetam (KEPPRA) 250 MG tablet Take 1.5 tablets by mouth 2 times daily 270 tablet 3    Calcium Acetate, Phos Binder, 667 MG CAPS Take 2 capsules by mouth 3 times daily       celecoxib (CELEBREX) 200 MG capsule Take 1 capsule by mouth daily 90 capsule 3    B Complex-C-Folic Acid (KAYE-LEILANI) TABS TAKE 1 TABLET BY MOUTH EVERY DAY      VASCEPA 1 g CAPS capsule TK 2 CS PO BID      torsemide (DEMADEX) 100 MG tablet Take 100 mg by mouth      rosuvastatin (CRESTOR) 10 MG tablet Take 10 mg by mouth      cinacalcet (SENSIPAR) 30 MG tablet TAKE 1 TABLET BY MOUTH ONCE A DAY AS DIRECTED WITH THE EVENING MEAL      aspirin (ECOTRIN LOW STRENGTH) 81 MG EC tablet Take by mouth       No current facility-administered medications on file prior to visit.      No Known Allergies  Health Maintenance   Topic Date Due    Hepatitis B vaccine (1 of 3 - Risk Recombivax 3-dose series) [birth year]/1973    DTaP/Tdap/Td vaccine (1 - Tdap) Never done    Shingles Vaccine (1 of 2) Never done    Lipid screen  02/06/2021    A1C test (Diabetic or Prediabetic)  11/27/2021    Potassium monitoring  08/27/2022    Creatinine monitoring  08/27/2022    Depression Screen  12/20/2022    Annual Wellness Visit (AWV)  12/21/2022    Pneumococcal 65+ yrs at Risk Vaccine (2 of 2 - PPSV23) 09/17/2023    Colon cancer screen colonoscopy  02/06/2025    Flu vaccine  Completed    COVID-19 Vaccine  Completed    Hepatitis C screen  Completed    Hepatitis A vaccine  Aged Out    Hib vaccine Neurological:      Mental Status: He is alert and oriented to person, place, and time. Cranial Nerves: No cranial nerve deficit. Coordination: Coordination normal.         Assessment   Diagnosis Orders   1. Renal neuropathy ([facility]     2. Neuropathy  gabapentin (NEURONTIN) 300 MG capsule   3. Dialysis patient ([address]     4. Inflammatory polyps of colon with other complication ([facility]     5. Aneurysm ([facility]     6. Other seizures ([facility]     7. Secondary hyperparathyroidism of renal origin ([facility]     8. Coagulation defect, unspecified ([facility]     9. Angina pectoris, unspecified ([facility]     10. JENNIFER on CPAP     11. Obesity, Class III, BMI 40-49.9 (morbid obesity) ([facility]       Problem List     JENNIFER on CPAP    Dialysis patient ([facility]    Inflammatory polyps of colon with other complication ([facility]    Angina pectoris, unspecified ([facility]    Coagulation defect, unspecified ([facility]    Neuropathy    Relevant Medications    gabapentin (NEURONTIN) 300 MG capsule    Secondary hyperparathyroidism of renal origin ([address]    Other seizures    Relevant Medications    levETIRAcetam (KEPPRA) 250 MG tablet    gabapentin (NEURONTIN) 300 MG capsule    Renal neuropathy (HCC) - Primary    Relevant Medications    levETIRAcetam (KEPPRA) 250 MG tablet    gabapentin (NEURONTIN) 300 MG capsule          Plan  Patient continues to use and benefit from cpap. Orders Placed This Encounter   Medications    gabapentin (NEURONTIN) 300 MG capsule     Sig: TK ONE C PO HS AFTER DIALYSIS 5 DAYS A WEEK     Dispense:  100 capsule     Refill:  1    Respiratory Therapy Supplies (NEBULIZER/TUBING/MOUTHPIECE) KIT     Sig: Uses nightly     Dispense:  1 kit     Refill:  0     Dx sleep apnea.  CPAP Machine MISC     Sig: by Does not apply route Disp tubing and mask and head gear every three months. Dispense:  1 each     Refill:  0     No follow-ups on file.   Luisa Kilgore MD

## 2022-02-23 ENCOUNTER — HOSPITAL ENCOUNTER (OUTPATIENT)
Dept: MRI IMAGING | Age: 67
Discharge: HOME | End: 2022-02-25
Payer: MEDICARE

## 2022-02-23 DIAGNOSIS — M48.062 SPINAL STENOSIS OF LUMBAR REGION WITH NEUROGENIC CLAUDICATION: ICD-10-CM

## 2022-02-23 PROCEDURE — 72148 MRI LUMBAR SPINE W/O DYE: CPT

## 2022-03-02 ENCOUNTER — TELEPHONE (OUTPATIENT)
Dept: FAMILY MEDICINE CLINIC | Age: 67
End: 2022-03-02

## 2022-03-02 DIAGNOSIS — G47.30 SLEEP APNEA, UNSPECIFIED TYPE: Primary | ICD-10-CM

## 2022-03-02 NOTE — TELEPHONE ENCOUNTER
Patient is calling to follow up from his recent [address] Patient states that he needs the following C-Pap supplies:  Cap, Hose, Face Mask, and Filter. Prescription requests should be sent to [facility] [phone]    He also states that he does not need a Nebulizer.

## 2022-03-04 NOTE — TELEPHONE ENCOUNTER
LM with wife to call me back regarding when his last sleep study was.  Wife will call me back with that information

## 2022-03-07 NOTE — TELEPHONE ENCOUNTER
Talked to Miko Wesley and his last sleep study was over 20 year's ago he will need a new baseline done.

## 2022-03-18 ENCOUNTER — OFFICE VISIT (OUTPATIENT)
Dept: FAMILY MEDICINE CLINIC | Age: 67
End: 2022-03-18
Payer: MEDICARE

## 2022-03-18 VITALS
WEIGHT: 315 LBS | SYSTOLIC BLOOD PRESSURE: 126 MMHG | BODY MASS INDEX: 44.1 KG/M2 | DIASTOLIC BLOOD PRESSURE: 72 MMHG | RESPIRATION RATE: 15 BRPM | TEMPERATURE: 97.3 F | HEART RATE: 88 BPM | HEIGHT: 71 IN | OXYGEN SATURATION: 96 %

## 2022-03-18 DIAGNOSIS — M48.062 SPINAL STENOSIS OF LUMBAR REGION WITH NEUROGENIC CLAUDICATION: Primary | ICD-10-CM

## 2022-03-18 PROCEDURE — G8427 DOCREV CUR MEDS BY ELIG CLIN: HCPCS | Performed by: FAMILY MEDICINE

## 2022-03-18 PROCEDURE — 1036F TOBACCO NON-USER: CPT | Performed by: FAMILY MEDICINE

## 2022-03-18 PROCEDURE — 4040F PNEUMOC VAC/ADMIN/RCVD: CPT | Performed by: FAMILY MEDICINE

## 2022-03-18 PROCEDURE — 3017F COLORECTAL CA SCREEN DOC REV: CPT | Performed by: FAMILY MEDICINE

## 2022-03-18 PROCEDURE — G8484 FLU IMMUNIZE NO ADMIN: HCPCS | Performed by: FAMILY MEDICINE

## 2022-03-18 PROCEDURE — 99213 OFFICE O/P EST LOW 20 MIN: CPT | Performed by: FAMILY MEDICINE

## 2022-03-18 PROCEDURE — G8417 CALC BMI ABV UP PARAM F/U: HCPCS | Performed by: FAMILY MEDICINE

## 2022-03-18 PROCEDURE — 1123F ACP DISCUSS/DSCN MKR DOCD: CPT | Performed by: FAMILY MEDICINE

## 2022-03-18 ASSESSMENT — ENCOUNTER SYMPTOMS
RESPIRATORY NEGATIVE: 1
GASTROINTESTINAL NEGATIVE: 1
BACK PAIN: 1
ALLERGIC/IMMUNOLOGIC NEGATIVE: 1
EYES NEGATIVE: 1
SHORTNESS OF BREATH: 0

## 2022-03-18 NOTE — PROGRESS NOTES
Patient is seen in follow up for   Chief Complaint   Patient presents with    Results     MRI     HPIhere for follow up on spinal stenosis    Past Medical History:   Diagnosis Date    Chronic kidney disease     Hemodialysis patient ([facility] 05/2008    MWF    Hyperlipidemia     Hypertension     Renal failure     Sleep apnea      Patient Active Problem List    Diagnosis Date Noted    Renal neuropathy ([address] 02/14/2022    Tremor     Unspecified convulsions 10/15/2021    Other seizures 10/15/2021    Aneurysm of arteriovenous fistula ([address] 08/24/2021    Edema of lower extremity 08/24/2021    First degree atrioventricular block 08/24/2021    Sinus bradycardia 08/24/2021    Ventricular premature beats 08/24/2021    Hypercalcemia 07/19/2021    Allergy, unspecified, initial encounter 06/03/2021    Anaphylactic shock, unspecified, initial encounter 06/03/2021    Diarrhea, unspecified 06/03/2021    Nausea 06/03/2021    Inflammatory polyps of colon with other complication ([address] 05/96/5082    Morbidly obese ([facility] 06/02/2020    Adenomatous polyp of descending colon     JENNIFER on CPAP 01/20/2020    Polycystic kidney disease 01/20/2020    Essential hypertension 01/20/2020    CRI (chronic renal insufficiency), stage 5 ([address] 01/20/2020    Dialysis patient ([facility] 01/20/2020    Edema, unspecified 01/03/2020    Localized edema 10/26/2019    Other forms of dyspnea 10/26/2019    Hypotension of hemodialysis 08/21/2019    Other fatigue 08/21/2019    BPH without obstruction/lower urinary tract symptoms 05/09/2019    Chronic midline low back pain without sciatica 05/09/2019    Fluid overload, unspecified 07/09/2018    Extrarenal uremia 06/15/2018    Angina pectoris, unspecified ([address] 01/12/2016    Cramp and spasm 01/12/2016    Fever, unspecified 01/12/2016    Headache, unspecified 01/12/2016    Hypoglycemia, unspecified 01/12/2016    Bradycardia, unspecified 11/18/2015    IFG (impaired fasting glucose) 11/12/2015    Neuropathy 11/12/2015    Nonspecific abnormal electrocardiogram (ECG) (EKG) 11/12/2015    Other specified symptoms and signs involving the circulatory and respiratory systems 09/21/2015    Coagulation defect, unspecified ([facility] 07/31/2015    Encounter for immunization 10/02/2013    Disorder of phosphorus metabolism, unspecified 04/04/2011    Secondary hyperparathyroidism of renal origin ([facility] 02/12/2009    Iron deficiency anemia, unspecified 12/22/2008    Anemia in chronic kidney disease 11/22/2008    Pain, unspecified 11/22/2008    Other disorders resulting from impaired renal tubular function 06/30/2008    Polycystic kidney, adult type 05/30/2008    End stage kidney disease ([facility] 05/01/2007     Past Surgical History:   Procedure Laterality Date    COLONOSCOPY      Polyps 2010, none in 2015    COLONOSCOPY N/A 2/6/2020    COLORECTAL CANCER SCREENING, HIGH RISK performed by Lamont De La Paz MD at [address] Left 2008    TONSILLECTOMY AND ADENOIDECTOMY      UVULECTOMY       Family History   Problem Relation Age of Onset    Kidney Disease Mother     Cancer Father     Heart Attack Father      Social History     Socioeconomic History    Marital status:      Spouse name: Not on file    Number of children: Not on file    Years of education: Not on file    Highest education level: Not on file   Occupational History    Not on file   Tobacco Use    Smoking status: Never Smoker    Smokeless tobacco: Never Used   Vaping Use    Vaping Use: Never used   Substance and Sexual Activity    Alcohol use: Not Currently    Drug use: Not Currently    Sexual activity: Yes   Other Topics Concern    Not on file   Social History Narrative    Not on file     Social Determinants of Health     Financial Resource Strain: Low Risk     Difficulty of Paying Living Expenses: Not very hard   Food Insecurity: No Food Insecurity    Worried About Running Out of Food in the Last Year: Never true    [address] in the Last Year: Never true   Transportation Needs:     Lack of Transportation (Medical): Not on file    Lack of Transportation (Non-Medical): Not on file   Physical Activity:     Days of Exercise per Week: Not on file    Minutes of Exercise per Session: Not on file   Stress:     Feeling of Stress : Not on file   Social Connections:     Frequency of Communication with Friends and Family: Not on file    Frequency of Social Gatherings with Friends and Family: Not on file    Attends Anabaptism Services: Not on file    Active Member of [address] [place] [address] or Organizations: Not on file    Attends Club or Organization Meetings: Not on file    Marital Status: Not on file   Intimate Partner Violence:     Fear of Current or Ex-Partner: Not on file    Emotionally Abused: Not on file    Physically Abused: Not on file    Sexually Abused: Not on file   Housing Stability:     Unable to Pay for Housing in the Last Year: Not on file    Number of [place] in the Last Year: Not on file    Unstable Housing in the Last Year: Not on file     Current Outpatient Medications   Medication Sig Dispense Refill    CPAP Machine MISC by Does not apply route Disp Cap, Hose,Face Mask and filter every three months 1 each 3    gabapentin (NEURONTIN) 300 MG capsule TK ONE C PO HS AFTER DIALYSIS 5 DAYS A WEEK 100 capsule 1    Respiratory Therapy Supplies (NEBULIZER/TUBING/MOUTHPIECE) KIT Uses nightly 1 kit 0    CPAP Machine MISC by Does not apply route Disp tubing and mask and head gear every three months.  1 each 0    levETIRAcetam (KEPPRA) 250 MG tablet Take 1.5 tablets by mouth 2 times daily 270 tablet 3    Calcium Acetate, Phos Binder, 667 MG CAPS Take 2 capsules by mouth 3 times daily       celecoxib (CELEBREX) 200 MG capsule Take 1 capsule by mouth daily 90 capsule 3    B Complex-C-Folic Acid (KAYE-LEILANI) TABS TAKE 1 TABLET BY MOUTH EVERY DAY      VASCEPA 1 g CAPS capsule TK 2 CS PO BID      torsemide (DEMADEX) 100 MG tablet Take 100 mg by mouth      rosuvastatin (CRESTOR) 10 MG tablet Take 10 mg by mouth      cinacalcet (SENSIPAR) 30 MG tablet TAKE 1 TABLET BY MOUTH ONCE A DAY AS DIRECTED WITH THE EVENING MEAL      aspirin (ECOTRIN LOW STRENGTH) 81 MG EC tablet Take by mouth       No current facility-administered medications for this visit. Current Outpatient Medications on File Prior to Visit   Medication Sig Dispense Refill    CPAP Machine MISC by Does not apply route Disp Cap, Hose,Face Mask and filter every three months 1 each 3    gabapentin (NEURONTIN) 300 MG capsule TK ONE C PO HS AFTER DIALYSIS 5 DAYS A WEEK 100 capsule 1    Respiratory Therapy Supplies (NEBULIZER/TUBING/MOUTHPIECE) KIT Uses nightly 1 kit 0    CPAP Machine MISC by Does not apply route Disp tubing and mask and head gear every three months. 1 each 0    levETIRAcetam (KEPPRA) 250 MG tablet Take 1.5 tablets by mouth 2 times daily 270 tablet 3    Calcium Acetate, Phos Binder, 667 MG CAPS Take 2 capsules by mouth 3 times daily       celecoxib (CELEBREX) 200 MG capsule Take 1 capsule by mouth daily 90 capsule 3    B Complex-C-Folic Acid (KAYE-LEILANI) TABS TAKE 1 TABLET BY MOUTH EVERY DAY      VASCEPA 1 g CAPS capsule TK 2 CS PO BID      torsemide (DEMADEX) 100 MG tablet Take 100 mg by mouth      rosuvastatin (CRESTOR) 10 MG tablet Take 10 mg by mouth      cinacalcet (SENSIPAR) 30 MG tablet TAKE 1 TABLET BY MOUTH ONCE A DAY AS DIRECTED WITH THE EVENING MEAL      aspirin (ECOTRIN LOW STRENGTH) 81 MG EC tablet Take by mouth       No current facility-administered medications on file prior to visit.      No Known Allergies  Health Maintenance   Topic Date Due    Hepatitis B vaccine (1 of 3 - Risk Recombivax 3-dose series) 08/16/1973    DTaP/Tdap/Td vaccine (1 - Tdap) Never done    Shingles Vaccine (1 of 2) Never done    Lipid screen  02/06/2021    A1C test (Diabetic or Prediabetic)  11/27/2021    Potassium monitoring  08/27/2022    Creatinine monitoring  08/27/2022    Annual Wellness Visit (AWV)  12/21/2022    Depression Screen  02/14/2023    Pneumococcal 65+ yrs at Risk Vaccine (2 of 2 - PPSV23) 09/17/2023    Colorectal Cancer Screen  02/06/2025    Flu vaccine  Completed    COVID-19 Vaccine  Completed    Hepatitis C screen  Completed    Hepatitis A vaccine  Aged Out    Hib vaccine  Aged Out    Meningococcal (ACWY) vaccine  Aged Out       Review of Systems     Review of Systems   Constitutional: Negative for activity change, appetite change, chills, fever and unexpected weight change. HENT: Negative. Eyes: Negative. Respiratory: Negative. Negative for shortness of breath. Cardiovascular: Negative. Negative for chest pain and palpitations. Gastrointestinal: Negative. Endocrine: Negative. Genitourinary: Negative. Musculoskeletal: Positive for arthralgias and back pain. Skin: Negative. Allergic/Immunologic: Negative. Neurological: Negative. Hematological: Negative. Psychiatric/Behavioral: Negative. Physical Exam  Vitals:    03/18/22 1006   BP: 126/72   Pulse: 88   Resp: 15   Temp: 97.3 °F (36.3 °C)   SpO2: 96%   Weight: (!) 322 lb (146.1 kg)   Height: 5' 11\" (1.803 m)       Physical Exam  Constitutional:       Appearance: He is well-developed. HENT:      Right Ear: External ear normal.      Left Ear: External ear normal.   Eyes:      Conjunctiva/sclera: Conjunctivae normal.      Pupils: Pupils are equal, round, and reactive to light. Neck:      Thyroid: No thyromegaly. Cardiovascular:      Rate and Rhythm: Normal rate and regular rhythm. Heart sounds: Normal heart sounds. No murmur heard. No friction rub. No gallop. Pulmonary:      Effort: Pulmonary effort is normal. No respiratory distress. Breath sounds: Normal breath sounds. No wheezing. Abdominal:      General: Bowel sounds are normal. There is no distension.       Palpations: Abdomen is soft. There is no mass. Tenderness: There is no abdominal tenderness. There is no guarding or rebound. Hernia: No hernia is present. Genitourinary:     Penis: Normal.    Musculoskeletal:         General: No tenderness. Normal range of motion. Cervical back: Normal range of motion and neck supple. Lymphadenopathy:      Cervical: No cervical adenopathy. Skin:     General: Skin is warm and dry. Neurological:      Mental Status: He is alert and oriented to person, place, and time. Cranial Nerves: No cranial nerve deficit. Coordination: Coordination normal.         Assessment   Diagnosis Orders   1. Spinal stenosis of lumbar region with neurogenic claudication  Ambulatory referral to Neurosurgery     Problem List     None          Plan  Orders Placed This Encounter   Procedures    Ambulatory referral to Neurosurgery     Referral Priority:   Routine     Referral Type:   Eval and Treat     Referral Reason:   Specialty Services Required     Referred to Provider:   Jostin Ricketts MD     Number of Visits Requested:   1     No orders of the defined types were placed in this encounter. No follow-ups on file.   Gordon Winters MD

## 2022-03-31 ENCOUNTER — HOSPITAL ENCOUNTER (OUTPATIENT)
Dept: SLEEP CENTER | Age: 67
Discharge: HOME | End: 2022-04-02
Payer: MEDICARE

## 2022-03-31 PROCEDURE — 95810 POLYSOM 6/> YRS 4/> PARAM: CPT

## 2022-04-01 PROCEDURE — 95810 POLYSOM 6/> YRS 4/> PARAM: CPT | Performed by: INTERNAL MEDICINE

## 2022-04-06 DIAGNOSIS — G47.33 OSA (OBSTRUCTIVE SLEEP APNEA): Primary | ICD-10-CM

## 2022-04-11 ENCOUNTER — OFFICE VISIT (OUTPATIENT)
Dept: PULMONOLOGY | Age: 67
End: 2022-04-11
Payer: MEDICARE

## 2022-04-11 VITALS
HEART RATE: 87 BPM | TEMPERATURE: 97.6 F | DIASTOLIC BLOOD PRESSURE: 72 MMHG | WEIGHT: 315 LBS | SYSTOLIC BLOOD PRESSURE: 122 MMHG | HEIGHT: 71 IN | OXYGEN SATURATION: 95 % | BODY MASS INDEX: 44.1 KG/M2

## 2022-04-11 DIAGNOSIS — E66.9 OBESITY, UNSPECIFIED CLASSIFICATION, UNSPECIFIED OBESITY TYPE, UNSPECIFIED WHETHER SERIOUS COMORBIDITY PRESENT: ICD-10-CM

## 2022-04-11 DIAGNOSIS — G47.34 SLEEP RELATED HYPOXIA: ICD-10-CM

## 2022-04-11 DIAGNOSIS — G47.10 HYPERSOMNIA: ICD-10-CM

## 2022-04-11 DIAGNOSIS — G47.33 OSA (OBSTRUCTIVE SLEEP APNEA): Primary | ICD-10-CM

## 2022-04-11 PROCEDURE — 1036F TOBACCO NON-USER: CPT | Performed by: INTERNAL MEDICINE

## 2022-04-11 PROCEDURE — G8417 CALC BMI ABV UP PARAM F/U: HCPCS | Performed by: INTERNAL MEDICINE

## 2022-04-11 PROCEDURE — G8427 DOCREV CUR MEDS BY ELIG CLIN: HCPCS | Performed by: INTERNAL MEDICINE

## 2022-04-11 PROCEDURE — 3017F COLORECTAL CA SCREEN DOC REV: CPT | Performed by: INTERNAL MEDICINE

## 2022-04-11 PROCEDURE — 4040F PNEUMOC VAC/ADMIN/RCVD: CPT | Performed by: INTERNAL MEDICINE

## 2022-04-11 PROCEDURE — 99203 OFFICE O/P NEW LOW 30 MIN: CPT | Performed by: INTERNAL MEDICINE

## 2022-04-11 PROCEDURE — 1123F ACP DISCUSS/DSCN MKR DOCD: CPT | Performed by: INTERNAL MEDICINE

## 2022-04-11 NOTE — PROGRESS NOTES
NEW PATIENT VISIT-PULMONARY/SLEEP    4/11/2022     REFERRING PHYSICIAN:  Ebony Escalante MD     REASON FOR REFERRAL:  JENNIFER    HPI:     Melissa Booth is a 78 y.o. male who was referred to pulmonary clinic for evaluation. He underwent recent sleep study which showed severe sleep apnea. Overall AHI was 41. He had significant sleep related desaturations. He had desaturations below 89% for more than 200 minutes. He is known to have obstructive sleep apnea for more than 20 years. He has been on CPAP machine at a pressure of 10 cm H2O. His CPAP is old more than 8 years. He has not been working like it was before. He continues to feel tired and sleepy during the day and sometimes he chokes while he was sleeping. He continues to be compliant with the machine. He has not had supplies or mask for quite some time. He has shortness of breath with minimal activities. He denies cough. He is a lifelong non-smoker. His weight has been increasing. Of note, he had UPPP surgery for JENNIFER more than 15 years ago.                    Past Medical History   Past Medical History:   Diagnosis Date    Chronic kidney disease     Hemodialysis patient ([facility] [address] 05/2008    MWF    Hyperlipidemia     Hypertension     Renal failure     Sleep apnea        Past Surgical History  Past Surgical History:   Procedure Laterality Date    COLONOSCOPY      Polyps 2010, none in 2015    COLONOSCOPY N/A 2/6/2020    COLORECTAL CANCER SCREENING, HIGH RISK performed by Juno Coronel MD at [address] Left 2008    TONSILLECTOMY AND ADENOIDECTOMY      UVULECTOMY         Allergies  No Known Allergies    Medications  Current Outpatient Medications   Medication Sig Dispense Refill    CPAP Machine MISC by Does not apply route Disp Cap, Hose,Face Mask and filter every three months 1 each 3    gabapentin (NEURONTIN) 300 MG capsule TK ONE C PO HS AFTER DIALYSIS 5 DAYS A WEEK 100 capsule 1    Respiratory Therapy Supplies (NEBULIZER/TUBING/MOUTHPIECE) KIT Uses nightly 1 kit 0    CPAP Machine MISC by Does not apply route Disp tubing and mask and head gear every three months. 1 each 0    Calcium Acetate, Phos Binder, 667 MG CAPS Take 2 capsules by mouth 3 times daily       celecoxib (CELEBREX) 200 MG capsule Take 1 capsule by mouth daily 90 capsule 3    B Complex-C-Folic Acid (KAYE-LEILANI) TABS TAKE 1 TABLET BY MOUTH EVERY DAY      VASCEPA 1 g CAPS capsule TK 2 CS PO BID      torsemide (DEMADEX) 100 MG tablet Take 100 mg by mouth      rosuvastatin (CRESTOR) 10 MG tablet Take 10 mg by mouth      cinacalcet (SENSIPAR) 30 MG tablet TAKE 1 TABLET BY MOUTH ONCE A DAY AS DIRECTED WITH THE EVENING MEAL      aspirin (ECOTRIN LOW STRENGTH) 81 MG EC tablet Take by mouth      levETIRAcetam (KEPPRA) 250 MG tablet Take 1.5 tablets by mouth 2 times daily 270 tablet 3     No current facility-administered medications for this visit. Social History  Social History     Tobacco Use    Smoking status: Never Smoker    Smokeless tobacco: Never Used   Substance Use Topics    Alcohol use: Not Currently       Family History  Family History   Problem Relation Age of Onset    Kidney Disease Mother     Cancer Father     Heart Attack Father        Review of Systems  All review of systems has been obtained and negative other than what was mentionedin HPI. Physical Exam                 Vitals:    04/11/22 1130   BP: 122/72   Pulse: 87   Temp: 97.6 °F (36.4 °C)   TempSrc: Tympanic   SpO2: 95%   Weight: (!) 325 lb (147.4 kg)   Height: 5' 11\" (1.803 m)          General appearance: Well appearing. No acute distress. AAOX3  Head: Normocephalic, without obvious abnormality, atraumatic   Eyes:Pupils bilateral equal and reactive, EOM intact. Normal sclera and conjunctiva   Nose: Mucosa pink  Throat: Clear,  [place] 3  Neck: Supple, No JVD. Nothyromegaly. Neck is thick  Lungs: Clear bilaterally. No wheezing. No crackles. No use of accessory muscles. Heart: RRR, S1, S2 normal, no murmur, click, rub or gallop   Abdomen: soft, non-tender, nondistended. Bowel sounds normal. No hernia. No organomegaly. Extremities: extremities normal, atraumatic, no cyanosis, no edema  Skin: Skin color, texture, turgor normal. No rashes or lesions   Neurological: No focal deficits,cranial nerves grossly intact. No weakness. Sensation normal   Psych: Normal Mood  Musculoskeletal: No joint abnormalities. Impression:   Diagnosis Orders   1. JENNIFER (obstructive sleep apnea)  Sleep Study with PAP Titration   2. Sleep related hypoxia     3. Hypersomnia     4. Obesity, unspecified classification, unspecified obesity type, unspecified whether serious comorbidity present        Orders Placed This Encounter   Procedures    Sleep Study with PAP Titration     Order Specific Question:   Sleep Study Titration Type     Answer:   CPAP        Recommendations:     - I went over results of sleep study with patient in details and answered all his questions  -We will order sleep study with titration.  -Weight loss and exercise has been advised.  -No driving if sleepy or drowsy otherwise impaired. -He will follow-up with me 4 to 6 weeks after starting on the machine. .   Return in about 3 months (around 7/11/2022).        Electronically signed by Yaritza Chi MD on 4/11/2022 at 12:43 PM

## 2022-05-16 NOTE — PROGRESS NOTES
Patient Name: Tigre Current :         Date: 2022      Type of Appt: Consult    Reason for appt: REFERRAL FROM DR. NOONAN PER WIFE, MRI AT [facility], BULGING DISC. NEEDS LATE APPOINTMENT PLEASE DO NOT MOVE UP DUE TO DYALISIS. Spinal stenosis of lumbar region with neurogenic claudication    Studies [facility]     Smoking:  No                        [facility]) Physicians  Neurosurgery and Pain [address], [address] [facility] , [address] [facility] 82: [phone]  F: [phone]          Patient:  Tigre Current  YOB: 1955  Date: 2022    The patient is a 78 y.o. male who presents today for evaluation of the following problems:     Chief Complaint   Patient presents with    Back Pain        Referred by Raissa Yusuf MD    Estimated body mass index is 42.72 kg/m² as calculated from the following:    Height as of this encounter: 6' (1.829 m). Weight as of this encounter: 315 lb (142.9 kg).     PAST MEDICAL, FAMILY AND SOCIAL HISTORY:  Past Medical History:   Diagnosis Date    Chronic kidney disease     Hemodialysis patient ([facility] [address] 2008    MWF    Hyperlipidemia     Hypertension     Renal failure     Sleep apnea      Past Surgical History:   Procedure Laterality Date    COLONOSCOPY      Polyps , none in     COLONOSCOPY N/A 2020    COLORECTAL CANCER SCREENING, HIGH RISK performed by Sy Palafox MD at [address]     TONSILLECTOMY AND ADENOIDECTOMY      UVULECTOMY       Family History   Problem Relation Age of Onset    Kidney Disease Mother     Cancer Father     Heart Attack Father      Current Outpatient Medications on File Prior to Visit   Medication Sig Dispense Refill    CPAP Machine MISC by Does not apply route Disp Cap, Hose,Face Mask and filter every three months 1 each 3    gabapentin (NEURONTIN) 300 MG capsule TK ONE C PO HS AFTER DIALYSIS 5 DAYS A WEEK 100 capsule 1    Respiratory Therapy Supplies (NEBULIZER/TUBING/MOUTHPIECE) KIT Uses nightly 1 kit 0    CPAP Machine MISC by Does not apply route Disp tubing and mask and head gear every three months. 1 each 0    levETIRAcetam (KEPPRA) 250 MG tablet Take 1.5 tablets by mouth 2 times daily 270 tablet 3    Calcium Acetate, Phos Binder, 667 MG CAPS Take 2 capsules by mouth 3 times daily       celecoxib (CELEBREX) 200 MG capsule Take 1 capsule by mouth daily 90 capsule 3    B Complex-C-Folic Acid (KAYE-LEILANI) TABS TAKE 1 TABLET BY MOUTH EVERY DAY      VASCEPA 1 g CAPS capsule TK 2 CS PO BID      torsemide (DEMADEX) 100 MG tablet Take 100 mg by mouth      rosuvastatin (CRESTOR) 10 MG tablet Take 10 mg by mouth      cinacalcet (SENSIPAR) 30 MG tablet TAKE 1 TABLET BY MOUTH ONCE A DAY AS DIRECTED WITH THE EVENING MEAL      aspirin (ECOTRIN LOW STRENGTH) 81 MG EC tablet Take by mouth       No current facility-administered medications on file prior to visit. Social History     Tobacco Use    Smoking status: Never Smoker    Smokeless tobacco: Never Used   Vaping Use    Vaping Use: Never used   Substance Use Topics    Alcohol use: Not Currently    Drug use: Not Currently       ALLERGIES  No Known Allergies      REVIEW OF SYSTEMS:  Constitutional: Negative for appetite change. Eyes: Symmetric, negative for redness or swelling. Head, Ears, Nose, Throat: Negative for congestion and tinnitus. No hearing loss. Respiratory: Negative for apnea and chest tightness. No shortness of breath. Cardiovascular: Negative for chest pain. Gastrointestinal: Negative for abdominal distention. No diarrhea nausea constipation. Endocrine: Negative for cold intolerance and heat intolerance. Genitourinary: On dialysis  Integumentary (skin and/or breast): Negative for color change. No rashes. Allergic/Immunologic: Negative for environmental allergies. Neurological: Negative for dizziness, tremors, seizures and numbness. No headaches. Psychiatric/Behavioral: Negative for agitation, behavioral problems and confusion. No sleep disturbance. Musculoskeletal: Negative for atrophy, fasciculations. Back pain  Hematologic/lymphatic: Negative for lymphadenopathy. Does not bruise or bleed easily. I have personally reviewed the PMH, PSH, family history, home medications, social history, allergies, ROS. Physical Exam:      Vitals:    05/17/22 1533   BP: 118/80   Site: Right Upper Arm   Temp: 97.1 °F (36.2 °C)   TempSrc: Temporal   Weight: (!) 315 lb (142.9 kg)   Height: 6' (1.829 m)       Physical Exam:  Vitals and notes reviewed. Constitutional:  See above height, weight, pulse rate, and blood pressure. BMI 42     Appearance: Normal appearance. Head:      Normocephalic and atraumatic. Ears, Nose, Mouth, and Throat:      Normal shape, no discharge, deglutition intact  Eyes:      Extraocular Movements: Extraocular movements intact. Pupils: Pupils are equal, round, and reactive to light. Cardiovascular:      Pulses: Normal radialis pulses. Heart sounds: Normal heart sounds, regular rate, no rubs or murmurs. Respiratory:      lungs clear to auscultation  Abdomen:   Obese     Soft to palpation. Bowel sounds present. Genitourinary:      Normal for sex  Musculoskeletal: Ring-type of gait secondary to obesity. No toe walk secondary to neuropathy. Is able to raise his toes off the floor on his heels but no gait. Decreased sensation both lower extremities left greater than right secondary to his neuropathy. Absent reflexes     Gait: Regular walk and toe walk and heel walk intact. General: Normal range of motion. Extremities well developed and symmetric. Muscle tone normal with no spasticity or fasciculations. Skin:     General: Skin is warm and dry. Capillary Refill: Capillary refill less than 2 seconds. Neurological:      Mental Status: Alert and oriented to person, place, and time.       Cranial nerves individually tested 2 through 12 normal  Hematologic/Lymphatic/Immunologic:      Negative for lymphadenopathy, hematomas. Psychiatric:         Mood and Affect: Mood normal. Appropriate affect    I have reviewed all laboratory studies, reports, data, and pertinent images. 2/23/2022 MRI lumbar spine  EXAMINATION:  MRI LUMBAR SPINE WO CONTRAST       CLINICAL HISTORY:  Spinal stenosis of lumbar region with neurogenic claudication        TECHNIQUE: Routine lumbosacral spine MR protocol without gadolinium.        COMPARISON: Lumbar spine x-ray 1/21/2021.        RESULT:           LUMBAR:       Counting reference:  Lumbosacral junction.  For the purposes of this report, L4-5 is considered the level of the iliac crest and there are 5 lumbar-type vertebrae.  Anatomic Variants: None.       Alignment:   Lumbar lordosis is maintained. Vertebral body heights and disc spaces are within normal limits.        Bone marrow signal/fracture:   No evidence of pathologic marrow infiltration. L1 vertebral body osseous hemangioma. No evidence of prior fracture.        Conus:    The conus is within normal limits of signal intensity and morphology.         Paraspinal soft tissues:    Paraspinal soft tissues are within normal limits.        Multilevel degenerative changes of the lumbar spine including disc bulges, facet degenerative changes and ligamentum flavum hypertrophy.       T12-L1:  Canal and foramina are patent.       L1-L2:  Canal and foramina are patent.       L2-L3:  Canal and foramina are patent.       L3-L4:  Small circumferential disc bulge, ligamentum flavum hypertrophy and mild facet degenerative changes with flattening of ventral thecal sac. Neural foramen are patent.       L4-L5:  Circumferential disc bulge, facet degenerative changes and ligamentum flavum hypertrophy with moderate canal stenosis. Narrowing of the bilateral subarticular recesses. Moderate right and mild left neural foraminal narrowing.  Abutment of the    exiting right L4 nerve root.       L5-S1:  Circumferential disc bulge, ligamentum flavum hypertrophy and facet degenerative changes with mild canal stenosis. There is a 3 mm left synovial cyst. Neural foramen are patent.       Sacrum and iliac wings:   The visualized sacrum and iliac wings are within normal limits.  The presacral soft tissues are normal in appearance.        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 Abutment of the exiting right L4 nerve root. Findings compatible with polycystic kidney disease.       Anatomic Thoracic/Lumbar Variant: Transitional S1 vertebral body.  L4-5 is considered the level of the iliac crest and there are 5 lumbar-type vertebrae. 2/23/2022 MRI lumbar spine        HISTORY OF PRESENT ILLNESS:    Annie Gaxiola morbidly obese individual with polycystic kidney disease has been dialysis for many years. He retired last year. Monday Wednesday Friday he had very long days working and then going to dialysis and then coming home. He only had some time on Tuesdays and Thursdays. Since retiring last year he has had increasing low back pain. He stands 3 medicine gets severe pain across his low back going down the lower extremities left greater than right. Most the pain is in the back. He is also developed tremor in his hands. He has some numbness in his upper extremities but he has functional.  He has a peripheral neuropathy with numbness in the both lower extremities and is not certain of the etiology. He had a full battery of tests of his upper part including brain and neck and he was told he does not have Parkinson's disease.   He wants a kidney transplant but he is told he has to lose weight before he can be transplanted. He just had a whole battery of test by his cardiologist and is awaiting results    Using MRI images which I printed out for him he has mild to moderate L4-5 canal stenosis with evidence of lumbar spondylitis and facet joint disease. He is not a candidate for neuro spine surgery. Suggest treatment for his obesity inflammatory lumbar pain and a course of physical therapy.   Plan physical therapy      Calli Florez MD

## 2022-05-17 ENCOUNTER — INITIAL CONSULT (OUTPATIENT)
Dept: NEUROSURGERY | Age: 67
End: 2022-05-17
Payer: MEDICARE

## 2022-05-17 VITALS
SYSTOLIC BLOOD PRESSURE: 118 MMHG | BODY MASS INDEX: 42.66 KG/M2 | TEMPERATURE: 97.1 F | HEIGHT: 72 IN | DIASTOLIC BLOOD PRESSURE: 80 MMHG | WEIGHT: 315 LBS

## 2022-05-17 DIAGNOSIS — M47.816 LUMBAR SPONDYLOSIS: Primary | ICD-10-CM

## 2022-05-17 DIAGNOSIS — E66.01 MORBIDLY OBESE (HCC): ICD-10-CM

## 2022-05-17 DIAGNOSIS — Q61.3 POLYCYSTIC KIDNEY DISEASE: ICD-10-CM

## 2022-05-17 DIAGNOSIS — G63 RENAL NEUROPATHY (HCC): ICD-10-CM

## 2022-05-17 DIAGNOSIS — N28.9 RENAL NEUROPATHY (HCC): ICD-10-CM

## 2022-05-17 PROCEDURE — G8427 DOCREV CUR MEDS BY ELIG CLIN: HCPCS | Performed by: NEUROLOGICAL SURGERY

## 2022-05-17 PROCEDURE — G8417 CALC BMI ABV UP PARAM F/U: HCPCS | Performed by: NEUROLOGICAL SURGERY

## 2022-05-17 PROCEDURE — 99202 OFFICE O/P NEW SF 15 MIN: CPT | Performed by: NEUROLOGICAL SURGERY

## 2022-05-22 DIAGNOSIS — G62.9 NEUROPATHY: ICD-10-CM

## 2022-05-23 RX ORDER — GABAPENTIN 300 MG/1
CAPSULE ORAL
Qty: 65 CAPSULE | Refills: 3 | Status: SHIPPED | OUTPATIENT
Start: 2022-05-23 | End: 2022-08-17 | Stop reason: SDUPTHER

## 2022-06-08 ENCOUNTER — HOSPITAL ENCOUNTER (OUTPATIENT)
Dept: PHYSICAL THERAPY | Age: 67
Setting detail: THERAPIES SERIES
Discharge: HOME | End: 2022-06-08
Payer: MEDICARE

## 2022-06-08 PROCEDURE — 97162 PT EVAL MOD COMPLEX 30 MIN: CPT

## 2022-06-08 PROCEDURE — 97110 THERAPEUTIC EXERCISES: CPT

## 2022-06-08 ASSESSMENT — PAIN DESCRIPTION - FREQUENCY: FREQUENCY: INTERMITTENT

## 2022-06-08 ASSESSMENT — PAIN DESCRIPTION - DESCRIPTORS: DESCRIPTORS: SHARP;TIGHTNESS

## 2022-06-08 ASSESSMENT — PAIN - FUNCTIONAL ASSESSMENT: PAIN_FUNCTIONAL_ASSESSMENT: PREVENTS OR INTERFERES WITH MANY ACTIVE NOT PASSIVE ACTIVITIES

## 2022-06-08 ASSESSMENT — PAIN DESCRIPTION - LOCATION: LOCATION: BACK;LEG

## 2022-06-08 ASSESSMENT — PAIN DESCRIPTION - PAIN TYPE: TYPE: CHRONIC PAIN

## 2022-06-08 ASSESSMENT — PAIN DESCRIPTION - ONSET: ONSET: ON-GOING

## 2022-06-08 ASSESSMENT — PAIN DESCRIPTION - ORIENTATION: ORIENTATION: LEFT;RIGHT;POSTERIOR

## 2022-06-08 ASSESSMENT — PAIN SCALES - GENERAL: PAINLEVEL_OUTOF10: 0

## 2022-06-08 NOTE — PROGRESS NOTES
[address]  PHYSICAL THERAPY EVALUATION      Physical Therapy: Initial Evaluation    Patient: Marielle Lyn (31 y.o.     male)   Examination Date:   Plan of Care Certification Period: 2022 to 22  Progress Note Counter: - (PN due 22)   :  1955 ;    Confirmed: Yes MRN: [id number]  CSN: [id number]   Insurance: Payor: MEDICARE / Plan: MEDICARE PART A AND B / Product Type: *No Product type* /   Insurance ID: [id number] - (Medicare) Secondary Insurance (if applicable):  Lizz Armstrong   Referring Physician: Marci Jessica MD     PCP: Cristela Lantigua MD Visits to Date/Visits Approved:     No Show/Cancelled Appts: 0  0     Medical Diagnosis: Spondylosis without myelopathy or radiculopathy, lumbar region [M47.816] Morbidly obese ([facility] [address] Lumbar spondylosis  Treatment Diagnosis: increased low back pain, decreased lumbar AROM, decreased core & lumbar & bilateral LE strength, decreased functional endurance & ability to perform functional mobility tasks & ADLs, & impaired postural awareness     PERTINENT MEDICAL HISTORY   Patient Assessed for Rehabilitation Services: Yes  Self reported health status[de-identified] Good    Medical History: Chart Reviewed: Yes   Past Medical History:   Diagnosis Date    Chronic kidney disease     Hemodialysis patient ([facility] [address] 2008    MWF    Hyperlipidemia     Hypertension     Renal failure     Sleep apnea      Surgical History:   Past Surgical History:   Procedure Laterality Date    COLONOSCOPY      Polyps , none in     COLONOSCOPY N/A 2020    COLORECTAL CANCER SCREENING, HIGH RISK performed by Twin Palacios MD at [address] Left     TONSILLECTOMY AND ADENOIDECTOMY      UVULECTOMY         Medications:   Current Outpatient Medications:     gabapentin (NEURONTIN) 300 MG capsule, TAKE 1 CAPSULE BY MOUTH AT  BEDTIME AFTER DIALYSIS 5  DAYS PER WEEK, Disp: 65 capsule, Rfl: 3    CPAP Machine MISC, by Does not apply route Disp Cap, Hose,Face Mask and filter every three months, Disp: 1 each, Rfl: 3    Respiratory Therapy Supplies (NEBULIZER/TUBING/MOUTHPIECE) KIT, Uses nightly, Disp: 1 kit, Rfl: 0    CPAP Machine MISC, by Does not apply route Disp tubing and mask and head gear every three months., Disp: 1 each, Rfl: 0    levETIRAcetam (KEPPRA) 250 MG tablet, Take 1.5 tablets by mouth 2 times daily, Disp: 270 tablet, Rfl: 3    Calcium Acetate, Phos Binder, 667 MG CAPS, Take 2 capsules by mouth 3 times daily , Disp: , Rfl:     celecoxib (CELEBREX) 200 MG capsule, Take 1 capsule by mouth daily, Disp: 90 capsule, Rfl: 3    B Complex-C-Folic Acid (KAYE-LEILANI) TABS, TAKE 1 TABLET BY MOUTH EVERY DAY, Disp: , Rfl:     VASCEPA 1 g CAPS capsule, TK 2 CS PO BID, Disp: , Rfl:     torsemide (DEMADEX) 100 MG tablet, Take 100 mg by mouth, Disp: , Rfl:     rosuvastatin (CRESTOR) 10 MG tablet, Take 10 mg by mouth, Disp: , Rfl:     cinacalcet (SENSIPAR) 30 MG tablet, TAKE 1 TABLET BY MOUTH ONCE A DAY AS DIRECTED WITH THE EVENING MEAL, Disp: , Rfl:     aspirin (ECOTRIN LOW STRENGTH) 81 MG EC tablet, Take by mouth, Disp: , Rfl:   Allergies: Patient has no known allergies. SUBJECTIVE EXAMINATION     History obtained from[de-identified] Patient,Chart Review,      Family/Caregiver Present: No    Subjective History: Onset Date:  (~years)  Subjective: Patient reports low back pain started ~2-3 years prior with no known ANA. Patient reports he has been retired for ~1 year. Patient report having dialysis for 14+ years. Dr. Genoveva Fleming saw patient recently & said that patient is not a surgical candidate at this time. Patient reports standing activity tolerance for <2 minutes. Patient reports neuropathy in bilateral feet. Patient reports decreased ambulation distance ~30' x 2. Patient report difficulty with uneven surfaces & difficulty turning. Patient denies falling in the past 6 months. Patient reports mainly having pain in left side of back however the pain is bilateral.  Pain intermittently shoots down left LE. Patient has not tried PT services at this time. Patient has not received any injections. Additional Pertinent Hx (if applicable): arthritis, kidney disease, high blood pressure   Prior diagnostic testing[de-identified] MRI  Any changes to allergies, medications, or have you had any medical procedures/ER visits since your last visit?: No  Comments: RTD = TBD; RTD = August 2022; PLOF = 100% & CLOF = 25%; MRI LUMBAR 2022: Multilevel degenerative changes of the lumbar spine with up to moderate canal stenosis and neural foraminal narrowing, most prominent at L4-L5. Abutment of the exiting right L4 nerve root. Findings compatible with polycystic kidney disease. Anatomic Thoracic/Lumbar Variant: Transitional S1 vertebral body. L4-5 is considered the level of the iliac crest and there are 5 lumbar-type vertebrae.       Learning/Language: Learning  Does the patient/guardian have any barriers to learning?: No barriers  Will there be a co-learner?: No  What is the preferred language of the patient/guardian?: English  Is an  required?: No  How does the patient/guardian prefer to learn new concepts?: Listening,Reading,Demonstration     Pain Screening    Pain Screening  Patient Currently in Pain: Yes  Pain Assessment: 0-10  Pain Level: 0  Best Pain Level: 0  Worst Pain Level: 7  Patient's Stated Pain Goal: 3  Pain Type: Chronic pain  Pain Location: Back,Leg  Pain Orientation: Left,Right,Posterior  Pain Radiating Towards: intermittently down left LE  Pain Descriptors: Sharp,Tightness  Pain Frequency: Intermittent  Pain Onset: On-going  Functional Pain Assessment: Prevents or interferes with many active not passive activities  Aggravating factors: Standing,Walking,Movement  Pain Management/Relieving Factors: Rest,Sitting    Functional Status    Social History:    Social History  Lives With: Family  Type of Home: House  Home Layout: One level  Home Access: Stairs to enter with rails  Entrance Stairs - Rails: Left  Entrance Stairs - Number of Steps: 3    Occupation/Interests:   Occupation: Retired    Prior Level of Function:     Independent      Current Level of Function:   Independent      ADL Assistance: Independent  Homemaking Assistance: Independent  Homemaking Responsibilities: Yes  Ambulation Assistance: Independent  Transfer Assistance: Independent  Active : Yes  Mode of Transportation: Car    Dominant Hand: : Right    OBJECTIVE EXAMINATION     Review of Systems:  Vision: Impaired  Visual Deficits: Wears glasses  Hearing: Within functional limits  Overall Orientation Status: Within Normal Limits  Patient affect[de-identified] Normal  Follows Commands: Within Functional Limits    Observations:   General Observations  General Observations: Yes  Description: forward flexed posture, no tenderness to palpation of lumbar paraspinals, QLs, or gluteals; tightness of bilateral hamstrings, quads, & ITBs    Mobility:   Ambulation  WB Status: full weight bearing  Ambulation  Device: No Device  Assistance: Modified Independent  Gait Deviations: Slow Dina,Decreased step height,Decreased step length,Increased KATEY  Distance: clinic distances  Stairs/Curb  Stairs?: Yes  Stairs  # Steps : 4  Stairs Height: 6\"  Rails: Bilateral  Device: No Device  Assistance: Modified independent   Comment: non-reciprocal    Left AROM  Right AROM         AROM LLE (degrees)  LLE AROM : WFL (limited secondary to body habitus)    AROM RLE (degrees)  RLE AROM: WFL (limited secondary to body habitus)       Left Strength  Right Strength      Strength Other  Other: core = Poor+/Fair-; lumbar extension = anticipate decreased - unable to get into position  Other: core = Poor+/Fair-; lumbar extension = anticipate decreased - unable to get into position  Strength LLE  Comment: hip flexion = 4-/5; knee extension = 5/5; knee flexion = 4+/5; hip abduction = 3+/5; hip adduction = 3-/5 Strength Other  Other: core = Poor+/Fair-; lumbar extension = anticipate decreased - unable to get into position  Other: core = Poor+/Fair-; lumbar extension = anticipate decreased - unable to get into position  Strength RLE  Comment: hip flexion = 3+/5; knee extension = 5/5; knee flexion = 4+/5; hip abduction = 3+/5; hip adduction = 3-/5     Lumbar Assessment        Lumbar: flexion = 50%; extension = 25-50%; left side bend = 25-50%; right side bend = 25-50%; left rotation = 50-75%; right rotation = 50-75% (no pain with motions just \"tightness\")     Outcomes Score:  Exam: Oswestry = 22/50    Treatment:    Exercises:   Exercises  Exercise 1: scapular rows with YTB, 1 set x 15 reps x 5 second hold  Exercise 2: lat pull downs/triceps extension with YTB, 1 set x 15 reps x 5 second hold  Exercise 3: hamstring stretch*  Exercise 4: ITB stretch*  Exercise 5: SLR*  Exercise 6: SLR 4-way*  Exercise 7: hip 4-way*  Exercise 8: bike*  Exercise 20: HEP: rows & lat pull downs/triceps extension     Modalities:  Modalities:  (CP/HP lumbar*)     Manual:  Manual Therapy  Joint Mobilization: PA mobs*  Manual Traction: trial leg pulls*  Soft Tissue Mobilizaton: lumbar region*     *Indicates exercise,modality, or manual techniques to be initiated when appropriate    ASSESSMENT     Impression: Assessment: Patient is a 78year old male who presents with chronic low back pain that intermittently radiates down LLE that has been on-going for years. Patient demonstrates increased low back pain, decreased lumbar AROM, decreased core & lumbar & bilateral LE strength, decreased functional endurance & ability to perform functional mobility tasks & ADLs, & impaired postural awareness. Patient would benefit from outpatient PT services in order to address these impairments as well as improve patient's QOL & ease with ADLs.     Body Structures, Functions, Activity Limitations Requiring Skilled Therapeutic Intervention: Decreased functional mobility ,Decreased ADL status,Decreased ROM,Decreased strength,Decreased endurance,Increased pain,Decreased posture    Statement of Medical Necessity: Physical Therapy is both indicated and medically necessary as outlined in the POC to increase the likelihood of meeting the functionally related goals stated below. Patient's Activity Tolerance: Patient tolerated evaluation without incident,Patient tolerated treatment well      Patient's rehabilitation potential/prognosis is considered to be: Factors which may impact rehabilitation potential include:       Patient Education: PT Lida Cabrera of Care,Evaluative findings,Insurance,Home Exercise Program      GOALS   Patient Goal(s): Patient goals : \"decrease pain\"    Short Term Goals Completed by 2-4 weeks Goal Status   The patient will demonstrate improved postural awareness requiring <25% verbal cueing during functional mobility tasks & exercises New   The patient will self-report >10 minutes consistently standing activity tolerance without increased pain in order to demonstrate improved functional endurance. New   The patient will ambulate >5 minutes consistently all surfaces independently consistently with LRD without increase in pain in order to safely ambulate in the community New   The patient will navigate 12 stairs independently with reciprocal stair pattern with LRD in order to safely get up/down from basement to perform laundry New     Long Term Goals Completed by 4-6 weeks Goal Status   The patient will report decreased low back pain </=3/10 consistently with movement in order to improve ability to perform functional mobility tasks New   The patient will improve pain free lumbar AROM >/= 10-15* in order to increase ability to perform functional mobility tasks efficiently.  New   The patient will demonstrate competency with HEP to progress towards self management of symptoms upon D/C New   The patient will demonstrate improved B LE & core & lumbar strength >/=4/5 or Fair+ in order to perform functional mobility tasks with increased ease. New   The patient will have a decrease in Oswestry score >/=6 points in order to increase functional activity tolerance New     TREATMENT PLAN       Requires PT Follow-Up: Yes    Treatment may include any combination of the following: Strengthening,ROM,Balance training,Gait training,Stair training,Functional mobility training,Neuromuscular re-education,Manual Therapy - Soft Tissue Mobilization,Manual Therapy - Joint Manipulation,Endurance training,Safety education & training,Patient/Caregiver education & training,Therapeutic activities,Aquatics,Positioning,Modalities,Home exercise program,Pain management     Frequency / Duration:  Patient to be seen 1-2xs/wk times per week for 4-6 weeks weeks  Plan Comment:               Eval Complexity:   Decision Making: Medium Complexity  History: Personal Factors and/or Comorbidities Impacting POC: Medium  Examination of body system(s) including body structures and functions, activity limitations, and/or participation restrictions: Medium  Exam: Oswestry = 22/50  Clinical Presentation: Medium    POST-PAIN     Pain Rating (0-10 pain scale):  0 /10  Location and pain description same as pre-treatment unless indicated. Action: [] NA  [] Call Physician  [x] Perform HEP  [] Meds as prescribed    Evaluation and patient rights have been reviewed and patient agrees with plan of care.   Yes  [x]  No  []   Explain:     Nadine Fall Risk Assessment  Risk Factor Scale  Score   History of Falls [] Yes  [x] No 25  0    Secondary Diagnosis [] Yes  [x] No 15  0    Ambulatory Aid [] Furniture  [] Crutches/cane/walker  [x] None/bedrest/wheelchair/nurse 30  15  0    IV/Heparin Lock [] Yes  [x] No 20  0    Gait/Transferring [] Impaired  [] Weak  [x] Normal/bedrest/immobile 20  10  0    Mental Status [] Forgets limitations  [x] Oriented to own ability 15  0       Total:0     Based on the Assessment score: check the appropriate box.   [x]  No intervention needed   Low =   Score of 0-24  []  Use standard prevention interventions Moderate =  Score of 24-44   [] Discuss fall prevention strategies   [] Indicate moderate falls risk on eval  []  Use high risk prevention interventions High = Score of 45 and higher   [] Discuss fall prevention strategies   [] Provide supervision during treatment time      Minutes:  PT Individual Minutes  Time In: 1355  Time Out: 1440  Minutes: 45  Timed Code Treatment Minutes: 15 Minutes  Procedure Minutes: 30 minutes evaluation     Timed Activity Minutes Units   Ther Ex 15 1     Electronically signed by Meir Keys PT on 6/8/22 at 2:48 PM EDT

## 2022-06-08 NOTE — PROGRESS NOTES
[address]     Ph: [phone]  Fax: [phone]      [x] Certification  [] Recertification [x]  Plan of Care  [] Progress Note [] Discharge      Referring Provider: Gayle Cobb MD   From:  Francisco Maria, PT , DPT  Patient: Kenroy Pinto (89 y.o. male) : 1955 Date: 2022   Medical Diagnosis: Spondylosis without myelopathy or radiculopathy, lumbar region [M47.816] Morbidly obese ([facility] [address] Lumbar spondylosis  Treatment Diagnosis: increased low back pain, decreased lumbar AROM, decreased core & lumbar & bilateral LE strength, decreased functional endurance & ability to perform functional mobility tasks & ADLs, & impaired postural awareness    Plan of Care/Certification Expiration Date: : 22   Progress Report Period from:  2022  to 2022    Visits to Date: 1 No Show: 0 Cancelled Appts: 0    OBJECTIVE:   Short Term Goals - Time Frame for Short term goals: 2-4 weeks    Goals Current/Discharge status  Status   Short term goal 1: The patient will demonstrate improved postural awareness requiring <25% verbal cueing during functional mobility tasks & exercises  On-going, decreased postural awareness New   Short term goal 2: The patient will self-report >10 minutes consistently standing activity tolerance without increased pain in order to demonstrate improved functional endurance.   <2 minutes consistently New   Short term goal 3: The patient will ambulate >5 minutes consistently all surfaces independently consistently with LRD without increase in pain in order to safely ambulate in the community  <2 minutes consistently New   Short term goal 4: The patient will navigate 12 stairs independently with reciprocal stair pattern with LRD in order to safely get up/down from basement to perform laundry  4 stairs, non-reciprocally, bilateral rails New     Long Term Goals - Time Frame for Long term goals : 4-6 weeks  Goals Current/ Discharge status Status   Long term goal 1: The patient will report decreased low back pain </=3/10 consistently with movement in order to improve ability to perform functional mobility tasks Pain Screening  Patient Currently in Pain: Yes  Pain Assessment: 0-10  Pain Level: 0  Best Pain Level: 0  Worst Pain Level: 7  Patient's Stated Pain Goal: 3  Pain Type: Chronic pain  Pain Location: Back,Leg  Pain Orientation: Left,Right,Posterior  Pain Radiating Towards: intermittently down left LE  Pain Descriptors: Sharp,Tightness  Pain Frequency: Intermittent  Pain Onset: On-going  Functional Pain Assessment: Prevents or interferes with many active not passive activities  Aggravating factors: Standing,Walking,Movement  Pain Management/Relieving Factors: Rest,Sitting New   Long term goal 2: The patient will improve pain free lumbar AROM >/= 10-15* in order to increase ability to perform functional mobility tasks efficiently. AROM LLE (degrees)  LLE AROM : WFL (limited secondary to body habitus)   AROM RLE (degrees)  RLE AROM: WFL (limited secondary to body habitus)      Spine  Lumbar: flexion = 50%; extension = 25-50%; left side bend = 25-50%; right side bend = 25-50%; left rotation = 50-75%; right rotation = 50-75% (no pain with motions just \"tightness\")  New   Long term goal 3: The patient will demonstrate competency with HEP to progress towards self management of symptoms upon D/C On-going, initiated this date New   Long term goal 4: The patient will demonstrate improved B LE & core & lumbar strength >/=4/5 or Fair+ in order to perform functional mobility tasks with increased ease.  Strength LLE  Comment: hip flexion = 4-/5; knee extension = 5/5; knee flexion = 4+/5; hip abduction = 3+/5; hip adduction = 3-/5  Strength RLE  Comment: hip flexion = 3+/5; knee extension = 5/5; knee flexion = 4+/5; hip abduction = 3+/5; hip adduction = 3-/5      Other: core = Poor+/Fair-; lumbar extension = anticipate decreased - unable to get into position New   Long term goal 5: The patient will have a decrease in Oswestry score >/=6 points in order to increase functional activity tolerance Exam: Oswestry = 22/50     New     Body Structures, Functions, Activity Limitations Requiring Skilled Therapeutic Intervention: Decreased functional mobility ,Decreased ADL status,Decreased ROM,Decreased strength,Decreased endurance,Increased pain,Decreased posture  Assessment: Patient is a 78year old male who presents with chronic low back pain that intermittently radiates down LLE that has been on-going for years. Patient demonstrates increased low back pain, decreased lumbar AROM, decreased core & lumbar & bilateral LE strength, decreased functional endurance & ability to perform functional mobility tasks & ADLs, & impaired postural awareness. Patient would benefit from outpatient PT services in order to address these impairments as well as improve patient's QOL & ease with ADLs. PT Education: PT Role;Goals;Plan of Care;Evaluative findings; Insurance;Home Exercise Program    PLAN: [x] Evaluate and Treat  Frequency/Duration:  Plan Frequency: 1-2xs/wk  Plan weeks: 4-6 weeks  Current Treatment Recommendations: Strengthening,ROM,Balance training,Gait training,Stair training,Functional mobility training,Neuromuscular re-education,Manual Therapy - Soft Tissue Mobilization,Manual Therapy - Joint Manipulation,Endurance training,Safety education & training,Patient/Caregiver education & training,Therapeutic activities,Aquatics,Positioning,Modalities,Home exercise program,Pain management     Precautions:       arthritis, kidney disease, high blood pressure                     Patient Status:[x] Continue/ Initiate plan of Care    [] Discharge PT. Recommend pt continue with HEP.      [] Additional visits requested, Please re-certify for additional visits:    [] Hold         Signature: Electronically signed by Ophelia Harrison PT on 6/8/22 at 2:51 PM EDT      If you have any questions or concerns, please don't hesitate to call. Thank you for your referral.    I have reviewed this plan of care and certify a need for medically necessary rehabilitation services.     Physician Signature:__________________________________________________________  Date:  Please sign and return

## 2022-06-09 ENCOUNTER — HOSPITAL ENCOUNTER (OUTPATIENT)
Dept: SLEEP CENTER | Age: 67
Discharge: HOME | End: 2022-06-11
Payer: MEDICARE

## 2022-06-09 PROCEDURE — 95811 POLYSOM 6/>YRS CPAP 4/> PARM: CPT

## 2022-06-11 PROCEDURE — 95811 POLYSOM 6/>YRS CPAP 4/> PARM: CPT | Performed by: INTERNAL MEDICINE

## 2022-06-15 ENCOUNTER — HOSPITAL ENCOUNTER (OUTPATIENT)
Dept: PHYSICAL THERAPY | Age: 67
Setting detail: THERAPIES SERIES
Discharge: HOME | End: 2022-06-15
Payer: MEDICARE

## 2022-06-15 PROCEDURE — 97140 MANUAL THERAPY 1/> REGIONS: CPT

## 2022-06-15 PROCEDURE — 97110 THERAPEUTIC EXERCISES: CPT

## 2022-06-15 ASSESSMENT — PAIN DESCRIPTION - LOCATION: LOCATION: BACK

## 2022-06-15 ASSESSMENT — PAIN DESCRIPTION - ORIENTATION: ORIENTATION: LOWER

## 2022-06-15 ASSESSMENT — PAIN SCALES - GENERAL: PAINLEVEL_OUTOF10: 5

## 2022-06-15 ASSESSMENT — PAIN DESCRIPTION - DESCRIPTORS: DESCRIPTORS: SPASM;TIGHTNESS

## 2022-06-15 NOTE — PROGRESS NOTES
[facility]  Outpatient Physical Therapy    Treatment Note        Date: 6/15/2022  Patient: Yoni Singh  : 1955   Confirmed: Yes  MRN: [id number]  Referring Provider: Pooja Torres MD   Secondary Referring Provider (If applicable):     Medical Diagnosis: Spondylosis without myelopathy or radiculopathy, lumbar region [M47.816]    Treatment Diagnosis: increased low back pain, decreased lumbar AROM, decreased core & lumbar & bilateral LE strength, decreased functional endurance & ability to perform functional mobility tasks & ADLs, & impaired postural awareness    Visit Information:  Insurance: Payor: Timmy Hemphill / Plan: MEDICARE PART A AND B / Product Type: *No Product type* /   PT Visit Information  Onset Date:  (~years)  Total # of Visits Approved: 99  Total # of Visits to Date: 2  Plan of Care/Certification Expiration Date: 22  No Show: 0  Progress Note Due Date: 22  Canceled Appointment: 0  Progress Note Counter: - (PN due 22)    Subjective Information:  Subjective: Pt reports 5/10 LBP occasionally rad to left knee. HEP Compliance:  [x] Good [] Fair [] Poor [] Reports not doing due to:    Pain Screening  Patient Currently in Pain: Yes  Pain Level: 5  Pain Location: Back  Pain Orientation: Lower  Pain Descriptors: Spasm,Tightness    Treatment:  Exercises:  Exercises  Exercise 2: lat pull downs/triceps extension with YTB, 1 set x 20 reps x 5 second hold  Exercise 3: hamstring stretch 30 sec x 3 [place]. Exercise 4: ITB stretch 20 sec x 3  Exercise 5: SLR TA x 10 [place]. (Quad lag right)  Exercise 8: SF L 1.0 x 5 minutes  Exercise 9: TA Isomertics 5 sec x 10  Exercise 10: H/L Abd & Add Ball & RTB x 15  Exercise 20: HEP: continue current, TA [place], SLR, H/L add & abd, HS str. Manual:   Manual Therapy  Manual Traction: Leg pulls 30 sec x 3 Good results  Soft Tissue Mobilizaton: PT declined prone position.        Modalities:   Declined       *Indicates exercise, modality, or manual techniques to be initiated when appropriate    Objective Measures:           Strength: [x] NT  [] MMT completed:        ROM: [x] NT  [] ROM measurements:         Assessment: Body Structures, Functions, Activity Limitations Requiring Skilled Therapeutic Intervention: Decreased functional mobility ,Decreased ADL status,Decreased ROM,Decreased strength,Decreased endurance,Increased pain,Decreased posture  Assessment: Initiated multiple stretching and strengthening exercises to improve mobility. Continue progressing current exercises. Initiated LE pulls with good results. Pt declined prone position\" not comfortable for me. Declined modalities. Treatment Diagnosis: increased low back pain, decreased lumbar AROM, decreased core & lumbar & bilateral LE strength, decreased functional endurance & ability to perform functional mobility tasks & ADLs, & impaired postural awareness             Post-Pain Assessment:       Pain Rating (0-10 pain scale): \"Less\" /10   Location and pain description same as pre-treatment unless indicated. Action: [] NA   [x] Perform HEP  [] Meds as prescribed  [] Modalities as prescribed   [] Call Physician     GOALS   Patient Goal(s): Patient goals : \"decrease pain\"    Short Term Goals Completed by 2-4 weeks Goal Status   STG 1 The patient will demonstrate improved postural awareness requiring <25% verbal cueing during functional mobility tasks & exercises In progress   STG 2 The patient will self-report >10 minutes consistently standing activity tolerance without increased pain in order to demonstrate improved functional endurance.  In progress   STG 3 The patient will ambulate >5 minutes consistently all surfaces independently consistently with LRD without increase in pain in order to safely ambulate in the community In progress   STG 4 The patient will navigate 12 stairs independently with reciprocal stair pattern with LRD in order to safely get up/down from basement to perform laundry In progress   STG 5           Long Term Goals Completed by 4-6 weeks Goal Status   LTG 1 The patient will report decreased low back pain </=3/10 consistently with movement in order to improve ability to perform functional mobility tasks In progress   LTG 2 The patient will improve pain free lumbar AROM >/= 10-15* in order to increase ability to perform functional mobility tasks efficiently. In progress   LTG 3 The patient will demonstrate competency with HEP to progress towards self management of symptoms upon D/C In progress   LTG 4 The patient will demonstrate improved B LE & core & lumbar strength >/=4/5 or Fair+ in order to perform functional mobility tasks with increased ease. In progress   LTG 5 The patient will have a decrease in Oswestry score >/=6 points in order to increase functional activity tolerance In progress   LTG 6               Plan:  Frequency/Duration:     Pt to continue current HEP. See objective section for any therapeutic exercise changes, additions or modifications this date.     Therapy Time:      PT Individual Minutes  Time In: 4695  Time Out: 8404  Minutes: 42  Timed Code Treatment Minutes: 42 Minutes  Procedure Minutes:0  Timed Activity Minutes Units   Ther Ex 37 2   Manual  5 1     Electronically signed by Citlaly Henson PTA on 6/15/22 at 2:03 PM EDT

## 2022-06-17 ENCOUNTER — HOSPITAL ENCOUNTER (OUTPATIENT)
Dept: PHYSICAL THERAPY | Age: 67
Setting detail: THERAPIES SERIES
Discharge: HOME | End: 2022-06-17
Payer: MEDICARE

## 2022-06-17 PROCEDURE — 97110 THERAPEUTIC EXERCISES: CPT

## 2022-06-17 ASSESSMENT — PAIN DESCRIPTION - LOCATION: LOCATION: BACK

## 2022-06-17 ASSESSMENT — PAIN DESCRIPTION - ORIENTATION: ORIENTATION: LEFT;LOWER

## 2022-06-17 ASSESSMENT — PAIN DESCRIPTION - PAIN TYPE: TYPE: CHRONIC PAIN

## 2022-06-17 ASSESSMENT — PAIN DESCRIPTION - DESCRIPTORS: DESCRIPTORS: TIGHTNESS

## 2022-06-17 ASSESSMENT — PAIN DESCRIPTION - FREQUENCY: FREQUENCY: INTERMITTENT

## 2022-06-17 ASSESSMENT — PAIN SCALES - GENERAL: PAINLEVEL_OUTOF10: 2

## 2022-06-17 NOTE — PROGRESS NOTES
[facility]  Outpatient Physical Therapy    Treatment Note        Date: 2022  Patient: Kristy Trent  : [birth year]   Confirmed: Yes  MRN: [id number]  Referring Provider: Maria De Jesus Ferrer MD   Secondary Referring Provider (If applicable):     Medical Diagnosis: Spondylosis without myelopathy or radiculopathy, lumbar region [M47.816] Morbidly obese ([address] Lumbar spondylosis  Treatment Diagnosis: increased low back pain, decreased lumbar AROM, decreased core & lumbar & bilateral LE strength, decreased functional endurance & ability to perform functional mobility tasks & ADLs, & impaired postural awareness    Visit Information:  Insurance: Payor: Jaki Ore / Plan: MEDICARE PART A AND B / Product Type: *No Product type* /   PT Visit Information  Onset Date:  (~years)  Total # of Visits Approved: 99  Total # of Visits to Date: 3  Plan of Care/Certification Expiration Date: 22  No Show: 0  Progress Note Due Date: 22  Canceled Appointment: 0  Progress Note Counter: 3/8- (PN due 22)    Subjective Information:  Subjective: Pt presenting to appt reporting \"maybe a 2\" pain level in LB \"more towards to Lt side. It's really aggrivating. It always feels like the calves are tight too. \" Pt reports LB begins tighening up after 5-6 min of waking. HEP Compliance:  [x] Good [] Fair [] Poor [] Reports not doing due to:    Pain Screening  Patient Currently in Pain: Yes  Pain Assessment: 0-10  Pain Level: 2  Pain Type: Chronic pain  Pain Location: Back  Pain Orientation: Left,Lower  Pain Descriptors: Tightness  Pain Frequency: Intermittent    Treatment:  Exercises:  Exercises  Exercise 3: hamstring stretch 30 sec x 3 [place]. seated w/ step stool, standing GS stretch w/ SB 3x30\"  Exercise 7: Standing sink exs: heel/toe raises x10 (introduce other 5/6 NV*)  Exercise 8: SF L 2.0 x 5 minutes  Exercise 9: Step ups F/L x10 ea 4\" box  Exercise 10:  H/L Abd & Add Ball & RTB x 20  Exercise 20: HEP: continue current, pt provided w/ RTB     Modalities:  Moist Heat (CPT 70316)  Patient Position: Seated  Number Minutes Moist Heat: 10 min  Moist heat location: Low back  Post treatment skin assessment: Redness - no adverse reaction     *Indicates exercise, modality, or manual techniques to be initiated when appropriate    Objective Measures:      Strength: [x] NT  [] MMT completed:     ROM: [x] NT  [] ROM measurements:       Assessment: Body Structures, Functions, Activity Limitations Requiring Skilled Therapeutic Intervention: Decreased functional mobility ,Decreased ADL status,Decreased ROM,Decreased strength,Decreased endurance,Increased pain,Decreased posture  Assessment: Continuation of current ex program w/ addition of GS stretch as pt c/o inc muscle tightness along post legs, inc relief. Progression of exs to include step up and select sink exs to extend current standing tolerance of <2 min as well as inc ability on stairs. Pt tolerating well despite total of 3 seated rest breaks needed throughout duration of 15 min of WBing activity. VCing given to improve upright trunk abd abd bracing. Pt needs to be area to negotiate 8 steps to reach laundry room on lower level. Concluded tx w/ MH to LB. Treatment Diagnosis: increased low back pain, decreased lumbar AROM, decreased core & lumbar & bilateral LE strength, decreased functional endurance & ability to perform functional mobility tasks & ADLs, & impaired postural awareness     Post-Pain Assessment:       Pain Rating (0-10 pain scale):   2/10   Location and pain description same as pre-treatment unless indicated.    Action: [] NA   [x] Perform HEP  [] Meds as prescribed  [] Modalities as prescribed   [] Call Physician     GOALS   Patient Goal(s): Patient goals : \"decrease pain\"    Short Term Goals Completed by 2-4 weeks Goal Status   STG 1 The patient will demonstrate improved postural awareness requiring <25% verbal cueing during functional mobility tasks & exercises In progress   STG 2 The patient will self-report >10 minutes consistently standing activity tolerance without increased pain in order to demonstrate improved functional endurance. In progress   STG 3 The patient will ambulate >5 minutes consistently all surfaces independently consistently with LRD without increase in pain in order to safely ambulate in the community In progress   STG 4 The patient will navigate 12 stairs independently with reciprocal stair pattern with LRD in order to safely get up/down from basement to perform laundry In progress     Long Term Goals Completed by 4-6 weeks Goal Status   LTG 1 The patient will report decreased low back pain </=3/10 consistently with movement in order to improve ability to perform functional mobility tasks In progress   LTG 2 The patient will improve pain free lumbar AROM >/= 10-15* in order to increase ability to perform functional mobility tasks efficiently. In progress   LTG 3 The patient will demonstrate competency with HEP to progress towards self management of symptoms upon D/C In progress   LTG 4 The patient will demonstrate improved B LE & core & lumbar strength >/=4/5 or Fair+ in order to perform functional mobility tasks with increased ease. In progress   LTG 5 The patient will have a decrease in Oswestry score >/=6 points in order to increase functional activity tolerance In progress     Plan:  Frequency/Duration:  Plan  Plan Frequency: 1-2xs/wk  Plan weeks: 4-6 weeks  Current Treatment Recommendations: Strengthening,ROM,Balance training,Gait training,Stair training,Functional mobility training,Neuromuscular re-education,Manual Therapy - Soft Tissue Mobilization,Manual Therapy - Joint Manipulation,Endurance training,Safety education & training,Patient/Caregiver education & training,Therapeutic activities,Aquatics,Positioning,Modalities,Home exercise program,Pain management  Pt to continue current HEP.   See objective section for any therapeutic exercise changes, additions or modifications this date.     Therapy Time:      PT Individual Minutes  Time In: 5415  Time Out: 1510  Minutes: 54  Timed Code Treatment Minutes: 44 Minutes  Procedure Minutes: 10 min ()   Timed Activity Minutes Units   Ther Ex 44 3     Electronically signed by Iliana Downs PTA on 6/17/22 at 3:22 PM EDT

## 2022-06-22 ENCOUNTER — HOSPITAL ENCOUNTER (OUTPATIENT)
Dept: PHYSICAL THERAPY | Age: 67
Setting detail: THERAPIES SERIES
Discharge: HOME | End: 2022-06-22
Payer: MEDICARE

## 2022-06-22 ENCOUNTER — PATIENT MESSAGE (OUTPATIENT)
Dept: PULMONOLOGY | Age: 67
End: 2022-06-22

## 2022-06-22 PROCEDURE — 97110 THERAPEUTIC EXERCISES: CPT

## 2022-06-22 PROCEDURE — 97140 MANUAL THERAPY 1/> REGIONS: CPT

## 2022-06-22 ASSESSMENT — PAIN SCALES - GENERAL: PAINLEVEL_OUTOF10: 3

## 2022-06-22 NOTE — TELEPHONE ENCOUNTER
From: Joey Frederick  To: Dr. May Moe: 6/22/2022 4:58 AM EDT  Subject: New Cpap Machine    Can you order me a new machine now that the second sleep study is done? Contact me with details.

## 2022-06-22 NOTE — TELEPHONE ENCOUNTER
Ordered and sending to [facility] notified patient and made aware of 2L bleed and that O2 will be delivered first

## 2022-06-22 NOTE — PROGRESS NOTES
[facility]  Outpatient Physical Therapy    Treatment Note        Date: 2022  Patient: Mert Erwin  :    Confirmed: Yes  MRN: [id number]  Referring Provider: Allyn Henry MD   Secondary Referring Provider (If applicable):     Medical Diagnosis: Spondylosis without myelopathy or radiculopathy, lumbar region [M47.816]    Treatment Diagnosis: increased low back pain, decreased lumbar AROM, decreased core & lumbar & bilateral LE strength, decreased functional endurance & ability to perform functional mobility tasks & ADLs, & impaired postural awareness    Visit Information:  Insurance: Payor: Doloresvalentín Osler / Plan: MEDICARE PART A AND B / Product Type: *No Product type* /   PT Visit Information  Onset Date:  (~years)  Total # of Visits Approved: 99  Total # of Visits to Date: 4  Plan of Care/Certification Expiration Date: 22  No Show: 0  Progress Note Due Date: 22  Canceled Appointment: 0  Progress Note Counter: - (PN due 22)    Subjective Information:  Subjective: Pt reports feels like therapy is helping. 3/10 LBP dull. HEP Compliance:  [x] Good [] Fair [] Poor [] Reports not doing due to:    Pain Screening  Patient Currently in Pain: Yes  Pain Level: 3    Treatment:  Exercises:  Exercises  Exercise 2: lat pull downs/triceps extension with RTB, 1 set x 20 reps x 5 second hold  Exercise 3: hamstring stretch 30 sec x 3 [place]. seated w/ step stool, standing GS stretch w/ SB 3x30\"  Exercise 4: ITB stretch 20 sec x 3  Exercise 5: SLR TA x 15 [place].(Quad lag right)  Exercise 7: Standing sink exs: x 10  Exercise 8: SF L 2.3 x 5 minutes  Exercise 10: H/L Abd & Add Ball & RTB x 25  Exercise 20: HEP: continue current, Sink ex. Manual:   Manual Therapy  Manual Traction: Leg pulls 30 sec x 5 Good results  Soft Tissue Mobilizaton: PT declined prone position.        Modalities:  Moist Heat (CPT 24920)  Patient Position: Seated  Number Minutes Moist Heat: 10 min  Moist heat location: Low back  Post treatment skin assessment: Redness - no adverse reaction       *Indicates exercise, modality, or manual techniques to be initiated when appropriate    Objective Measures:      Strength: [x] NT  [] MMT completed:     ROM: [x] NT  [] ROM measurements:     Assessment: Body Structures, Functions, Activity Limitations Requiring Skilled Therapeutic Intervention: Decreased functional mobility ,Decreased ADL status,Decreased ROM,Decreased strength,Decreased endurance,Increased pain,Decreased posture  Assessment: Continues to progress current exercises with focus on LE & core strengthening. Decreased Quad lad noted with SLR's today. added sink exewrdcises with good tolerance, increased UE assist noted. Concluded with HP to decrease tightness. Treatment Diagnosis: increased low back pain, decreased lumbar AROM, decreased core & lumbar & bilateral LE strength, decreased functional endurance & ability to perform functional mobility tasks & ADLs, & impaired postural awareness             Post-Pain Assessment:       Pain Rating (0-10 pain scale):  0 /10   Location and pain description same as pre-treatment unless indicated. Action: [] NA   [x] Perform HEP  [] Meds as prescribed  [] Modalities as prescribed   [] Call Physician     GOALS   Patient Goal(s): Patient goals : \"decrease pain\"    Short Term Goals Completed by 2-4 weeks Goal Status   STG 1 The patient will demonstrate improved postural awareness requiring <25% verbal cueing during functional mobility tasks & exercises In progress   STG 2 The patient will self-report >10 minutes consistently standing activity tolerance without increased pain in order to demonstrate improved functional endurance.  In progress   STG 3 The patient will ambulate >5 minutes consistently all surfaces independently consistently with LRD without increase in pain in order to safely ambulate in the community In progress   STG 4 The patient will navigate 12 stairs independently with reciprocal stair pattern with LRD in order to safely get up/down from basement to perform laundry In progress       Long Term Goals Completed by 4-6 weeks Goal Status   LTG 1 The patient will report decreased low back pain </=3/10 consistently with movement in order to improve ability to perform functional mobility tasks In progress   LTG 2 The patient will improve pain free lumbar AROM >/= 10-15* in order to increase ability to perform functional mobility tasks efficiently. In progress   LTG 3 The patient will demonstrate competency with HEP to progress towards self management of symptoms upon D/C In progress   LTG 4 The patient will demonstrate improved B LE & core & lumbar strength >/=4/5 or Fair+ in order to perform functional mobility tasks with increased ease. In progress   LTG 5 The patient will have a decrease in Oswestry score >/=6 points in order to increase functional activity tolerance In progress          Plan:  Frequency/Duration:  Plan  Plan Frequency: 1-2xs/wk  Plan weeks: 4-6 weeks  Current Treatment Recommendations: Strengthening,ROM,Balance training,Gait training,Stair training,Functional mobility training,Neuromuscular re-education,Manual Therapy - Soft Tissue Mobilization,Manual Therapy - Joint Manipulation,Endurance training,Safety education & training,Patient/Caregiver education & training,Therapeutic activities,Aquatics,Positioning,Modalities,Home exercise program,Pain management  Pt to continue current HEP. See objective section for any therapeutic exercise changes, additions or modifications this date.     Therapy Time:      PT Individual Minutes  Time In: 9804  Time Out: 1476  Minutes: 50  Timed Code Treatment Minutes: 38 Minutes  Procedure Minutes:HP 10    Timed Activity Minutes Units   Ther Ex 34 2   Manual  4 1     Electronically signed by Tamara Moore PTA on 6/22/22 at 1:38 PM EDT

## 2022-06-24 ENCOUNTER — HOSPITAL ENCOUNTER (OUTPATIENT)
Dept: PHYSICAL THERAPY | Age: 67
Setting detail: THERAPIES SERIES
Discharge: HOME | End: 2022-06-24
Payer: MEDICARE

## 2022-06-24 PROCEDURE — 97110 THERAPEUTIC EXERCISES: CPT

## 2022-06-24 ASSESSMENT — PAIN SCALES - GENERAL: PAINLEVEL_OUTOF10: 3

## 2022-06-24 NOTE — PROGRESS NOTES
[facility]  Outpatient Physical Therapy    Treatment Note        Date: 2022  Patient: Debbie Robbins  : [birth year]   Confirmed: Yes  MRN: [id number]  Referring Provider: Brooke Vines MD   Secondary Referring Provider (If applicable):     Medical Diagnosis: Spondylosis without myelopathy or radiculopathy, lumbar region [M47.816]    Treatment Diagnosis: increased low back pain, decreased lumbar AROM, decreased core & lumbar & bilateral LE strength, decreased functional endurance & ability to perform functional mobility tasks & ADLs, & impaired postural awareness    Visit Information:  Insurance: Payor: Kandace Lopez / Plan: MEDICARE PART A AND B / Product Type: *No Product type* /   PT Visit Information  Onset Date:  (~years)  Total # of Visits Approved: 99  Total # of Visits to Date: 5  Plan of Care/Certification Expiration Date: 22  No Show: 0  Progress Note Due Date: 22  Canceled Appointment: 0  Progress Note Counter: - (PN due 22)    Subjective Information:  Subjective: patient reports that he feels like things \"are coming along slowly\" - has noticed some improvement in his symptoms. HEP Compliance:  [x] Good [] Fair [] Poor [] Reports not doing due to:    Pain Screening  Pain Level: 3    Treatment:  Exercises:  Exercises  Exercise 1: lateral side-stepping YTB above knee x5 laps  Exercise 6: SLR 4-way YTB x10 each direction (UE support) kelvin  Exercise 8: NuStep L3 x 5 minutes - no UE use  Exercise 11: fwd stepping over small hurdles x5 laps  Exercise 20: HEP: continue current, Sink ex.     Modalities:  Moist Heat (CPT 30698)  Patient Position: Seated  Number Minutes Moist Heat: 10 min  Moist heat location: Low back  Post treatment skin assessment: Redness - no adverse reaction       *Indicates exercise, modality, or manual techniques to be initiated when appropriate    Objective Measures:     Strength: [x] NT  [] MMT completed:     ROM: [x] NT  [] ROM measurements: Assessment: Body Structures, Functions, Activity Limitations Requiring Skilled Therapeutic Intervention: Decreased functional mobility ,Decreased ADL status,Decreased ROM,Decreased strength,Decreased endurance,Increased pain,Decreased posture  Assessment: Patient was able to tolerate progression of standing hip and low back exercises. He required seated rest breaks d/t decreased endurance and RAÚL UE support d/t decreased stability. Continue to progress POC to further improve deficits to increase QOL. Treatment Diagnosis: increased low back pain, decreased lumbar AROM, decreased core & lumbar & bilateral LE strength, decreased functional endurance & ability to perform functional mobility tasks & ADLs, & impaired postural awareness             Post-Pain Assessment:        Pain Rating (0-10 pain scale):   0/10   Location and pain description same as pre-treatment unless indicated. Action: [x] NA   [] Perform HEP  [] Meds as prescribed  [] Modalities as prescribed   [] Call Physician     GOALS   Patient Goal(s): Patient goals : \"decrease pain\"    Short Term Goals Completed by 2-4 weeks Goal Status   STG 1 The patient will demonstrate improved postural awareness requiring <25% verbal cueing during functional mobility tasks & exercises In progress   STG 2 The patient will self-report >10 minutes consistently standing activity tolerance without increased pain in order to demonstrate improved functional endurance.  In progress   STG 3 The patient will ambulate >5 minutes consistently all surfaces independently consistently with LRD without increase in pain in order to safely ambulate in the community In progress   STG 4 The patient will navigate 12 stairs independently with reciprocal stair pattern with LRD in order to safely get up/down from basement to perform laundry In progress   STG 5           Long Term Goals Completed by 4-6 weeks Goal Status   LTG 1 The patient will report decreased low back pain </=3/10 consistently with movement in order to improve ability to perform functional mobility tasks In progress   LTG 2 The patient will improve pain free lumbar AROM >/= 10-15* in order to increase ability to perform functional mobility tasks efficiently. In progress   LTG 3 The patient will demonstrate competency with HEP to progress towards self management of symptoms upon D/C In progress   LTG 4 The patient will demonstrate improved B LE & core & lumbar strength >/=4/5 or Fair+ in order to perform functional mobility tasks with increased ease. In progress   LTG 5 The patient will have a decrease in Oswestry score >/=6 points in order to increase functional activity tolerance In progress   LTG 6               Plan:  Frequency/Duration:  Plan  Plan Frequency: 1-2xs/wk  Plan weeks: 4-6 weeks  Current Treatment Recommendations: Strengthening,ROM,Balance training,Gait training,Stair training,Functional mobility training,Neuromuscular re-education,Manual Therapy - Soft Tissue Mobilization,Manual Therapy - Joint Manipulation,Endurance training,Safety education & training,Patient/Caregiver education & training,Therapeutic activities,Aquatics,Positioning,Modalities,Home exercise program,Pain management  Pt to continue current HEP. See objective section for any therapeutic exercise changes, additions or modifications this date.     Therapy Time:      PT Individual Minutes  Time In: 8792  Time Out: 1423  Minutes: 48  Timed Code Treatment Minutes: 38 Minutes  Procedure Minutes: 10 HP  Timed Activity Minutes Units   Ther Ex 38 3     Electronically signed by Diann Rubio PT on 6/24/22 at 2:12 PM EDT

## 2022-06-29 ENCOUNTER — HOSPITAL ENCOUNTER (OUTPATIENT)
Dept: PHYSICAL THERAPY | Age: 67
Setting detail: THERAPIES SERIES
Discharge: HOME | End: 2022-06-29
Payer: MEDICARE

## 2022-06-29 PROCEDURE — 97110 THERAPEUTIC EXERCISES: CPT

## 2022-06-29 ASSESSMENT — PAIN DESCRIPTION - ORIENTATION: ORIENTATION: LEFT;LOWER

## 2022-06-29 ASSESSMENT — PAIN DESCRIPTION - DESCRIPTORS: DESCRIPTORS: TIGHTNESS

## 2022-06-29 ASSESSMENT — PAIN SCALES - GENERAL: PAINLEVEL_OUTOF10: 3

## 2022-06-29 ASSESSMENT — PAIN DESCRIPTION - PAIN TYPE: TYPE: CHRONIC PAIN

## 2022-06-29 ASSESSMENT — PAIN DESCRIPTION - LOCATION: LOCATION: BACK

## 2022-06-29 NOTE — PROGRESS NOTES
[facility]  Outpatient Physical Therapy    Treatment Note        Date: 2022  Patient: Tin Eaton  :    Confirmed: Yes  MRN: [id number]  Referring Provider: Wilfredo Casarez MD   Secondary Referring Provider (If applicable):     Medical Diagnosis: Spondylosis without myelopathy or radiculopathy, lumbar region [M47.816]    Treatment Diagnosis: increased low back pain, decreased lumbar AROM, decreased core & lumbar & bilateral LE strength, decreased functional endurance & ability to perform functional mobility tasks & ADLs, & impaired postural awareness    Visit Information:  Insurance: Payor: Asmita Hernández / Plan: MEDICARE PART A AND B / Product Type: *No Product type* /   PT Visit Information  Onset Date:  (~years)  Total # of Visits Approved: 99  Total # of Visits to Date: 6  Plan of Care/Certification Expiration Date: 22  No Show: 0  Progress Note Due Date: 22  Canceled Appointment: 0  Progress Note Counter: - (PN due 22)    Subjective Information:  Subjective: Patient reports feeling \"sore\" after last session, but no pain. Patient reports noticing improved functional endurance, >2-3 minutes.   HEP Compliance:  [x] Good [] Fair [] Poor [] Reports not doing due to:    Pain Screening  Patient Currently in Pain: Yes  Pain Assessment: 0-10  Pain Level: 3  Pain Type: Chronic pain  Pain Location: Back  Pain Orientation: Left,Lower  Pain Descriptors: Tightness    Treatment:  Exercises:  Exercises  Exercise 8: NuStep L3 x 6 minutes - no UE use  Exercise 12: S/L hip abduction/adduction, 2 sets x 10 reps each exercise each LE  Exercise 13: supine bridges with TA activation, 2 sets x 10 reps x 5 second hold  Exercise 20: HEP: continue with current + S/L hip exercises & bridges    Modalities:  Moist Heat (CPT 85653)  Patient Position: Seated  Number Minutes Moist Heat: 10 min  Moist heat location: Low back  Post treatment skin assessment: Redness - no adverse reaction *Indicates exercise, modality, or manual techniques to be initiated when appropriate    Objective Measures:     Strength: [x] NT  [] MMT completed:    ROM: [x] NT  [] ROM measurements:    Assessment: Body Structures, Functions, Activity Limitations Requiring Skilled Therapeutic Intervention: Decreased functional mobility ,Decreased ADL status,Decreased ROM,Decreased strength,Decreased endurance,Increased pain,Decreased posture  Assessment: Addition of bilateral hip abduction/adduction in side-lying as well as supine bridges with no increased pain just fatigue as exercises progressed. Continues to demonstrate impaired endurance as extended rest breaks are required between all exercises & sets. Self-reported improved functional endurance with standing activities at home. Concluded with HP for pain relief/muscle relief post-treatment. Treatment Diagnosis: increased low back pain, decreased lumbar AROM, decreased core & lumbar & bilateral LE strength, decreased functional endurance & ability to perform functional mobility tasks & ADLs, & impaired postural awareness    Post-Pain Assessment:       Pain Rating (0-10 pain scale):  0 /10   Location and pain description same as pre-treatment unless indicated. Action: [] NA   [x] Perform HEP  [] Meds as prescribed  [x] Modalities as prescribed   [] Call Physician     GOALS   Patient Goal(s): Patient goals : \"decrease pain\"    Short Term Goals Completed by 2-4 weeks Goal Status   STG 1 The patient will demonstrate improved postural awareness requiring <25% verbal cueing during functional mobility tasks & exercises In progress   STG 2 The patient will self-report >10 minutes consistently standing activity tolerance without increased pain in order to demonstrate improved functional endurance.  In progress   STG 3 The patient will ambulate >5 minutes consistently all surfaces independently consistently with LRD without increase in pain in order to safely ambulate in the community In progress   STG 4 The patient will navigate 12 stairs independently with reciprocal stair pattern with LRD in order to safely get up/down from basement to perform laundry In progress     Long Term Goals Completed by 4-6 weeks Goal Status   LTG 1 The patient will report decreased low back pain </=3/10 consistently with movement in order to improve ability to perform functional mobility tasks In progress   LTG 2 The patient will improve pain free lumbar AROM >/= 10-15* in order to increase ability to perform functional mobility tasks efficiently. In progress   LTG 3 The patient will demonstrate competency with HEP to progress towards self management of symptoms upon D/C In progress   LTG 4 The patient will demonstrate improved B LE & core & lumbar strength >/=4/5 or Fair+ in order to perform functional mobility tasks with increased ease. In progress   LTG 5 The patient will have a decrease in Oswestry score >/=6 points in order to increase functional activity tolerance In progress     Plan:  Frequency/Duration:  Plan  Plan Frequency: 1-2xs/wk  Plan weeks: 4-6 weeks  Current Treatment Recommendations: Strengthening,ROM,Balance training,Gait training,Stair training,Functional mobility training,Neuromuscular re-education,Manual Therapy - Soft Tissue Mobilization,Manual Therapy - Joint Manipulation,Endurance training,Safety education & training,Patient/Caregiver education & training,Therapeutic activities,Aquatics,Positioning,Modalities,Home exercise program,Pain management  Pt to continue current HEP. See objective section for any therapeutic exercise changes, additions or modifications this date.     Therapy Time:      PT Individual Minutes  Time In: 1120  Time Out: 1335  Minutes: 40  Timed Code Treatment Minutes: 40 Minutes  Procedure Minutes: 0 minutes  Timed Activity Minutes Units   Ther Ex 40 3     Electronically signed by Chago Vo PT on 6/29/22 at 1:52 PM EDT

## 2022-07-01 ENCOUNTER — HOSPITAL ENCOUNTER (OUTPATIENT)
Dept: PHYSICAL THERAPY | Age: 67
Setting detail: THERAPIES SERIES
Discharge: HOME | End: 2022-07-01
Payer: MEDICARE

## 2022-07-01 PROCEDURE — 97110 THERAPEUTIC EXERCISES: CPT

## 2022-07-01 ASSESSMENT — PAIN DESCRIPTION - PAIN TYPE: TYPE: CHRONIC PAIN

## 2022-07-01 ASSESSMENT — PAIN DESCRIPTION - LOCATION: LOCATION: BACK

## 2022-07-01 ASSESSMENT — PAIN DESCRIPTION - ORIENTATION: ORIENTATION: LEFT;LOWER

## 2022-07-01 ASSESSMENT — PAIN SCALES - GENERAL: PAINLEVEL_OUTOF10: 3

## 2022-07-01 NOTE — PROGRESS NOTES
SAQ,  Exercise 20: HEP: continue with current + S/L hip exercises & bridges    Modalities:  Moist Heat (CPT 34895)  Patient Position: Seated  Number Minutes Moist Heat: 10 min  Moist heat location: Low back  Post treatment skin assessment: Redness - no adverse reaction     *Indicates exercise, modality, or manual techniques to be initiated when appropriate    Objective Measures:     Strength: [x] NT  [] MMT completed:    ROM: [x] NT  [] ROM measurements:    Assessment: Body Structures, Functions, Activity Limitations Requiring Skilled Therapeutic Intervention: Decreased functional mobility ,Decreased ADL status,Decreased ROM,Decreased strength,Decreased endurance,Increased pain,Decreased posture  Assessment: Addition of SAQ & LAQ exercises for continued bilateral LE strengthening/endurance with no increased pain just fatigue as exercises progressed. Continues to demonstrate impaired endurance as extended rest breaks are required between all exercises & sets. Continued to self-report improved functional endurance with standing activities at home. Concluded with HP for pain relief/muscle relief post-treatment. Treatment Diagnosis: increased low back pain, decreased lumbar AROM, decreased core & lumbar & bilateral LE strength, decreased functional endurance & ability to perform functional mobility tasks & ADLs, & impaired postural awareness    Post-Pain Assessment:       Pain Rating (0-10 pain scale):  0 /10   Location and pain description same as pre-treatment unless indicated.    Action: [] NA   [x] Perform HEP  [] Meds as prescribed  [x] Modalities as prescribed   [] Call Physician     GOALS   Patient Goal(s): Patient goals : \"decrease pain\"    Short Term Goals Completed by 2-4 weeks Goal Status   STG 1 The patient will demonstrate improved postural awareness requiring <25% verbal cueing during functional mobility tasks & exercises In progress   STG 2 The patient will self-report >10 minutes consistently standing activity tolerance without increased pain in order to demonstrate improved functional endurance. In progress   STG 3 The patient will ambulate >5 minutes consistently all surfaces independently consistently with LRD without increase in pain in order to safely ambulate in the community In progress   STG 4 The patient will navigate 12 stairs independently with reciprocal stair pattern with LRD in order to safely get up/down from basement to perform laundry In progress     Long Term Goals Completed by 4-6 weeks Goal Status   LTG 1 The patient will report decreased low back pain </=3/10 consistently with movement in order to improve ability to perform functional mobility tasks In progress   LTG 2 The patient will improve pain free lumbar AROM >/= 10-15* in order to increase ability to perform functional mobility tasks efficiently. In progress   LTG 3 The patient will demonstrate competency with HEP to progress towards self management of symptoms upon D/C In progress   LTG 4 The patient will demonstrate improved B LE & core & lumbar strength >/=4/5 or Fair+ in order to perform functional mobility tasks with increased ease. In progress   LTG 5 The patient will have a decrease in Oswestry score >/=6 points in order to increase functional activity tolerance In progress     Plan:  Frequency/Duration:  Plan  Plan Frequency: 1-2xs/wk  Plan weeks: 4-6 weeks  Current Treatment Recommendations: Strengthening,ROM,Balance training,Gait training,Stair training,Functional mobility training,Neuromuscular re-education,Manual Therapy - Soft Tissue Mobilization,Manual Therapy - Joint Manipulation,Endurance training,Safety education & training,Patient/Caregiver education & training,Therapeutic activities,Aquatics,Positioning,Modalities,Home exercise program,Pain management  Pt to continue current HEP. See objective section for any therapeutic exercise changes, additions or modifications this date.     Therapy Time:      PT Individual Minutes  Time In: 1340  Time Out: 1430  Minutes: 50  Timed Code Treatment Minutes: 40 Minutes  Procedure Minutes:10 minutes HP  Timed Activity Minutes Units   Ther Ex 40 3     Electronically signed by Jacquie Deshpande PT on 7/1/22 at 2:27 PM EDT

## 2022-07-06 ENCOUNTER — HOSPITAL ENCOUNTER (OUTPATIENT)
Dept: PHYSICAL THERAPY | Age: 67
Setting detail: THERAPIES SERIES
Discharge: HOME | End: 2022-07-06
Payer: MEDICARE

## 2022-07-06 PROCEDURE — 97110 THERAPEUTIC EXERCISES: CPT

## 2022-07-06 ASSESSMENT — PAIN DESCRIPTION - DESCRIPTORS: DESCRIPTORS: ACHING;TIGHTNESS

## 2022-07-06 ASSESSMENT — PAIN SCALES - GENERAL: PAINLEVEL_OUTOF10: 2

## 2022-07-06 ASSESSMENT — PAIN DESCRIPTION - ORIENTATION: ORIENTATION: LOWER

## 2022-07-06 ASSESSMENT — PAIN DESCRIPTION - FREQUENCY: FREQUENCY: CONTINUOUS

## 2022-07-06 ASSESSMENT — PAIN DESCRIPTION - LOCATION: LOCATION: BACK

## 2022-07-06 ASSESSMENT — PAIN DESCRIPTION - PAIN TYPE: TYPE: CHRONIC PAIN

## 2022-07-06 NOTE — PROGRESS NOTES
[facility]  Outpatient Physical Therapy    Treatment Note        Date: 2022  Patient: Leno Byrne  : 1955   Confirmed: Yes  MRN: [id number]  Referring Provider: Chio Ivey MD   Secondary Referring Provider (If applicable):     Medical Diagnosis: Spondylosis without myelopathy or radiculopathy, lumbar region [M47.816]    Treatment Diagnosis: increased low back pain, decreased lumbar AROM, decreased core & lumbar & bilateral LE strength, decreased functional endurance & ability to perform functional mobility tasks & ADLs, & impaired postural awareness    Visit Information:  Insurance: Payor: Parrish Forrestlambert / Plan: MEDICARE PART A AND B / Product Type: *No Product type* /   PT Visit Information  Onset Date:  (~years)  Total # of Visits Approved: 99  Total # of Visits to Date: 7  Plan of Care/Certification Expiration Date: 22  No Show: 0  Progress Note Due Date: 22  Canceled Appointment: 0  Progress Note Counter: - (PN due 22)    Subjective Information:  Subjective: Patient states he is limited to 3-4 minutes of standing activity before needing to sit and rest. Feels this is slightly improved since starting therapy. Notes his low back becomes \"tight\" and then pain becomes sharp.   HEP Compliance:  [] Good [] Fair [] Poor [] Reports not doing due to:    Pain Screening  Patient Currently in Pain: Yes  Pain Assessment: 0-10  Pain Level: 2  Pain Type: Chronic pain  Pain Location: Back  Pain Orientation: Lower  Pain Descriptors: Aching,Tightness  Pain Frequency: Continuous    Treatment:  Exercises:  Exercises  Exercise 8: SCI FIT L3 x 6 minutes - no UE use  Exercise 9: hamstring stretch 3x30\"  Exercise 10: timed ambulation: 2 minutes 47 seconds  Exercise 13: supine bridges with TA activation, 2 sets x 10 reps x 5 second hold  Exercise 16: dead bug, supine, alternating arms/legs/opposite arm & leg, 2 sets x 15 reps   Modalities:  Moist Heat (CPT 43420)  Patient Position: Seated  Number Minutes Moist Heat: 10 min  Moist heat location: Low back  Post treatment skin assessment: Redness - no adverse reaction  *Indicates exercise, modality, or manual techniques to be initiated when appropriate  Assessment: Body Structures, Functions, Activity Limitations Requiring Skilled Therapeutic Intervention: Decreased functional mobility ,Decreased ADL status,Decreased ROM,Decreased strength,Decreased endurance,Increased pain,Decreased posture  Assessment: continued current POC for bilateral LE/core strengthening to improve standing/ambulation tolerance. Patient ambulates 2 minutes 47 seconds throughout clinic. Ability to ambulate further is limited by increased pain/tightness. Provided seated hamstring stretch for HEP. Patient verbalizes good understanding. Treatment Diagnosis: increased low back pain, decreased lumbar AROM, decreased core & lumbar & bilateral LE strength, decreased functional endurance & ability to perform functional mobility tasks & ADLs, & impaired postural awareness    Post-Pain Assessment:       Pain Rating (0-10 pain scale):  0 /10   Location and pain description same as pre-treatment unless indicated. Action: [x] NA   [] Perform HEP  [] Meds as prescribed  [] Modalities as prescribed   [] Call Physician     GOALS   Patient Goal(s): Patient goals : \"decrease pain\"    Short Term Goals Completed by 2-4 weeks Goal Status   STG 1 The patient will demonstrate improved postural awareness requiring <25% verbal cueing during functional mobility tasks & exercises In progress   STG 2 The patient will self-report >10 minutes consistently standing activity tolerance without increased pain in order to demonstrate improved functional endurance.  In progress   STG 3 The patient will ambulate >5 minutes consistently all surfaces independently consistently with LRD without increase in pain in order to safely ambulate in the community In progress   STG 4 The patient will navigate 12 stairs independently with reciprocal stair pattern with LRD in order to safely get up/down from basement to perform laundry In progress   STG 5           Long Term Goals Completed by 4-6 weeks Goal Status   LTG 1 The patient will report decreased low back pain </=3/10 consistently with movement in order to improve ability to perform functional mobility tasks In progress   LTG 2 The patient will improve pain free lumbar AROM >/= 10-15* in order to increase ability to perform functional mobility tasks efficiently. In progress   LTG 3 The patient will demonstrate competency with HEP to progress towards self management of symptoms upon D/C In progress   LTG 4 The patient will demonstrate improved B LE & core & lumbar strength >/=4/5 or Fair+ in order to perform functional mobility tasks with increased ease. In progress   LTG 5 The patient will have a decrease in Oswestry score >/=6 points in order to increase functional activity tolerance In progress     Plan:  Frequency/Duration:  Plan  Plan Frequency: 1-2xs/wk  Plan weeks: 4-6 weeks  Specific Instructions for Next Treatment: PN for insurance  Current Treatment Recommendations: Strengthening,ROM,Balance training,Gait training,Stair training,Functional mobility training,Neuromuscular re-education,Manual Therapy - Soft Tissue Mobilization,Manual Therapy - Joint Manipulation,Endurance training,Safety education & training,Patient/Caregiver education & training,Therapeutic activities,Aquatics,Positioning,Modalities,Home exercise program,Pain management  Pt to continue current HEP. See objective section for any therapeutic exercise changes, additions or modifications this date.     Therapy Time:      PT Individual Minutes  Time In: 1300  Time Out: 1350  Minutes: 50  Timed Code Treatment Minutes: 40 Minutes  Procedure Minutes:10  Timed Activity Minutes Units   Ther Ex 40 3     Electronically signed by Shay Brewster PTA on 7/6/22 at 1:21 PM EDT

## 2022-07-08 ENCOUNTER — HOSPITAL ENCOUNTER (OUTPATIENT)
Dept: PHYSICAL THERAPY | Age: 67
Setting detail: THERAPIES SERIES
Discharge: HOME | End: 2022-07-08
Payer: MEDICARE

## 2022-07-08 PROCEDURE — 97110 THERAPEUTIC EXERCISES: CPT

## 2022-07-08 PROCEDURE — 97116 GAIT TRAINING THERAPY: CPT

## 2022-07-08 ASSESSMENT — PAIN DESCRIPTION - ORIENTATION: ORIENTATION: LOWER

## 2022-07-08 ASSESSMENT — PAIN DESCRIPTION - LOCATION: LOCATION: BACK

## 2022-07-08 ASSESSMENT — PAIN DESCRIPTION - DESCRIPTORS: DESCRIPTORS: TIGHTNESS;ACHING

## 2022-07-08 ASSESSMENT — PAIN SCALES - GENERAL: PAINLEVEL_OUTOF10: 2

## 2022-07-08 NOTE — PROGRESS NOTES
[facility]  Outpatient Physical Therapy    Treatment Note        Date: 2022  Patient: Malina Chavez  : [birth year]   Confirmed: Yes  MRN: [id number]  Referring Provider: Tami Meraz MD   Secondary Referring Provider (If applicable):     Medical Diagnosis: Spondylosis without myelopathy or radiculopathy, lumbar region [M47.816]    Treatment Diagnosis: increased low back pain, decreased lumbar AROM, decreased core & lumbar & bilateral LE strength, decreased functional endurance & ability to perform functional mobility tasks & ADLs, & impaired postural awareness    Visit Information:  Insurance: Payor: MEDICARE / Plan: MEDICARE PART A AND B / Product Type: *No Product type* /   PT Visit Information  Onset Date:  (~years)  Total # of Visits Approved: 99  Total # of Visits to Date: 9  Plan of Care/Certification Expiration Date: 22  No Show: 0  Progress Note Due Date: 22  Canceled Appointment: 0  Progress Note Counter: - (PN due 22)    Subjective Information:  Subjective: Pt states his back about \" his normal,Pt feels he would like to continue his therapy  HEP Compliance:  [] Good [x] Fair [] Poor [] Reports not doing due to:    Pain Screening  Patient Currently in Pain: Yes  Pain Assessment: 0-10  Pain Level: 2  Pain Location: Back  Pain Orientation: Lower  Pain Descriptors: Tightness,Aching    Treatment:  Exercises:  Exercises  Exercise 8: SCI FIT L3 x 6 minutes - no UE use  Exercise 14: sitting EOM on Alie disc,  cueing for TA , 5secs x 10 x 2  Exercise 16: seated on alie disc with alt ue/le 2 x15 , LAQ, hip flex x 10       Manual: NA          Modalities:  Moist Heat (CPT 66016)  Patient Position: Seated  Number Minutes Moist Heat: 10 min  Moist heat location: Low back  Post treatment skin assessment: Redness - no adverse reaction       *Indicates exercise, modality, or manual techniques to be initiated when appropriate    Objective Measures:      Ambulation  Distance: clinic distances         Stairs  # Steps : 12  Stairs Height: 6\"  Rails: Bilateral  Device: No Device  Assistance: Modified independent   Comment: non-reciprocal descending, reciprocal ascending    Strength: [] NT  [x] MMT completed:  Strength RLE  Comment: hip flexion = 4-/5; knee extension = 5/5; knee flexion = 4+/5/5; hip abduction = 4-/5; hip adduction = 4-/5  Strength LLE  Comment: hip flexion = 4-/5; knee extension = 5/5; knee flexion = 4+/5/5; hip abduction = 4-/5; hip adduction = 4-/5        Strength Other  Other: core = Poor+/Fair-; lumbar extension = anticipate decreased - unable to get into position    ROM: [] NT  [x] ROM measurements:      Spine  Lumbar: flexion = 75%; extension = 50-75%; left side bend = 50-75%; right side bend = 50-75%; left rotation = 50-75%; right rotation = 50-75% (no pain with motions just \"tightness\")  Spine  Lumbar: flexion = 75%; extension = 50-75%; left side bend = 50-75%; right side bend = 50-75%; left rotation = 50-75%; right rotation = 50-75% (no pain with motions just \"tightness\")     Assessment: Body Structures, Functions, Activity Limitations Requiring Skilled Therapeutic Intervention: Decreased functional mobility ,Decreased ADL status,Decreased ROM,Decreased strength,Decreased endurance,Increased pain,Decreased posture  Assessment: Pt with progressing strength and ROM per measures. Pt meeting stair goal for functional ability at home. Pt would bebnefit from continued therapy to progress strength, rom and improve functional gait quality and endurance. Treatment Diagnosis: increased low back pain, decreased lumbar AROM, decreased core & lumbar & bilateral LE strength, decreased functional endurance & ability to perform functional mobility tasks & ADLs, & impaired postural awareness     Post-Pain Assessment:       Pain Rating (0-10 pain scale):  0 /10   Location and pain description same as pre-treatment unless indicated.    Action: [x] NA   [] Perform HEP  [] Meds as prescribed  [] Modalities as prescribed   [] Call Physician     GOALS   Patient Goal(s): Patient goals : \"decrease pain\"    Short Term Goals Completed by 2-4 weeks Goal Status   STG 1 The patient will demonstrate improved postural awareness requiring <25% verbal cueing during functional mobility tasks & exercises In progress   STG 2 The patient will self-report >10 minutes consistently standing activity tolerance without increased pain in order to demonstrate improved functional endurance. In progress   STG 3 The patient will ambulate >5 minutes consistently all surfaces independently consistently with LRD without increase in pain in order to safely ambulate in the community In progress   STG 4 The patient will navigate 12 stairs independently with reciprocal stair pattern with LRD in order to safely get up/down from basement to perform laundry Met   STG 5           Long Term Goals Completed by 4-6 weeks Goal Status   LTG 1 The patient will report decreased low back pain </=3/10 consistently with movement in order to improve ability to perform functional mobility tasks In progress   LTG 2 The patient will improve pain free lumbar AROM >/= 10-15* in order to increase ability to perform functional mobility tasks efficiently. In progress   LTG 3 The patient will demonstrate competency with HEP to progress towards self management of symptoms upon D/C In progress   LTG 4 The patient will demonstrate improved B LE & core & lumbar strength >/=4/5 or Fair+ in order to perform functional mobility tasks with increased ease.  In progress   LTG 5 The patient will have a decrease in Oswestry score >/=6 points in order to increase functional activity tolerance In progress   LTG 6               Plan:  Frequency/Duration:  Plan  Plan Frequency: 1-2xs/wk  Plan weeks: 4-6 weeks  Current Treatment Recommendations: Strengthening,ROM,Balance training,Gait training,Stair training,Functional mobility training,Neuromuscular

## 2022-07-08 NOTE — PROGRESS NOTES
[address]     Ph: [phone]  Fax: [phone]      [] Certification  [] Recertification []  Plan of Care  [x] Progress Note [] Discharge      Referring Provider: Josi Quach MD   From:  Anny Hernandez, PT, DPT  Patient: Corrinne Manis (63 y.o. male) : 1955 Date: 2022   Medical Diagnosis: Spondylosis without myelopathy or radiculopathy, lumbar region [M47.816]    Treatment Diagnosis: increased low back pain, decreased lumbar AROM, decreased core & lumbar & bilateral LE strength, decreased functional endurance & ability to perform functional mobility tasks & ADLs, & impaired postural awareness    Plan of Care/Certification Expiration Date: : 22   Progress Report Period from:  2022  to 2022    Visits to Date: 9 No Show: 0 Cancelled Appts: 0    OBJECTIVE:   Short Term Goals - Time Frame for Short term goals: 2-4 weeks    Goals Current/Discharge status  Status   Short term goal 1: The patient will demonstrate improved postural awareness requiring <25% verbal cueing during functional mobility tasks & exercises  Pt requires ongoing education  In progress   Short term goal 2: The patient will self-report >10 minutes consistently standing activity tolerance without increased pain in order to demonstrate improved functional endurance.   Pt reports he can perform approximately 5 minutes of activity before needing to sit d/t pain In progress   Short term goal 3: The patient will ambulate >5 minutes consistently all surfaces independently consistently with LRD without increase in pain in order to safely ambulate in the community  Pt ambulates approximately 2 min and 47secs without AD with THOMSON and LB tightness In progress   Short term goal 4: The patient will navigate 12 stairs independently with reciprocal stair pattern with LRD in order to safely get up/down from basement to perform laundry  Stairs  # Steps : 12  Stairs Height: 6\"  Rails: Bilateral  Device: No Device  Assistance: Modified independent   Comment: non-reciprocal descending, reciprocal ascending   Met            Long Term Goals - Time Frame for Long term goals : 4-6 weeks  Goals Current/ Discharge status Status   Long term goal 1: The patient will report decreased low back pain </=3/10 consistently with movement in order to improve ability to perform functional mobility tasks Pt  Reports consistantly 2/10, however when pain increases it can be a 8-9/10 In progress   Long term goal 2: The patient will improve pain free lumbar AROM >/= 10-15* in order to increase ability to perform functional mobility tasks efficiently. Spine  Lumbar: flexion = 75%; extension = 50-75%; left side bend = 50-75%; right side bend = 50-75%; left rotation = 50-75%; right rotation = 50-75% (no pain with motions just \"tightness\")   In progress   Long term goal 3: The patient will demonstrate competency with HEP to progress towards self management of symptoms upon D/C Ongoing HEP for progressions In progress   Long term goal 4: The patient will demonstrate improved B LE & core & lumbar strength >/=4/5 or Fair+ in order to perform functional mobility tasks with increased ease. Strength LLE  Comment: hip flexion = 4-/5; knee extension = 5/5; knee flexion = 4+/5/5; hip abduction = 4-/5; hip adduction = 4-/5  Strength RLE  Comment: hip flexion = 4-/5; knee extension = 5/5; knee flexion = 4+/5/5; hip abduction = 4-/5; hip adduction = 4-/5         Other: core = Poor+/Fair-; lumbar extension = anticipate decreased - unable to get into position In progress   Long term goal 5:  The patient will have a decrease in Oswestry score >/=6 points in order to increase functional activity tolerance 3 point decrease  In progress     Body Structures, Functions, Activity Limitations Requiring Skilled Therapeutic Intervention: Decreased functional mobility ,Decreased ADL status,Decreased ROM,Decreased strength,Decreased endurance,Increased pain,Decreased posture    Assessment: Patient is a 78year old male who presented on 6/8/22 with chronic low back pain that intermittently radiates down LLE that has been on-going for years prior who has participated in 9 outpatient PT sessions 6/8/22-7/8/22. Pt with progressing strength and ROM per measures. Pt meeting stair goal for functional ability at home. Pt would benefit from continued therapy to progress strength, rom and improve functional gait quality and endurance. PLAN: [x] Evaluate and Treat  Frequency/Duration:  Plan Frequency: 1-2xs/wk  Plan weeks: 4-6 weeks  Current Treatment Recommendations: Strengthening,ROM,Balance training,Gait training,Stair training,Functional mobility training,Neuromuscular re-education,Manual Therapy - Soft Tissue Mobilization,Manual Therapy - Joint Manipulation,Endurance training,Safety education & training,Patient/Caregiver education & training,Therapeutic activities,Aquatics,Positioning,Modalities,Home exercise program,Pain management     Precautions:       arthritis, kidney disease, high blood pressure                     Patient Status:[x] Continue/ Initiate plan of Care    [] Discharge PT. Recommend pt continue with HEP. [x] Additional visits requested, Please re-certify for additional visits: 1-2xs/wk for 4-6 additional weeks    [] Hold         Signature:Obj Info By: Electronically signed by Seb Braga PTA on 7/8/22 at 1:23 PM EDT    Electronically signed by Lizzette Downs PT on 7/8/2022 at 3:51 PM      If you have any questions or concerns, please don't hesitate to call. Thank you for your referral.    I have reviewed this plan of care and certify a need for medically necessary rehabilitation services.     Physician Signature:__________________________________________________________  Date:  Please sign and return

## 2022-07-13 ENCOUNTER — HOSPITAL ENCOUNTER (OUTPATIENT)
Dept: PHYSICAL THERAPY | Age: 67
Setting detail: THERAPIES SERIES
Discharge: HOME | End: 2022-07-13
Payer: MEDICARE

## 2022-07-13 PROCEDURE — 97110 THERAPEUTIC EXERCISES: CPT

## 2022-07-13 ASSESSMENT — PAIN DESCRIPTION - ORIENTATION: ORIENTATION: LOWER

## 2022-07-13 ASSESSMENT — PAIN DESCRIPTION - FREQUENCY: FREQUENCY: CONTINUOUS

## 2022-07-13 ASSESSMENT — PAIN SCALES - GENERAL: PAINLEVEL_OUTOF10: 4

## 2022-07-13 ASSESSMENT — PAIN DESCRIPTION - ONSET: ONSET: ON-GOING

## 2022-07-13 ASSESSMENT — PAIN DESCRIPTION - PAIN TYPE: TYPE: CHRONIC PAIN

## 2022-07-13 ASSESSMENT — PAIN DESCRIPTION - DESCRIPTORS: DESCRIPTORS: TIGHTNESS;ACHING

## 2022-07-13 ASSESSMENT — PAIN DESCRIPTION - LOCATION: LOCATION: BACK

## 2022-07-13 NOTE — PROGRESS NOTES
[facility]  Outpatient Physical Therapy    Treatment Note        Date: 2022  Patient: Kennedi Davis  : 1955   Confirmed: Yes  MRN: [id number]  Referring Provider: Mendoza Cat MD   Secondary Referring Provider (If applicable):     Medical Diagnosis: Spondylosis without myelopathy or radiculopathy, lumbar region [M47.816]    Treatment Diagnosis: increased low back pain, decreased lumbar AROM, decreased core & lumbar & bilateral LE strength, decreased functional endurance & ability to perform functional mobility tasks & ADLs, & impaired postural awareness    Visit Information:  Insurance: Payor: Serjio Barrientos / Plan: MEDICARE PART A AND B / Product Type: *No Product type* /   PT Visit Information  Onset Date:  (~years)  Total # of Visits Approved: 99  Total # of Visits to Date: 10  Plan of Care/Certification Expiration Date: 22  No Show: 0  Progress Note Due Date: 22  Canceled Appointment: 0  Progress Note Counter: 10/8- (PN due 22)    Subjective Information:  Subjective: Patient reports calf tightness this date as well as continued difficulty with standing & walking endurance.   HEP Compliance:  [x] Good [] Fair [] Poor [] Reports not doing due to:    Pain Screening  Patient Currently in Pain: Yes  Pain Assessment: 0-10  Pain Level: 4  Pain Type: Chronic pain  Pain Location: Back  Pain Orientation: Lower  Pain Descriptors: Tightness,Aching  Pain Frequency: Continuous  Pain Onset: On-going    Treatment:  Exercises:  Exercises  Exercise 8: SCI FIT L3.5 x 6 minutes - no UE use, seat 8  Exercise 9: calf stretch with strap, long sitting, 1 set x 3 reps x 20-30 second hold each LE  Exercise 10: timed ambulation around track: 2 minutes 25 seconds  Exercise 11: seated unsupported core side bends with no use of UEs for increased challenge, 1 set x 10 reps each side  Exercise 12: core with weight ball*  Exercise 13: seated unsupported core diagonal chops with no use of UEs for increased challenge, 1 set x 10 reps each side  Exercise 20: HEP: continue with current + side bends & chops    Modalities:  Moist Heat (CPT 28843)  Patient Position: Seated  Number Minutes Moist Heat: 10 min  Moist heat location: Low back  Post treatment skin assessment: Redness - no adverse reaction     *Indicates exercise, modality, or manual techniques to be initiated when appropriate    Objective Measures:     Strength: [x] NT  [] MMT completed:     ROM: [x] NT  [] ROM measurements:    Assessment: Body Structures, Functions, Activity Limitations Requiring Skilled Therapeutic Intervention: Decreased functional mobility ,Decreased ADL status,Decreased ROM,Decreased strength,Decreased endurance,Increased pain,Decreased posture  Assessment: Continues to demonstrate impaired endurance as extended rest breaks are required between all exercises & sets as well as before & after ambulation trial.  Education provided on how patient's weight towards the front of his center of gravity could be affecting his posterior musculature behind center of gravity. Addition of core strengthening activities in seated position for continued strengthening as well as education for progression to standing position. Concluded with  for pain relief/muscle relief post-treatment. Treatment Diagnosis: increased low back pain, decreased lumbar AROM, decreased core & lumbar & bilateral LE strength, decreased functional endurance & ability to perform functional mobility tasks & ADLs, & impaired postural awareness    Post-Pain Assessment:       Pain Rating (0-10 pain scale):  \"better\" /10   Location and pain description same as pre-treatment unless indicated.    Action: [] NA   [x] Perform HEP  [] Meds as prescribed  [x] Modalities as prescribed   [] Call Physician     GOALS   Patient Goal(s): Patient goals : \"decrease pain\"    Short Term Goals Completed by 2-4 weeks Goal Status   STG 1 The patient will demonstrate improved postural awareness requiring <25% verbal cueing during functional mobility tasks & exercises In progress   STG 2 The patient will self-report >10 minutes consistently standing activity tolerance without increased pain in order to demonstrate improved functional endurance. In progress   STG 3 The patient will ambulate >5 minutes consistently all surfaces independently consistently with LRD without increase in pain in order to safely ambulate in the community In progress   STG 4 The patient will navigate 12 stairs independently with reciprocal stair pattern with LRD in order to safely get up/down from basement to perform laundry Met       Long Term Goals Completed by 4-6 weeks Goal Status   LTG 1 The patient will report decreased low back pain </=3/10 consistently with movement in order to improve ability to perform functional mobility tasks In progress   LTG 2 The patient will improve pain free lumbar AROM >/= 10-15* in order to increase ability to perform functional mobility tasks efficiently. In progress   LTG 3 The patient will demonstrate competency with HEP to progress towards self management of symptoms upon D/C In progress   LTG 4 The patient will demonstrate improved B LE & core & lumbar strength >/=4/5 or Fair+ in order to perform functional mobility tasks with increased ease.  In progress   LTG 5 The patient will have a decrease in Oswestry score >/=6 points in order to increase functional activity tolerance In progress     Plan:  Frequency/Duration:  Plan  Plan Frequency: 1-2xs/wk  Plan weeks: 4-6 additional weeks (up until ~8/19/22)  Current Treatment Recommendations: Strengthening,ROM,Balance training,Gait training,Stair training,Functional mobility training,Neuromuscular re-education,Manual Therapy - Soft Tissue Mobilization,Manual Therapy - Joint Manipulation,Endurance training,Safety education & training,Patient/Caregiver education & training,Therapeutic activities,Aquatics,Positioning,Modalities,Home exercise program,Pain management  Pt to continue current HEP. See objective section for any therapeutic exercise changes, additions or modifications this date.     Therapy Time:      PT Individual Minutes  Time In: 5631  Time Out: 1346  Minutes: 48  Timed Code Treatment Minutes: 38 Minutes  Procedure Minutes: 10 minutes HP  Timed Activity Minutes Units   Ther Ex 38 3     Electronically signed by Alverta Severin, PT on 7/13/22 at 1:41 PM EDT

## 2022-07-15 ENCOUNTER — HOSPITAL ENCOUNTER (OUTPATIENT)
Dept: PHYSICAL THERAPY | Age: 67
Setting detail: THERAPIES SERIES
Discharge: HOME | End: 2022-07-15
Payer: MEDICARE

## 2022-07-15 PROCEDURE — 97110 THERAPEUTIC EXERCISES: CPT

## 2022-07-15 ASSESSMENT — PAIN DESCRIPTION - PAIN TYPE: TYPE: CHRONIC PAIN

## 2022-07-15 ASSESSMENT — PAIN DESCRIPTION - LOCATION: LOCATION: BACK

## 2022-07-15 ASSESSMENT — PAIN DESCRIPTION - DESCRIPTORS: DESCRIPTORS: ACHING;TIGHTNESS

## 2022-07-15 ASSESSMENT — PAIN DESCRIPTION - ONSET: ONSET: ON-GOING

## 2022-07-15 ASSESSMENT — PAIN DESCRIPTION - ORIENTATION: ORIENTATION: LOWER

## 2022-07-15 ASSESSMENT — PAIN SCALES - GENERAL: PAINLEVEL_OUTOF10: 2

## 2022-07-15 ASSESSMENT — PAIN DESCRIPTION - FREQUENCY: FREQUENCY: CONTINUOUS

## 2022-07-15 NOTE — PROGRESS NOTES
[facility]  Outpatient Physical Therapy    Treatment Note        Date: 7/15/2022  Patient: Elizabeth Wilkins  : 1955   Confirmed: Yes  MRN: [id number]  Referring Provider: Zach Vazquez MD   Secondary Referring Provider (If applicable):     Medical Diagnosis: Spondylosis without myelopathy or radiculopathy, lumbar region [M47.816]    Treatment Diagnosis: increased low back pain, decreased lumbar AROM, decreased core & lumbar & bilateral LE strength, decreased functional endurance & ability to perform functional mobility tasks & ADLs, & impaired postural awareness    Visit Information:  Insurance: Payor: Gene Kessler / Plan: MEDICARE PART A AND B / Product Type: *No Product type* /   PT Visit Information  Onset Date:  (~years)  Total # of Visits Approved: 99  Total # of Visits to Date: 6  Plan of Care/Certification Expiration Date: 22  No Show: 0  Progress Note Due Date: 22  Canceled Appointment: 0  Progress Note Counter: - (PN due 22)    Subjective Information:  Subjective: Patient reports decreased pain today & states, \"It's a good day. \"  HEP Compliance:  [x] Good [] Fair [] Poor [] Reports not doing due to:    Pain Screening  Patient Currently in Pain: Yes  Pain Assessment: 0-10  Pain Level: 2  Pain Type: Chronic pain  Pain Location: Back  Pain Orientation: Lower  Pain Descriptors: Aching, Tightness  Pain Frequency: Continuous  Pain Onset: On-going    Treatment:  Exercises:  Exercises  Exercise 8: SCI FIT L3.5 x 6 minutes - no UE use, seat 8  Exercise 11: seated unsupported core side bends with 2# weighted ball for increased challenge, 1 set x 10 reps each side  Exercise 12: seated unsupported core rotational taps with 2# weighted ball for increased challenge, 1 set x 10 reps each side  Exercise 13: seated unsupported core diagonal chops with 2# weighted ball for increased challenge, 1 set x 10 reps each side  Exercise 14: seated unsupported core overhead with 2# weighted ball 1 The patient will demonstrate improved postural awareness requiring <25% verbal cueing during functional mobility tasks & exercises In progress   STG 2 The patient will self-report >10 minutes consistently standing activity tolerance without increased pain in order to demonstrate improved functional endurance. In progress   STG 3 The patient will ambulate >5 minutes consistently all surfaces independently consistently with LRD without increase in pain in order to safely ambulate in the community In progress   STG 4 The patient will navigate 12 stairs independently with reciprocal stair pattern with LRD in order to safely get up/down from basement to perform laundry Met     Long Term Goals Completed by 4-6 weeks Goal Status   LTG 1 The patient will report decreased low back pain </=3/10 consistently with movement in order to improve ability to perform functional mobility tasks In progress   LTG 2 The patient will improve pain free lumbar AROM >/= 10-15* in order to increase ability to perform functional mobility tasks efficiently. In progress   LTG 3 The patient will demonstrate competency with HEP to progress towards self management of symptoms upon D/C In progress   LTG 4 The patient will demonstrate improved B LE & core & lumbar strength >/=4/5 or Fair+ in order to perform functional mobility tasks with increased ease.  In progress   LTG 5 The patient will have a decrease in Oswestry score >/=6 points in order to increase functional activity tolerance In progress       Plan:  Frequency/Duration:  Plan  Plan Frequency: 1-2xs/wk  Plan weeks: 4-6 additional weeks (up until ~8/19/22)  Current Treatment Recommendations: Strengthening, ROM, Balance training, Gait training, Stair training, Functional mobility training, Neuromuscular re-education, Manual Therapy - Soft Tissue Mobilization, Manual Therapy - Joint Manipulation, Endurance training, Safety education & training, Patient/Caregiver education & training, Therapeutic activities, Aquatics, Positioning, Modalities, Home exercise program, Pain management  Pt to continue current HEP. See objective section for any therapeutic exercise changes, additions or modifications this date.     Therapy Time:      PT Individual Minutes  Time In: 1300  Time Out: 0614  Minutes: 48  Timed Code Treatment Minutes: 38 Minutes  Procedure Minutes: 10 minutes HP  Timed Activity Minutes Units   Ther Ex 38 3          Electronically signed by Elizabeth Friedman, PT on 7/15/22 at 1:44 PM EDT

## 2022-07-20 ENCOUNTER — HOSPITAL ENCOUNTER (OUTPATIENT)
Dept: PHYSICAL THERAPY | Age: 67
Setting detail: THERAPIES SERIES
Discharge: HOME | End: 2022-07-20
Payer: MEDICARE

## 2022-07-20 NOTE — PROGRESS NOTES
[address]       Physical Therapy  Cancellation/No-show Note  Patient Name:  Emmett Ramirez  :     Date:  2022     Visit Information:  PT Visit Information  Onset Date:  (~years)  Total # of Visits Approved: 99  Total # of Visits to Date: 6  Plan of Care/Certification Expiration Date: 22  No Show: 0  Progress Note Due Date: 22  Canceled Appointment: 1  Progress Note Counter: - (PN due 22) CX 22 - sick    For today's appointment patient:  [x]  Cancelled  []  Rescheduled appointment  []  No-show   []  Called pt to remind of next appointment     Reason given by patient:  [x]  Patient ill  []  Conflicting appointment  []  No transportation    []  Conflict with work  []  No reason given  []  Other:       Comments:   Patient has future visits scheduled.     Signature: Electronically signed by Miguel Wei PT on 22 at 12:16 PM EDT

## 2022-07-22 ENCOUNTER — HOSPITAL ENCOUNTER (OUTPATIENT)
Dept: PHYSICAL THERAPY | Age: 67
Setting detail: THERAPIES SERIES
Discharge: HOME | End: 2022-07-22
Payer: MEDICARE

## 2022-07-22 PROCEDURE — 97110 THERAPEUTIC EXERCISES: CPT

## 2022-07-22 NOTE — PROGRESS NOTES
[facility]  Outpatient Physical Therapy    Treatment Note        Date: 2022  Patient: Dru Mcdonald  :    Confirmed: Yes  MRN: [id number]  Referring Provider: Cathy Zamorano MD   Secondary Referring Provider (If applicable):     Medical Diagnosis: Spondylosis without myelopathy or radiculopathy, lumbar region [M47.816]    Treatment Diagnosis: increased low back pain, decreased lumbar AROM, decreased core & lumbar & bilateral LE strength, decreased functional endurance & ability to perform functional mobility tasks & ADLs, & impaired postural awareness    Visit Information:  Insurance: Payor: Bart Ruiz / Plan: MEDICARE PART A AND B / Product Type: *No Product type* /   PT Visit Information  Onset Date:  (~years)  Total # of Visits Approved: 99  Total # of Visits to Date: 15  Plan of Care/Certification Expiration Date: 22  No Show: 0  Progress Note Due Date: 22  Canceled Appointment: 1  Progress Note Counter: - (PN due 22)    Subjective Information:  Subjective: Pt  report after ~3 min of walking and performing activities around house, LBP begins to bother him requiring seated rest break to resume to task.   HEP Compliance:  [x] Good [] Fair [] Poor [] Reports not doing due to:    Pain Screening  Patient Currently in Pain: Yes  Pain Assessment: 0-10    Treatment:  Exercises:  Exercises  Exercise 4: 3 way lumbar flexion stretch over PBall 10\"x5  Exercise 5: Standing Pball walk up wall 5\"x10 (w/ focus on ext ROM and endurance)  Exercise 6: Seated SB stretch 10\"x5 b/l  Exercise 7: Step ups F/L x10 ea b/l 6\" box  Exercise 8: NS L4 x 6 minutes - no UE use, seat 8  Exercise 20: HEP: continue with current     Modalities:  Moist Heat (CPT 22962)  Patient Position: Seated  Number Minutes Moist Heat: 10 min  Moist heat location: Low back  Post treatment skin assessment: Redness - no adverse reaction     *Indicates exercise, modality, or manual techniques to be initiated when appropriate    Objective Measures:      Strength: [x] NT  [] MMT completed:    ROM: [] NT  [x] ROM measurements:  AROM LLE (degrees)  LLE AROM : WFL (limited secondary to body habitus)   AROM RLE (degrees)  RLE AROM: WFL (limited secondary to body habitus)   Spine  Lumbar: flexion = 75%; extension = 50%; left side bend = 75%; right side bend = 75% (no pain with motions just \"tightness\")     Assessment: Body Structures, Functions, Activity Limitations Requiring Skilled Therapeutic Intervention: Decreased functional mobility , Decreased ADL status, Decreased ROM, Decreased strength, Decreased endurance, Increased pain, Decreased posture  Assessment: Challenged pt w/ inc standing activity this date to work towards improving functional activity tolerance over current limitation of 3 min (per pt report). Step ups activity tolerated well given seated RB's provided as needed, roughly every 3-4 min during tx. LB reported at 3-4/10 post tx. Onging fatigue/weakness partially relative to recieving dialysis treatments 3 days per wk (5 hr, 15 min ea). Treatment Diagnosis: increased low back pain, decreased lumbar AROM, decreased core & lumbar & bilateral LE strength, decreased functional endurance & ability to perform functional mobility tasks & ADLs, & impaired postural awareness     Post-Pain Assessment:       Pain Rating (0-10 pain scale):   0/10   Location and pain description same as pre-treatment unless indicated.    Action: [x] NA   [] Perform HEP  [] Meds as prescribed  [] Modalities as prescribed   [] Call Physician     GOALS   Patient Goal(s): Patient goals : \"decrease pain\"    Short Term Goals Completed by 2-4 weeks Goal Status   STG 1 The patient will demonstrate improved postural awareness requiring <25% verbal cueing during functional mobility tasks & exercises In progress   STG 2 The patient will self-report >10 minutes consistently standing activity tolerance without increased pain in order to demonstrate improved functional endurance. In progress   STG 3 The patient will ambulate >5 minutes consistently all surfaces independently consistently with LRD without increase in pain in order to safely ambulate in the community In progress   STG 4 The patient will navigate 12 stairs independently with reciprocal stair pattern with LRD in order to safely get up/down from basement to perform laundry Met     Long Term Goals Completed by 4-6 weeks Goal Status   LTG 1 The patient will report decreased low back pain </=3/10 consistently with movement in order to improve ability to perform functional mobility tasks In progress   LTG 2 The patient will improve pain free lumbar AROM >/= 10-15* in order to increase ability to perform functional mobility tasks efficiently. In progress   LTG 3 The patient will demonstrate competency with HEP to progress towards self management of symptoms upon D/C In progress   LTG 4 The patient will demonstrate improved B LE & core & lumbar strength >/=4/5 or Fair+ in order to perform functional mobility tasks with increased ease. In progress   LTG 5 The patient will have a decrease in Oswestry score >/=6 points in order to increase functional activity tolerance In progress       Plan:  Frequency/Duration:  Plan  Plan Frequency: 1-2xs/wk  Plan weeks: 4-6 additional weeks (up until ~8/19/22)  Current Treatment Recommendations: Strengthening, ROM, Balance training, Gait training, Stair training, Functional mobility training, Neuromuscular re-education, Manual Therapy - Soft Tissue Mobilization, Manual Therapy - Joint Manipulation, Endurance training, Safety education & training, Patient/Caregiver education & training, Therapeutic activities, Aquatics, Positioning, Modalities, Home exercise program, Pain management  Pt to continue current HEP. See objective section for any therapeutic exercise changes, additions or modifications this date.     Therapy Time:      PT Individual Minutes  Time In: 1300  Time

## 2022-07-25 ENCOUNTER — HOSPITAL ENCOUNTER (OUTPATIENT)
Dept: PHYSICAL THERAPY | Age: 67
Setting detail: THERAPIES SERIES
Discharge: HOME | End: 2022-07-25
Payer: MEDICARE

## 2022-07-25 PROCEDURE — 97110 THERAPEUTIC EXERCISES: CPT

## 2022-07-25 ASSESSMENT — PAIN DESCRIPTION - LOCATION: LOCATION: BACK

## 2022-07-25 ASSESSMENT — PAIN DESCRIPTION - ORIENTATION: ORIENTATION: LOWER

## 2022-07-25 ASSESSMENT — PAIN DESCRIPTION - DESCRIPTORS: DESCRIPTORS: DULL

## 2022-07-25 ASSESSMENT — PAIN SCALES - GENERAL: PAINLEVEL_OUTOF10: 2

## 2022-07-25 NOTE — PROGRESS NOTES
[facility]  Outpatient Physical Therapy    Treatment Note        Date: 2022  Patient: Rod Burgess  :    Confirmed: Yes  MRN: [id number]  Referring Provider: Veena Tolliver MD   Secondary Referring Provider (If applicable):     Medical Diagnosis: Spondylosis without myelopathy or radiculopathy, lumbar region [M47.816]    Treatment Diagnosis: increased low back pain, decreased lumbar AROM, decreased core & lumbar & bilateral LE strength, decreased functional endurance & ability to perform functional mobility tasks & ADLs, & impaired postural awareness    Visit Information:  Insurance: Payor: [place] / Plan: MEDICARE PART A AND B / Product Type: *No Product type* /   PT Visit Information  Onset Date:  (~years)  Total # of Visits Approved: 99  Total # of Visits to Date: 15  Plan of Care/Certification Expiration Date: 22  No Show: 0  Progress Note Due Date: 22  Canceled Appointment: 1  Progress Note Counter: - (PN due 22)    Subjective Information:  Subjective: Pt reports with 2/10 dull LBP.   HEP Compliance:  [x] Good [] Fair [] Poor [] Reports not doing due to:    Pain Screening  Patient Currently in Pain: Yes  Pain Level: 2  Pain Location: Back  Pain Orientation: Lower  Pain Descriptors: Dull    Treatment:  Exercises:  Exercises  Exercise 4: 3 way lumbar flexion stretch over PBall 10\"x10  Exercise 8: SF L4 x 6 minutes - no UE use, seat 8  Exercise 11: seated unsupported core side bends with 2# weighted ball for increased challenge, 1 set x 15 reps each side  Exercise 12: seated unsupported core rotational taps with 2# weighted ball for increased challenge, 1 set x 15 reps each side  Exercise 13: seated unsupported core diagonal chops with 2# weighted ball for increased challenge, 1 set x 15 reps each side  Exercise 14: seated unsupported core overhead with 2# weighted ball for increased challenge, 1 set x 15 reps each side          *Indicates exercise, modality, or manual techniques to be initiated when appropriate    Objective Measures:      Strength: [x] NT  [] MMT completed:     ROM: [] NT  [x] ROM measurements:      AROM Lumbar Spine   Lumbar spine general AROM: Flex 85%, Ext 25%           Assessment: Body Structures, Functions, Activity Limitations Requiring Skilled Therapeutic Intervention: Decreased functional mobility , Decreased ADL status, Decreased ROM, Decreased strength, Decreased endurance, Increased pain, Decreased posture  Assessment: Challenged pt w/ inc standing activity this date to work towards improving functional activity tolerance over current limitation of 3 min (per pt report). Step ups activity tolerated well given seated RB's provided as needed, roughly every 3-4 min during tx. LB reported at 3-4/10 post tx. Onging fatigue/weakness partially relative to recieving dialysis treatments 3 days per wk (5 hr, 15 min ea). Treatment Diagnosis: increased low back pain, decreased lumbar AROM, decreased core & lumbar & bilateral LE strength, decreased functional endurance & ability to perform functional mobility tasks & ADLs, & impaired postural awareness             Post-Pain Assessment:       Pain Rating (0-10 pain scale):  \"Just tired\" /10   Location and pain description same as pre-treatment unless indicated. Action: [] NA   [x] Perform HEP  [] Meds as prescribed  [] Modalities as prescribed   [] Call Physician     GOALS   Patient Goal(s): Patient goals : \"decrease pain\"    Short Term Goals Completed by 2-4 weeks Goal Status   STG 1 The patient will demonstrate improved postural awareness requiring <25% verbal cueing during functional mobility tasks & exercises In progress   STG 2 The patient will self-report >10 minutes consistently standing activity tolerance without increased pain in order to demonstrate improved functional endurance.  In progress   STG 3 The patient will ambulate >5 minutes consistently all surfaces independently consistently with LRD without increase in pain in order to safely ambulate in the community In progress   STG 4 The patient will navigate 12 stairs independently with reciprocal stair pattern with LRD in order to safely get up/down from basement to perform laundry Met     Long Term Goals Completed by 4-6 weeks Goal Status   LTG 1 The patient will report decreased low back pain </=3/10 consistently with movement in order to improve ability to perform functional mobility tasks In progress   LTG 2 The patient will improve pain free lumbar AROM >/= 10-15* in order to increase ability to perform functional mobility tasks efficiently. In progress   LTG 3 The patient will demonstrate competency with HEP to progress towards self management of symptoms upon D/C In progress   LTG 4 The patient will demonstrate improved B LE & core & lumbar strength >/=4/5 or Fair+ in order to perform functional mobility tasks with increased ease. In progress   LTG 5 The patient will have a decrease in Oswestry score >/=6 points in order to increase functional activity tolerance In progress          Plan:  Frequency/Duration:  Plan  Plan Frequency: 1-2xs/wk  Plan weeks: 4-6 additional weeks (up until ~8/19/22)  Current Treatment Recommendations: Strengthening, ROM, Balance training, Gait training, Stair training, Functional mobility training, Neuromuscular re-education, Manual Therapy - Soft Tissue Mobilization, Manual Therapy - Joint Manipulation, Endurance training, Safety education & training, Patient/Caregiver education & training, Therapeutic activities, Aquatics, Positioning, Modalities, Home exercise program, Pain management  Pt to continue current HEP. See objective section for any therapeutic exercise changes, additions or modifications this date.     Therapy Time:      PT Individual Minutes  Time In: 8340  Time Out: 6128  Minutes: 39  Timed Code Treatment Minutes: 39 Minutes  Procedure Minutes:0    Timed Activity Minutes Units   Ther Ex 39 3 Electronically signed by Maria E Nguyen PTA on 7/25/22 at 3:17 PM EDT

## 2022-07-29 ENCOUNTER — HOSPITAL ENCOUNTER (OUTPATIENT)
Dept: PHYSICAL THERAPY | Age: 67
Setting detail: THERAPIES SERIES
Discharge: HOME | End: 2022-07-29
Payer: MEDICARE

## 2022-07-29 PROCEDURE — 97110 THERAPEUTIC EXERCISES: CPT

## 2022-07-29 NOTE — PROGRESS NOTES
[facility]  Outpatient Physical Therapy    Treatment Note        Date: 2022  Patient: Wei Patel  :    Confirmed: Yes  MRN: [id number]  Referring Provider: Alexandra Pedersen MD   Secondary Referring Provider (If applicable):     Medical Diagnosis: Spondylosis without myelopathy or radiculopathy, lumbar region [M47.816]    Treatment Diagnosis: increased low back pain, decreased lumbar AROM, decreased core & lumbar & bilateral LE strength, decreased functional endurance & ability to perform functional mobility tasks & ADLs, & impaired postural awareness    Visit Information:  Insurance: Payor: [place] [employer] / Plan: MEDICARE PART A AND B / Product Type: *No Product type* /   PT Visit Information  Onset Date:  (~years)  Total # of Visits Approved: 99  Total # of Visits to Date: 15  Plan of Care/Certification Expiration Date: 22  No Show: 0  Progress Note Due Date: 22  Canceled Appointment: 1  Progress Note Counter:  (PN due 22)    Subjective Information:  Subjective: Pt reports walking to end of drive to get trash can ~50' and back, he had to sit down secondary to LBP. HEP Compliance:  [x] Good [] Fair [] Poor [] Reports not doing due to:    Pain Screening  Patient Currently in Pain: Denies    Treatment:  Exercises:  Exercises  Exercise 4: 3 way lumbar flexion stretch over PBall 10\"x10  Exercise 8: SF L4.5 x 6 minutes - no UE use, seat 8  Exercise 10: timed ambulation around track: 3 minutes 17 seconds  Exercise 12: standing core rotational taps with 2# weighted ball for increased challenge, 1 set x 10 reps each side  Exercise 14: standing core overhead with 2# weighted ball for increased challenge, 1 set x 10 reps       Assessment:    Body Structures, Functions, Activity Limitations Requiring Skilled Therapeutic Intervention: Decreased functional mobility , Decreased ADL status, Decreased ROM, Decreased strength, Decreased endurance, Increased pain, Decreased posture  Assessment: Pt amublated 3.17' around track this date with difficulty making it to a place to sit down secondary to LBP and SOB. Pt with increased flexed and forward posture with fatigue. Required 4 minute SRB to recover. Pt educated in energy conservation with ambulation including slower pace and pursed lipped breathing. Core exercises progressed to standing with cuing for upright posture with fading follow through. Treatment Diagnosis: increased low back pain, decreased lumbar AROM, decreased core & lumbar & bilateral LE strength, decreased functional endurance & ability to perform functional mobility tasks & ADLs, & impaired postural awareness     Post-Pain Assessment:       Pain Rating (0-10 pain scale):   3/10   Location and pain description same as pre-treatment unless indicated. Action: [x] NA   [] Perform HEP  [] Meds as prescribed  [] Modalities as prescribed   [] Call Physician     GOALS   Patient Goal(s): Patient goals : \"decrease pain\"    Short Term Goals Completed by 2-4 weeks Goal Status   STG 1 The patient will demonstrate improved postural awareness requiring <25% verbal cueing during functional mobility tasks & exercises In progress   STG 2 The patient will self-report >10 minutes consistently standing activity tolerance without increased pain in order to demonstrate improved functional endurance.  In progress   STG 3 The patient will ambulate >5 minutes consistently all surfaces independently consistently with LRD without increase in pain in order to safely ambulate in the community In progress   STG 4 The patient will navigate 12 stairs independently with reciprocal stair pattern with LRD in order to safely get up/down from basement to perform laundry Met       Long Term Goals Completed by 4-6 weeks Goal Status   LTG 1 The patient will report decreased low back pain </=3/10 consistently with movement in order to improve ability to perform functional mobility tasks In progress   LTG 2 The patient will improve pain free lumbar AROM >/= 10-15* in order to increase ability to perform functional mobility tasks efficiently. In progress   LTG 3 The patient will demonstrate competency with HEP to progress towards self management of symptoms upon D/C In progress   LTG 4 The patient will demonstrate improved B LE & core & lumbar strength >/=4/5 or Fair+ in order to perform functional mobility tasks with increased ease. In progress   LTG 5 The patient will have a decrease in Oswestry score >/=6 points in order to increase functional activity tolerance In progress            Plan:  Frequency/Duration:  Plan  Plan Frequency: 1-2xs/wk  Plan weeks: 4-6 additional weeks (up until ~8/19/22)  Current Treatment Recommendations: Strengthening, ROM, Balance training, Gait training, Stair training, Functional mobility training, Neuromuscular re-education, Manual Therapy - Soft Tissue Mobilization, Manual Therapy - Joint Manipulation, Endurance training, Safety education & training, Patient/Caregiver education & training, Therapeutic activities, Aquatics, Positioning, Modalities, Home exercise program, Pain management  Pt to continue current HEP. See objective section for any therapeutic exercise changes, additions or modifications this date.     Therapy Time:      PT Individual Minutes  Time In: 5381  Time Out: 1430  Minutes: 47  Timed Code Treatment Minutes: 37 Minutes  Procedure Minutes:CP 10 min  Timed Activity Minutes Units   Ther Ex 37 2     Electronically signed by Joby Oliver PTA on 7/29/22 at 2:22 PM EDT

## 2022-08-03 ENCOUNTER — HOSPITAL ENCOUNTER (OUTPATIENT)
Dept: PHYSICAL THERAPY | Age: 67
Setting detail: THERAPIES SERIES
Discharge: HOME | End: 2022-08-03
Payer: MEDICARE

## 2022-08-03 PROCEDURE — 97110 THERAPEUTIC EXERCISES: CPT

## 2022-08-03 ASSESSMENT — PAIN SCALES - GENERAL: PAINLEVEL_OUTOF10: 3

## 2022-08-03 ASSESSMENT — PAIN DESCRIPTION - LOCATION: LOCATION: BACK

## 2022-08-03 ASSESSMENT — PAIN DESCRIPTION - ORIENTATION: ORIENTATION: LOWER

## 2022-08-03 ASSESSMENT — PAIN DESCRIPTION - PAIN TYPE: TYPE: CHRONIC PAIN

## 2022-08-03 NOTE — PROGRESS NOTES
assessment: Redness - no adverse reaction     *Indicates exercise, modality, or manual techniques to be initiated when appropriate    Objective Measures:      Strength: [x] NT  [] MMT completed:    ROM: [x] NT  [] ROM measurements:    Assessment: Body Structures, Functions, Activity Limitations Requiring Skilled Therapeutic Intervention: Decreased functional mobility , Decreased ADL status, Decreased ROM, Decreased strength, Decreased endurance, Increased pain, Decreased posture  Assessment: Continued to finalize patient's PT POC with his second to last visit, reviewed prior exercises/stretches as well as gave patient other ways to complete hamstring & quad/hip flexor stretches at steps. Education provided on the importance of consistently performing PT HEP regularly every day as well as upon discharge Friday. Plan to discharge patient following next visit. Treatment Diagnosis: increased low back pain, decreased lumbar AROM, decreased core & lumbar & bilateral LE strength, decreased functional endurance & ability to perform functional mobility tasks & ADLs, & impaired postural awareness      Post-Pain Assessment:       Pain Rating (0-10 pain scale):   0/10   Location and pain description same as pre-treatment unless indicated. Action: [] NA   [x] Perform HEP  [] Meds as prescribed  [x] Modalities as prescribed   [] Call Physician     GOALS   Patient Goal(s): Patient goals : \"decrease pain\"    Short Term Goals Completed by 2-4 weeks Goal Status   STG 1 The patient will demonstrate improved postural awareness requiring <25% verbal cueing during functional mobility tasks & exercises In progress   STG 2 The patient will self-report >10 minutes consistently standing activity tolerance without increased pain in order to demonstrate improved functional endurance.  In progress   STG 3 The patient will ambulate >5 minutes consistently all surfaces independently consistently with LRD without increase in pain in order to safely ambulate in the community In progress   STG 4 The patient will navigate 12 stairs independently with reciprocal stair pattern with LRD in order to safely get up/down from basement to perform laundry Met     Long Term Goals Completed by 4-6 weeks Goal Status   LTG 1 The patient will report decreased low back pain </=3/10 consistently with movement in order to improve ability to perform functional mobility tasks In progress   LTG 2 The patient will improve pain free lumbar AROM >/= 10-15* in order to increase ability to perform functional mobility tasks efficiently. In progress   LTG 3 The patient will demonstrate competency with HEP to progress towards self management of symptoms upon D/C In progress   LTG 4 The patient will demonstrate improved B LE & core & lumbar strength >/=4/5 or Fair+ in order to perform functional mobility tasks with increased ease. In progress   LTG 5 The patient will have a decrease in Oswestry score >/=6 points in order to increase functional activity tolerance In progress       Plan:  Frequency/Duration:  Plan  Plan Frequency: 1-2xs/wk  Plan weeks: 4-6 additional weeks (up until ~8/19/22)  Current Treatment Recommendations: Strengthening, ROM, Balance training, Gait training, Stair training, Functional mobility training, Neuromuscular re-education, Manual Therapy - Soft Tissue Mobilization, Manual Therapy - Joint Manipulation, Endurance training, Safety education & training, Patient/Caregiver education & training, Therapeutic activities, Aquatics, Positioning, Modalities, Home exercise program, Pain management  Pt to continue current HEP. See objective section for any therapeutic exercise changes, additions or modifications this date.     Therapy Time:      PT Individual Minutes  Time In: 1246  Time Out: 1335  Minutes: 40  Timed Code Treatment Minutes: 40 Minutes  Procedure Minutes: 0 minutes  Timed Activity Minutes Units   Ther Ex 40 3   Electronically signed by Ivana Quintanilla PT

## 2022-08-05 ENCOUNTER — HOSPITAL ENCOUNTER (OUTPATIENT)
Dept: PHYSICAL THERAPY | Age: 67
Setting detail: THERAPIES SERIES
Discharge: HOME | End: 2022-08-05
Payer: MEDICARE

## 2022-08-05 PROCEDURE — 97110 THERAPEUTIC EXERCISES: CPT

## 2022-08-05 ASSESSMENT — PAIN DESCRIPTION - PAIN TYPE: TYPE: CHRONIC PAIN

## 2022-08-05 ASSESSMENT — PAIN DESCRIPTION - DESCRIPTORS: DESCRIPTORS: DULL

## 2022-08-05 ASSESSMENT — PAIN DESCRIPTION - ONSET: ONSET: ON-GOING

## 2022-08-05 ASSESSMENT — PAIN DESCRIPTION - FREQUENCY: FREQUENCY: CONTINUOUS

## 2022-08-05 ASSESSMENT — PAIN DESCRIPTION - LOCATION: LOCATION: BACK

## 2022-08-05 ASSESSMENT — PAIN DESCRIPTION - ORIENTATION: ORIENTATION: LOWER

## 2022-08-05 ASSESSMENT — PAIN SCALES - GENERAL: PAINLEVEL_OUTOF10: 2

## 2022-08-05 NOTE — PROGRESS NOTES
[address]     Ph: [phone]  Fax: [phone]      [] Certification  [] Recertification []  Plan of Care  [] Progress Note [x] Discharge      Referring Provider: Cathy Zamorano MD  [facility]  [facility], [facility] , DPT  Patient: Dru Mcdonald (77 y.o. male) : 1955 Date: 2022   Medical Diagnosis: Spondylosis without myelopathy or radiculopathy, lumbar region [M47.816]    Treatment Diagnosis: increased low back pain, decreased lumbar AROM, decreased core & lumbar & bilateral LE strength, decreased functional endurance & ability to perform functional mobility tasks & ADLs, & impaired postural awareness    Plan of Care/Certification Expiration Date: : 22   Progress Report Period from:  2022  to 2022    Visits to Date: 16 No Show: 0 Cancelled Appts: 1    OBJECTIVE:   Short Term Goals - Time Frame for Short term goals: 2-4 weeks    Goals Current/Discharge status  Status   Short term goal 1: The patient will demonstrate improved postural awareness requiring <25% verbal cueing during functional mobility tasks & exercises  Improved Met   Short term goal 2: The patient will self-report >10 minutes consistently standing activity tolerance without increased pain in order to demonstrate improved functional endurance.   ~3-4 minutes (+1-2 minutes since evaluation) Partially met   Short term goal 3: The patient will ambulate >5 minutes consistently all surfaces independently consistently with LRD without increase in pain in order to safely ambulate in the community  ~3-4 minutes (+1-2 minutes since evaluation) Partially met   Short term goal 4: The patient will navigate 12 stairs independently with reciprocal stair pattern with LRD in order to safely get up/down from basement to perform laundry  Met last PN Met     Long Term Goals - Time Frame for Long term goals : 4-6 weeks  Goals Current/ Discharge status Status   Long term goal 1: The patient will report decreased low back pain </=3/10 consistently with movement in order to improve ability to perform functional mobility tasks Pain Screening  Patient Currently in Pain: Yes  Pain Assessment: 0-10  Pain Level: 2  Pain Type: Chronic pain  Pain Location: Back  Pain Orientation: Lower  Pain Descriptors: Dull  Pain Frequency: Continuous  Pain Onset: On-going     Self-reports average pain of 3/10 Met   Long term goal 2: The patient will improve pain free lumbar AROM >/= 10-15* in order to increase ability to perform functional mobility tasks efficiently. AROM LLE (degrees)  LLE AROM : WFL (limited secondary to body habitus)   AROM RLE (degrees)  RLE AROM: WFL (limited secondary to body habitus)        Spine  Lumbar: flexion = %; extension = 50-75%; left side bend = %; right side bend = % (no pain with motions) Met   Long term goal 3: The patient will demonstrate competency with HEP to progress towards self management of symptoms upon D/C Independent/compliant Met   Long term goal 4: The patient will demonstrate improved B LE & core & lumbar strength >/=4/5 or Fair+ in order to perform functional mobility tasks with increased ease. Strength LLE  Strength LLE:  (tested in sitting)  Comment: hip flexion = 4+/5; knee extension = 5/5; knee flexion = 5/5; hip abduction = 4+/5; hip adduction = 4+/5  Strength RLE  Strength RLE:  (tested in sitting)  Comment: hip flexion = 4+/5; knee extension = 5/5; knee flexion = 5/5; hip abduction = 4+/5; hip adduction = 4+/5      Other: core = Fair+; lumbar extension =Fair + (tested in sitting)  Met   Long term goal 5:  The patient will have a decrease in Oswestry score >/=6 points in order to increase functional activity tolerance Exam: Oswestry = 16/50 (+6 point improvement since initial evaluation)  Met     Assessment: Patient is a 78year old male who presented on 6/8/22 with chronic low back pain that intermittently radiates down LLE that has been on-going for years prior who has participated in 16 outpatient PT sessions 6/8/22-8/5/22. Patient has made significant progress towards all of his goals, partially meeting if not completely meeting all goals at this time. Patient is independent/compliant with PT HEP. Patient self-reports 20% improvement since beginning outpatient PT services, however, patient believes that he has room for improvement & is also limited secondary to other medical problems. Patient is appropriate for discharge this date with recommendation for patient to continue to perform PT HEP regularly every day as well as to follow-up with MD as appropriate. PLAN:   Frequency/Duration:  Plan Comment: Discharge                     Patient Status:[] Continue/ Initiate plan of Care    [x] Discharge PT. Recommend pt continue with HEP. [] Additional visits requested, Please re-certify for additional visits:    [] Hold         Signature: Electronically signed by Huma Mathew PT on 8/5/22 at 1:26 PM EDT      If you have any questions or concerns, please don't hesitate to call. Thank you for your referral.    I have reviewed this plan of care and certify a need for medically necessary rehabilitation services.     Physician Signature:__________________________________________________________  Date:  Please sign and return

## 2022-08-05 NOTE — PROGRESS NOTES
[facility]  Outpatient Physical Therapy    Treatment Note        Date: 2022  Patient: Josee Ferris  :    Confirmed: Yes  MRN: [id number]  Referring Provider: Mulu Fortune MD  Secondary Referring Provider (If applicable):     Medical Diagnosis: Spondylosis without myelopathy or radiculopathy, lumbar region [M47.816]    Treatment Diagnosis: increased low back pain, decreased lumbar AROM, decreased core & lumbar & bilateral LE strength, decreased functional endurance & ability to perform functional mobility tasks & ADLs, & impaired postural awareness    Visit Information:  Insurance: Payor: Catrachita Roblesalexandraglenn / Plan: MEDICARE PART A AND B / Product Type: *No Product type* /   PT Visit Information  Onset Date:  (~years)  Total # of Visits Approved: 99  Total # of Visits to Date: 12  Plan of Care/Certification Expiration Date: 22  No Show: 0  Progress Note Due Date: 22  Canceled Appointment: 1  Progress Note Counter: - (PN due 22) Discharge today    Subjective Information:  Subjective: Patient reports compliance with HEP. Patient reports average of 3/10 pain in back since starting outpatient PT. Patient reports 20% improvement in walking/standing tolerance since starting outpatient PT.   HEP Compliance:  [x] Good [] Fair [] Poor [] Reports not doing due to:    Pain Screening  Patient Currently in Pain: Yes  Pain Assessment: 0-10  Pain Level: 2  Pain Type: Chronic pain  Pain Location: Back  Pain Orientation: Lower  Pain Descriptors: Dull  Pain Frequency: Continuous  Pain Onset: On-going    Treatment:  Exercises:  Exercises  Exercise 8: SF L5.0 x 6 minutes - no UE use, seat 7  Exercise 18: objective measurements  Exercise 20: HEP: continue with current (reviewed all exercises/stretches)    *Indicates exercise, modality, or manual techniques to be initiated when appropriate    Objective Measures:     Strength: [] NT  [x] MMT completed:  Strength RLE  Strength RLE:  (tested in sitting)  Comment: hip flexion = 4+/5; knee extension = 5/5; knee flexion = 5/5; hip abduction = 4+/5; hip adduction = 4+/5  Strength LLE  Strength LLE:  (tested in sitting)  Comment: hip flexion = 4+/5; knee extension = 5/5; knee flexion = 5/5; hip abduction = 4+/5; hip adduction = 4+/5        Strength Other  Other: core = Fair+; lumbar extension =Fair + (tested in sitting)    ROM: [] NT  [x] ROM measurements:     AROM LLE (degrees)  LLE AROM : WFL (limited secondary to body habitus)   AROM RLE (degrees)  RLE AROM: WFL (limited secondary to body habitus)      Spine  Lumbar: flexion = %; extension = 50-75%; left side bend = %; right side bend = % (no pain with motions)  Spine  Lumbar: flexion = %; extension = 50-75%; left side bend = %; right side bend = % (no pain with motions)     Assessment: Body Structures, Functions, Activity Limitations Requiring Skilled Therapeutic Intervention: Decreased functional mobility , Decreased ADL status, Decreased ROM, Decreased strength, Decreased endurance, Increased pain, Decreased posture  Assessment: Patient is a 78year old male who presented on 6/8/22 with chronic low back pain that intermittently radiates down LLE that has been on-going for years prior who has participated in 16 outpatient PT sessions 6/8/22-8/5/22. Patient has made significant progress towards all of his goals, partially meeting if not completely meeting all goals at this time. Patient is independent/compliant with PT HEP. Patient self-reports 20% improvement since beginning outpatient PT services. Patient is appropriate for discharge this date with recommendation for patient to continue to perform PT HEP regularly every day as well as to follow-up with MD as appropriate.   Treatment Diagnosis: increased low back pain, decreased lumbar AROM, decreased core & lumbar & bilateral LE strength, decreased functional endurance & ability to perform functional mobility tasks & ADLs, & impaired postural awareness     Post-Pain Assessment:       Pain Rating (0-10 pain scale):   \"same\"/10   Location and pain description same as pre-treatment unless indicated. Action: [] NA   [x] Perform HEP  [] Meds as prescribed  [x] Modalities as prescribed   [] Call Physician     GOALS   Patient Goal(s): Patient goals : \"decrease pain\"    Short Term Goals Completed by 2-4 weeks Goal Status   STG 1 The patient will demonstrate improved postural awareness requiring <25% verbal cueing during functional mobility tasks & exercises Met   STG 2 The patient will self-report >10 minutes consistently standing activity tolerance without increased pain in order to demonstrate improved functional endurance. Partially met   STG 3 The patient will ambulate >5 minutes consistently all surfaces independently consistently with LRD without increase in pain in order to safely ambulate in the community Partially met   STG 4 The patient will navigate 12 stairs independently with reciprocal stair pattern with LRD in order to safely get up/down from basement to perform laundry Met     Long Term Goals Completed by 4-6 weeks Goal Status   LTG 1 The patient will report decreased low back pain </=3/10 consistently with movement in order to improve ability to perform functional mobility tasks Met   LTG 2 The patient will improve pain free lumbar AROM >/= 10-15* in order to increase ability to perform functional mobility tasks efficiently. Met   LTG 3 The patient will demonstrate competency with HEP to progress towards self management of symptoms upon D/C Met   LTG 4 The patient will demonstrate improved B LE & core & lumbar strength >/=4/5 or Fair+ in order to perform functional mobility tasks with increased ease.  Met   LTG 5 The patient will have a decrease in Oswestry score >/=6 points in order to increase functional activity tolerance Met       Plan:  Frequency/Duration:  Plan  Plan Comment: Discharge  Pt to continue current

## 2022-08-17 ENCOUNTER — OFFICE VISIT (OUTPATIENT)
Dept: FAMILY MEDICINE CLINIC | Age: 67
End: 2022-08-17
Payer: MEDICARE

## 2022-08-17 VITALS
DIASTOLIC BLOOD PRESSURE: 84 MMHG | SYSTOLIC BLOOD PRESSURE: 138 MMHG | HEART RATE: 78 BPM | WEIGHT: 313 LBS | BODY MASS INDEX: 42.39 KG/M2 | HEIGHT: 72 IN

## 2022-08-17 DIAGNOSIS — R73.09 ELEVATED GLUCOSE LEVEL: Primary | ICD-10-CM

## 2022-08-17 DIAGNOSIS — G62.9 NEUROPATHY: ICD-10-CM

## 2022-08-17 DIAGNOSIS — I73.9 CLAUDICATION (HCC): ICD-10-CM

## 2022-08-17 LAB — HBA1C MFR BLD: 7 %

## 2022-08-17 PROCEDURE — 1036F TOBACCO NON-USER: CPT | Performed by: FAMILY MEDICINE

## 2022-08-17 PROCEDURE — 83036 HEMOGLOBIN GLYCOSYLATED A1C: CPT | Performed by: FAMILY MEDICINE

## 2022-08-17 PROCEDURE — G8417 CALC BMI ABV UP PARAM F/U: HCPCS | Performed by: FAMILY MEDICINE

## 2022-08-17 PROCEDURE — G8427 DOCREV CUR MEDS BY ELIG CLIN: HCPCS | Performed by: FAMILY MEDICINE

## 2022-08-17 PROCEDURE — 1123F ACP DISCUSS/DSCN MKR DOCD: CPT | Performed by: FAMILY MEDICINE

## 2022-08-17 PROCEDURE — 99214 OFFICE O/P EST MOD 30 MIN: CPT | Performed by: FAMILY MEDICINE

## 2022-08-17 PROCEDURE — 3017F COLORECTAL CA SCREEN DOC REV: CPT | Performed by: FAMILY MEDICINE

## 2022-08-17 RX ORDER — ROSUVASTATIN CALCIUM 10 MG/1
10 TABLET, COATED ORAL DAILY
Qty: 90 TABLET | Refills: 3 | Status: SHIPPED | OUTPATIENT
Start: 2022-08-17

## 2022-08-17 RX ORDER — GABAPENTIN 300 MG/1
300 CAPSULE ORAL DAILY
Qty: 90 CAPSULE | Refills: 3 | Status: SHIPPED | OUTPATIENT
Start: 2022-08-17 | End: 2022-11-15

## 2022-08-17 ASSESSMENT — ENCOUNTER SYMPTOMS
EYES NEGATIVE: 1
GASTROINTESTINAL NEGATIVE: 1
RESPIRATORY NEGATIVE: 1
SHORTNESS OF BREATH: 0
ALLERGIC/IMMUNOLOGIC NEGATIVE: 1

## 2022-08-17 NOTE — PROGRESS NOTES
Currently    Sexual activity: Yes     Social Determinants of Health     Financial Resource Strain: Low Risk     Difficulty of Paying Living Expenses: Not very hard   Food Insecurity: No Food Insecurity    Worried About Running Out of Food in the Last Year: Never true    Ran Out of Food in the Last Year: Never true     Current Outpatient Medications   Medication Sig Dispense Refill    rosuvastatin (CRESTOR) 10 MG tablet Take 1 tablet by mouth daily 90 tablet 3    gabapentin (NEURONTIN) 300 MG capsule Take 1 capsule by mouth daily for 90 days. 90 capsule 3    CPAP Machine MISC by Does not apply route Disp Cap, Hose,Face Mask and filter every three months 1 each 3    Respiratory Therapy Supplies (NEBULIZER/TUBING/MOUTHPIECE) KIT Uses nightly 1 kit 0    levETIRAcetam (KEPPRA) 250 MG tablet Take 1.5 tablets by mouth 2 times daily 270 tablet 3    Calcium Acetate, Phos Binder, 667 MG CAPS Take 2 capsules by mouth 3 times daily       celecoxib (CELEBREX) 200 MG capsule Take 1 capsule by mouth daily 90 capsule 3    B Complex-C-Folic Acid (KAYE-LEILANI) TABS TAKE 1 TABLET BY MOUTH EVERY DAY      VASCEPA 1 g CAPS capsule TK 2 CS PO BID      torsemide (DEMADEX) 100 MG tablet Take 100 mg by mouth      cinacalcet (SENSIPAR) 30 MG tablet TAKE 1 TABLET BY MOUTH ONCE A DAY AS DIRECTED WITH THE EVENING MEAL      aspirin 81 MG EC tablet Take by mouth       No current facility-administered medications for this visit.      Current Outpatient Medications on File Prior to Visit   Medication Sig Dispense Refill    CPAP Machine MISC by Does not apply route Disp Cap, Hose,Face Mask and filter every three months 1 each 3    Respiratory Therapy Supplies (NEBULIZER/TUBING/MOUTHPIECE) KIT Uses nightly 1 kit 0    levETIRAcetam (KEPPRA) 250 MG tablet Take 1.5 tablets by mouth 2 times daily 270 tablet 3    Calcium Acetate, Phos Binder, 667 MG CAPS Take 2 capsules by mouth 3 times daily       celecoxib (CELEBREX) 200 MG capsule Take 1 capsule by mouth daily 90 capsule 3    B Complex-C-Folic Acid (KAYE-LEILANI) TABS TAKE 1 TABLET BY MOUTH EVERY DAY      VASCEPA 1 g CAPS capsule TK 2 CS PO BID      torsemide (DEMADEX) 100 MG tablet Take 100 mg by mouth      cinacalcet (SENSIPAR) 30 MG tablet TAKE 1 TABLET BY MOUTH ONCE A DAY AS DIRECTED WITH THE EVENING MEAL      aspirin 81 MG EC tablet Take by mouth       No current facility-administered medications on file prior to visit. No Known Allergies  Health Maintenance   Topic Date Due    Hepatitis B vaccine (1 of 3 - Risk Recombivax 3-dose series) 08/16/1974    DTaP/Tdap/Td vaccine (1 - Tdap) Never done    Shingles vaccine (1 of 2) Never done    Lipids  02/06/2021    COVID-19 Vaccine (4 - Booster for Moderna series) 03/13/2022    Flu vaccine (1) 09/01/2022    A1C test (Diabetic or Prediabetic)  11/17/2022    Annual Wellness Visit (AWV)  12/21/2022    Depression Screen  02/14/2023    Pneumococcal 65+ years Vaccine (4 - PPSV23 or PCV20) 09/17/2023    Colorectal Cancer Screen  02/06/2025    Hepatitis C screen  Completed    Hepatitis A vaccine  Aged Out    Hib vaccine  Aged Out    Meningococcal (ACWY) vaccine  Aged Out       Review of Systems     Review of Systems   Constitutional:  Negative for activity change, appetite change, chills, fever and unexpected weight change. HENT: Negative. Eyes: Negative. Respiratory: Negative. Negative for shortness of breath. Cardiovascular: Negative. Negative for chest pain and palpitations. Gastrointestinal: Negative. Endocrine: Negative. Genitourinary: Negative. Musculoskeletal: Negative. Skin: Negative. Allergic/Immunologic: Negative. Neurological: Negative. Hematological: Negative. Psychiatric/Behavioral: Negative. Physical Exam  Vitals:    08/17/22 1051   BP: 138/84   Pulse: 78   Weight: (!) 313 lb (142 kg)   Height: 6' (1.829 m)       Physical Exam  Constitutional:       Appearance: He is well-developed.    HENT:      Right Ear: External ear normal.      Left Ear: External ear normal.   Eyes:      Conjunctiva/sclera: Conjunctivae normal.      Pupils: Pupils are equal, round, and reactive to light. Neck:      Thyroid: No thyromegaly. Cardiovascular:      Rate and Rhythm: Normal rate and regular rhythm. Heart sounds: Normal heart sounds. No murmur heard. No friction rub. No gallop. Pulmonary:      Effort: Pulmonary effort is normal. No respiratory distress. Breath sounds: Normal breath sounds. No wheezing. Abdominal:      General: Bowel sounds are normal. There is no distension. Palpations: Abdomen is soft. There is no mass. Tenderness: There is no abdominal tenderness. There is no guarding or rebound. Hernia: No hernia is present. Genitourinary:     Penis: Normal.    Musculoskeletal:         General: No tenderness. Normal range of motion. Cervical back: Normal range of motion and neck supple. Lymphadenopathy:      Cervical: No cervical adenopathy. Skin:     General: Skin is warm and dry. Neurological:      Mental Status: He is alert and oriented to person, place, and time. Cranial Nerves: No cranial nerve deficit. Coordination: Coordination normal.       Assessment   Diagnosis Orders   1. Elevated glucose level  POCT glycosylated hemoglobin (Hb A1C)      2. Neuropathy  gabapentin (NEURONTIN) 300 MG capsule      3.  Claudication [facility])  Meghana Eddy MD, Vascular Surgery, [facility]        Problem List       Neuropathy    Relevant Medications    gabapentin (NEURONTIN) 300 MG capsule       Plan  Orders Placed This Encounter   Procedures    CONCHA - Nicolle Andersen MD, Vascular Surgery, [facility]     Referral Priority:   Routine     Referral Type:   Eval and Treat     Referral Reason:   Specialty Services Required     Referred to Provider:   Nataly Diez MD     Requested Specialty:   Vascular Surgery     Number of Visits Requested:   1    POCT glycosylated hemoglobin (Hb A1C)     Orders Placed This Encounter   Medications    rosuvastatin (CRESTOR) 10 MG tablet     Sig: Take 1 tablet by mouth daily     Dispense:  90 tablet     Refill:  3    gabapentin (NEURONTIN) 300 MG capsule     Sig: Take 1 capsule by mouth daily for 90 days. Dispense:  90 capsule     Refill:  3     Requesting 1 year supply     No follow-ups on file.   Taya Michael MD

## 2022-09-06 RX ORDER — CELECOXIB 200 MG/1
200 CAPSULE ORAL DAILY
Qty: 90 CAPSULE | Refills: 0 | Status: SHIPPED | OUTPATIENT
Start: 2022-09-06 | End: 2022-09-19

## 2022-09-19 RX ORDER — CELECOXIB 200 MG/1
200 CAPSULE ORAL DAILY
Qty: 90 CAPSULE | Refills: 3 | Status: SHIPPED | OUTPATIENT
Start: 2022-09-19

## 2022-10-17 ENCOUNTER — HOSPITAL ENCOUNTER (OUTPATIENT)
Dept: NON INVASIVE DIAGNOSTICS | Age: 67
Discharge: HOME | End: 2022-10-17
Payer: MEDICARE

## 2022-10-17 PROCEDURE — C8929 TTE W OR WO FOL WCON,DOPPLER: HCPCS

## 2022-11-03 ENCOUNTER — HOSPITAL ENCOUNTER (OUTPATIENT)
Dept: CT IMAGING | Age: 67
Discharge: HOME | End: 2022-10-30
Payer: MEDICARE

## 2022-11-03 DIAGNOSIS — R60.0 LOCALIZED EDEMA: ICD-10-CM

## 2022-11-03 DIAGNOSIS — I87.1 COMPRESSION OF VEIN: ICD-10-CM

## 2022-11-03 PROCEDURE — 6360000004 HC RX CONTRAST MEDICATION: Performed by: SURGERY

## 2022-11-03 PROCEDURE — 74174 CTA ABD&PLVS W/CONTRAST: CPT

## 2022-11-03 RX ADMIN — IOPAMIDOL 125 ML: 612 INJECTION, SOLUTION INTRAVENOUS at 08:11

## 2022-11-09 DIAGNOSIS — G25.3 MYOCLONIC JERKING: ICD-10-CM

## 2022-11-11 LAB — KEPPRA: 17 UG/ML (ref 10–40)

## 2023-01-16 RX ORDER — LEVETIRACETAM 250 MG/1
375 TABLET ORAL 2 TIMES DAILY
Qty: 270 TABLET | Refills: 1 | Status: SHIPPED | OUTPATIENT
Start: 2023-01-16 | End: 2023-04-16

## 2023-02-06 ENCOUNTER — SCHEDULED TELEPHONE ENCOUNTER (OUTPATIENT)
Dept: PULMONOLOGY | Age: 68
End: 2023-02-06
Payer: MEDICARE

## 2023-02-06 DIAGNOSIS — Z99.89 OSA ON CPAP: Primary | ICD-10-CM

## 2023-02-06 DIAGNOSIS — E66.9 OBESITY, UNSPECIFIED CLASSIFICATION, UNSPECIFIED OBESITY TYPE, UNSPECIFIED WHETHER SERIOUS COMORBIDITY PRESENT: ICD-10-CM

## 2023-02-06 DIAGNOSIS — G47.33 OSA ON CPAP: Primary | ICD-10-CM

## 2023-02-06 DIAGNOSIS — G47.34 SLEEP RELATED HYPOXIA: ICD-10-CM

## 2023-02-06 DIAGNOSIS — G47.10 HYPERSOMNIA: ICD-10-CM

## 2023-02-06 PROCEDURE — 99443 PR PHYS/QHP TELEPHONE EVALUATION 21-30 MIN: CPT | Performed by: INTERNAL MEDICINE

## 2023-02-06 NOTE — PROGRESS NOTES
Tien Rodriguez is a 75 y.o. male evaluated via audio-only technology on 2/6/2023 for Follow-up and Sleep Apnea (Compliance in media)  . Assessment & Plan:   JENNIFER on CPAP  Obesity, unspecified classification, unspecified obesity type, unspecified whether serious comorbidity present  Sleep related hypoxia  Hypersomnia  -He is compliant with his CPAP. He is averaging about 8 hours. He has improvement in his hypersomnia. He denies any issues with the machine.  -We will order nocturnal oximetry on current CPAP setting of 15 cm H2O. Will assess if he continues to need oxygen at nighttime.  -Weight loss and exercise has been advised      No follow-ups on file. Subjective:   He finally received his CPAP machine. He has been using it for few weeks. He is currently on CPAP at a pressure of 15 cm H2O. His compliance is great. He is using the machine on average 8 hours every night. AHI is normal on the machine. Initially, he had a loose mask but this was changed. He is wondering if he can get off the oxygen. He believes he does not need it. He is currently on CPAP at 15 cm H2O with 2 L oxygen bleed. He denies hypersomnia. He feels more refreshed now. Prior to Admission medications    Medication Sig Start Date End Date Taking?  Authorizing Provider   levETIRAcetam (KEPPRA) 250 MG tablet Take 1.5 tablets by mouth 2 times daily 1/16/23 4/16/23 Yes Vladislav Ramon MD   celecoxib (CELEBREX) 200 MG capsule TAKE 1 CAPSULE BY MOUTH  DAILY 9/19/22  Yes Selma Haley MD   rosuvastatin (CRESTOR) 10 MG tablet Take 1 tablet by mouth daily 8/17/22  Yes Selma Haley MD   CPAP Machine MISC by Does not apply route Disp Cap, Hose,Face Mask and filter every three months 3/2/22  Yes Selma Haley MD   Respiratory Therapy Supplies (NEBULIZER/TUBING/MOUTHPIECE) KIT Uses nightly 2/14/22  Yes Selma Haley MD   Calcium Acetate, Phos Binder, 667 MG CAPS Take 2 capsules by mouth 3 times daily  8/3/21  Yes Historical Provider, MD CAN Complex-C-Folic Acid (KAYE-LEILANI) TABS TAKE 1 TABLET BY MOUTH EVERY DAY 8/13/21  Yes Historical Provider, MD   VASCEPA 1 g CAPS capsule TK 2 CS PO BID 5/13/20  Yes Historical Provider, MD   cinacalcet (SENSIPAR) 30 MG tablet TAKE 1 TABLET BY MOUTH ONCE A DAY AS DIRECTED WITH THE EVENING MEAL 1/13/20  Yes Historical Provider, MD   aspirin 81 MG EC tablet Take by mouth 4/25/07  Yes Historical Provider, MD   gabapentin (NEURONTIN) 300 MG capsule Take 1 capsule by mouth daily for 90 days. 8/17/22 11/15/22  Tosha Boyle MD   torsemide (DEMADEX) 100 MG tablet Take 100 mg by mouth    Historical Provider, MD     No Known Allergies  Past Medical History:   Diagnosis Date    Chronic kidney disease     Hemodialysis patient ([facility] [address] 05/2008    MWF    Hyperlipidemia     Hypertension     Renal failure     Sleep apnea      Past Surgical History:   Procedure Laterality Date    COLONOSCOPY      Polyps 2010, none in 2015    COLONOSCOPY N/A 2/6/2020    COLORECTAL CANCER SCREENING, HIGH RISK performed by Rosibel Golden MD at [address] [place] [address] 2008    TONSILLECTOMY AND ADENOIDECTOMY      UVULECTOMY       Social History     Tobacco Use    Smoking status: Never    Smokeless tobacco: Never   Vaping Use    Vaping Use: Never used   Substance Use Topics    Alcohol use: Not Currently    Drug use: Not Currently     Family History   Problem Relation Age of Onset    Kidney Disease Mother     Cancer Father     Heart Attack Father      Review of Systems  14 point review of systems obtained and negative except was mentioned in the HPI. No data recorded    David Edwards was evaluated through a patient-initiated, synchronous (real-time) audio only encounter. He (or guardian if applicable) is aware that it is a billable service, which includes applicable co-pays, with coverage as determined by his insurance carrier.  This visit was conducted with the patient's (and/or Amber Cat guardian's) verbal consent. He has not had a related appointment within my department in the past 7 days or scheduled within the next 24 hours. The patient was located in a state where the provider was licensed to provide care. The patient was located at: Home: [address] [address]  The provider was located at:  Facility (Appt Dept): [address] [place] [address]    Total Time: minutes: 21-30 minutes    Isaac Guerrero MD

## 2023-02-17 ENCOUNTER — OFFICE VISIT (OUTPATIENT)
Dept: FAMILY MEDICINE CLINIC | Age: 68
End: 2023-02-17

## 2023-02-17 VITALS
DIASTOLIC BLOOD PRESSURE: 60 MMHG | SYSTOLIC BLOOD PRESSURE: 126 MMHG | BODY MASS INDEX: 41.86 KG/M2 | HEIGHT: 71 IN | WEIGHT: 299 LBS | RESPIRATION RATE: 18 BRPM

## 2023-02-17 VITALS
DIASTOLIC BLOOD PRESSURE: 60 MMHG | BODY MASS INDEX: 41.86 KG/M2 | OXYGEN SATURATION: 96 % | SYSTOLIC BLOOD PRESSURE: 126 MMHG | TEMPERATURE: 97.7 F | WEIGHT: 299 LBS | HEIGHT: 71 IN | HEART RATE: 54 BPM | RESPIRATION RATE: 20 BRPM

## 2023-02-17 DIAGNOSIS — K51.418 INFLAMMATORY POLYPS OF COLON WITH OTHER COMPLICATION (HCC): ICD-10-CM

## 2023-02-17 DIAGNOSIS — E11.9 TYPE 2 DIABETES MELLITUS WITHOUT COMPLICATION, WITHOUT LONG-TERM CURRENT USE OF INSULIN (HCC): ICD-10-CM

## 2023-02-17 DIAGNOSIS — E78.5 HYPERLIPIDEMIA, UNSPECIFIED HYPERLIPIDEMIA TYPE: ICD-10-CM

## 2023-02-17 DIAGNOSIS — I10 ESSENTIAL HYPERTENSION: Primary | ICD-10-CM

## 2023-02-17 DIAGNOSIS — Z12.5 SCREENING PSA (PROSTATE SPECIFIC ANTIGEN): ICD-10-CM

## 2023-02-17 DIAGNOSIS — I20.9 ANGINA PECTORIS, UNSPECIFIED (HCC): ICD-10-CM

## 2023-02-17 DIAGNOSIS — N25.81 SECONDARY HYPERPARATHYROIDISM OF RENAL ORIGIN (HCC): ICD-10-CM

## 2023-02-17 DIAGNOSIS — I73.9 CLAUDICATION (HCC): ICD-10-CM

## 2023-02-17 DIAGNOSIS — Z99.2 DIALYSIS PATIENT (HCC): ICD-10-CM

## 2023-02-17 DIAGNOSIS — D68.9 COAGULATION DEFECT, UNSPECIFIED (HCC): ICD-10-CM

## 2023-02-17 DIAGNOSIS — Z00.00 MEDICARE ANNUAL WELLNESS VISIT, SUBSEQUENT: Primary | ICD-10-CM

## 2023-02-17 DIAGNOSIS — G40.89 OTHER SEIZURES (HCC): ICD-10-CM

## 2023-02-17 DIAGNOSIS — G63 RENAL NEUROPATHY (HCC): ICD-10-CM

## 2023-02-17 DIAGNOSIS — N28.9 RENAL NEUROPATHY (HCC): ICD-10-CM

## 2023-02-17 LAB — HBA1C MFR BLD: 6.6 %

## 2023-02-17 RX ORDER — MIDODRINE HYDROCHLORIDE 10 MG/1
1 TABLET ORAL
COMMUNITY
Start: 2022-10-27

## 2023-02-17 SDOH — ECONOMIC STABILITY: INCOME INSECURITY: HOW HARD IS IT FOR YOU TO PAY FOR THE VERY BASICS LIKE FOOD, HOUSING, MEDICAL CARE, AND HEATING?: NOT VERY HARD

## 2023-02-17 SDOH — ECONOMIC STABILITY: FOOD INSECURITY: WITHIN THE PAST 12 MONTHS, THE FOOD YOU BOUGHT JUST DIDN'T LAST AND YOU DIDN'T HAVE MONEY TO GET MORE.: NEVER TRUE

## 2023-02-17 SDOH — ECONOMIC STABILITY: HOUSING INSECURITY
IN THE LAST 12 MONTHS, WAS THERE A TIME WHEN YOU DID NOT HAVE A STEADY PLACE TO SLEEP OR SLEPT IN A SHELTER (INCLUDING NOW)?: NO

## 2023-02-17 SDOH — ECONOMIC STABILITY: FOOD INSECURITY: WITHIN THE PAST 12 MONTHS, YOU WORRIED THAT YOUR FOOD WOULD RUN OUT BEFORE YOU GOT MONEY TO BUY MORE.: NEVER TRUE

## 2023-02-17 ASSESSMENT — PATIENT HEALTH QUESTIONNAIRE - PHQ9
SUM OF ALL RESPONSES TO PHQ QUESTIONS 1-9: 0
SUM OF ALL RESPONSES TO PHQ9 QUESTIONS 1 & 2: 0
1. LITTLE INTEREST OR PLEASURE IN DOING THINGS: 0
1. LITTLE INTEREST OR PLEASURE IN DOING THINGS: 0
SUM OF ALL RESPONSES TO PHQ QUESTIONS 1-9: 0
2. FEELING DOWN, DEPRESSED OR HOPELESS: 0
2. FEELING DOWN, DEPRESSED OR HOPELESS: 0
SUM OF ALL RESPONSES TO PHQ QUESTIONS 1-9: 0
SUM OF ALL RESPONSES TO PHQ9 QUESTIONS 1 & 2: 0
SUM OF ALL RESPONSES TO PHQ QUESTIONS 1-9: 0

## 2023-02-17 ASSESSMENT — ENCOUNTER SYMPTOMS
ALLERGIC/IMMUNOLOGIC NEGATIVE: 1
RESPIRATORY NEGATIVE: 1
GASTROINTESTINAL NEGATIVE: 1
SHORTNESS OF BREATH: 0
EYES NEGATIVE: 1

## 2023-02-17 ASSESSMENT — LIFESTYLE VARIABLES
HOW OFTEN DO YOU HAVE A DRINK CONTAINING ALCOHOL: NEVER
HOW MANY STANDARD DRINKS CONTAINING ALCOHOL DO YOU HAVE ON A TYPICAL DAY: PATIENT DOES NOT DRINK

## 2023-02-17 NOTE — PROGRESS NOTES
Medicare Annual Wellness Visit    Ana Levine is here for Medicare AWV    Assessment & Plan   Medicare annual wellness visit, subsequent    Recommendations for Preventive Services Due: see orders and patient instructions/AVS.  Recommended screening schedule for the next 5-10 years is provided to the patient in written form: see Patient Instructions/AVS.     Return for Medicare Annual Wellness Visit in 1 year. Subjective       Patient's complete Health Risk Assessment and screening values have been reviewed and are found in Flowsheets. The following problems were reviewed today and where indicated follow up appointments were made and/or referrals ordered. Positive Risk Factor Screenings with Interventions:                 Weight and Activity:  Physical Activity: Insufficiently Active    Days of Exercise per Week: 2 days    Minutes of Exercise per Session: 30 min     On average, how many days per week do you engage in moderate to strenuous exercise (like a brisk walk)?: 2 days  Have you lost any weight without trying in the past 3 months?: No  Body mass index: (!) 41.7  Obesity Interventions:  See AVS for additional education material                               Objective   Vitals:    02/17/23 1059   BP: 126/60   Site: Left Upper Arm   Position: Sitting   Cuff Size: Large Adult   Resp: 18   Weight: 299 lb (135.6 kg)   Height: 5' 11\" (1.803 m)      Body mass index is 41.7 kg/m². No Known Allergies  Prior to Visit Medications    Medication Sig Taking?  Authorizing Provider   midodrine (PROAMATINE) 10 MG tablet Take 1 tablet by mouth Yes Historical Provider, MD   levETIRAcetam (KEPPRA) 250 MG tablet Take 1.5 tablets by mouth 2 times daily Yes Kade Vallejo MD   celecoxib (CELEBREX) 200 MG capsule TAKE 1 CAPSULE BY MOUTH  DAILY Yes Rubén Hurt MD   rosuvastatin (CRESTOR) 10 MG tablet Take 1 tablet by mouth daily Yes Rubén Hurt MD   gabapentin (NEURONTIN) 300 MG capsule Take 1 capsule by mouth daily for 90 days.  Yes Giuseppe Gaitan MD   CPAP Machine MISC by Does not apply route Disp Cap, Hose,Face Mask and filter every three months Yes Giuseppe Gaitan MD   Respiratory Therapy Supplies (NEBULIZER/TUBING/MOUTHPIECE) KIT Uses nightly Yes Giuseppe Gaitan MD   Calcium Acetate, Phos Binder, 667 MG CAPS Take 2 capsules by mouth 3 times daily  Yes Historical Provider, MD   B Complex-C-Folic Acid (KAYE-LEILANI) TABS TAKE 1 TABLET BY MOUTH EVERY DAY Yes Historical Provider, MD   VASCEPA 1 g CAPS capsule TK 2 CS PO BID Yes Historical Provider, MD   cinacalcet (SENSIPAR) 30 MG tablet TAKE 1 TABLET BY MOUTH ONCE A DAY AS DIRECTED WITH THE EVENING MEAL Yes Historical Provider, MD   aspirin 81 MG EC tablet Take by mouth Yes Historical Provider, MD Barrett (Including outside providers/suppliers regularly involved in providing care):   Patient Care Team:  Giuseppe Gaitan MD as PCP - General (Family Medicine)  Giuseppe Gaitan MD as PCP - Empaneled Provider  Ky Mina PA-C (Physician Assistant)  Adam Ferrer MD as Consulting Physician (Neurology)     Reviewed and updated this visit:  Tobacco  Allergies  Meds  Med Hx  Surg Hx  Soc Hx  Fam Hx             Chirag Yen, APRN - CNP

## 2023-02-17 NOTE — PATIENT INSTRUCTIONS
Advance Directives: Care Instructions  Overview  An advance directive is a legal way to state your wishes at the end of your life. It tells your family and your doctor what to do if you can't say what you want. There are two main types of advance directives. You can change them any time your wishes change. Living will. This form tells your family and your doctor your wishes about life support and other treatment. The form is also called a declaration. Medical power of . This form lets you name a person to make treatment decisions for you when you can't speak for yourself. This person is called a health care agent (health care proxy, health care surrogate). The form is also called a durable power of  for health care. If you do not have an advance directive, decisions about your medical care may be made by a family member, or by a doctor or a  who doesn't know you. It may help to think of an advance directive as a gift to the people who care for you. If you have one, they won't have to make tough decisions by themselves. For more information, including forms for your state, see the [address] website (www.caringinfo.org/planning/advance-directives/). Follow-up care is a key part of your treatment and safety. Be sure to make and go to all appointments, and call your doctor if you are having problems. It's also a good idea to know your test results and keep a list of the medicines you take. What should you include in an advance directive? Many states have a unique advance directive form. (It may ask you to address specific issues.) Or you might use a universal form that's approved by many states. If your form doesn't tell you what to address, it may be hard to know what to include in your advance directive. Use the questions below to help you get started. Who do you want to make decisions about your medical care if you are not able to?   What life-support measures do you want if you have a serious illness that gets worse over time or can't be cured? What are you most afraid of that might happen? (Maybe you're afraid of having pain, losing your independence, or being kept alive by machines.)  Where would you prefer to die? (Your home? A hospital? A nursing home?)  Do you want to donate your organs when you die? Do you want certain Anglican practices performed before you die? When should you call for help? Be sure to contact your doctor if you have any questions. Where can you learn more? Go to http://www.lemos.com/ and enter R264 to learn more about \"Advance Directives: Care Instructions. \"  Current as of: June 16, 2022               Content Version: 13.5  © 6135-9031 [employer]. Care instructions adapted under license by [facility] [address] If you have questions about a medical condition or this instruction, always ask your healthcare professional. [place] [address] any warranty or liability for your use of this information. Starting a Weight Loss Plan: Care Instructions  Overview     If you're thinking about losing weight, it can be hard to know where to start. Your doctor can help you set up a weight loss plan that best meets your needs. You may want to take a class on nutrition or exercise, or you could join a weight loss support group. If you have questions about how to make changes to your eating or exercise habits, ask your doctor about seeing a registered dietitian or an exercise specialist.  It can be a big challenge to lose weight. But you don't have to make huge changes at once. Make small changes, and stick with them. When those changes become habit, add a few more changes. If you don't think you're ready to make changes right now, try to pick a date in the future. Make an appointment to see your doctor to discuss whether the time is right for you to start a plan. Follow-up care is a key part of your treatment and safety.  Be sure to make and go to all appointments, and call your doctor if you are having problems. It's also a good idea to know your test results and keep a list of the medicines you take. How can you care for yourself at home? Set realistic goals. Many people expect to lose much more weight than is likely. A weight loss of 5% to 10% of your body weight may be enough to improve your health. Get family and friends involved to provide support. Talk to them about why you are trying to lose weight, and ask them to help. They can help by participating in exercise and having meals with you, even if they may be eating something different. Find what works best for you. If you do not have time or do not like to cook, a program that offers meal replacement bars or shakes may be better for you. Or if you like to prepare meals, finding a plan that includes daily menus and recipes may be best.  Ask your doctor about other health professionals who can help you achieve your weight loss goals. A dietitian can help you make healthy changes in your diet. An exercise specialist or  can help you develop a safe and effective exercise program.  A counselor or psychiatrist can help you cope with issues such as depression, anxiety, or family problems that can make it hard to focus on weight loss. Consider joining a support group for people who are trying to lose weight. Your doctor can suggest groups in your area. Where can you learn more? Go to http://www.woods.com/ and enter U357 to learn more about \"Starting a Weight Loss Plan: Care Instructions. \"  Current as of: August 25, 2022               Content Version: 13.5  © 2006-2022 Healthwise[employer]. Care instructions adapted under license by [facility] [employer] [facility] ([facility]).  If you have questions about a medical condition or this instruction, always ask your healthcare professional. [address] any warranty or liability for your use of this information. A Healthy Heart: Care Instructions  Your Care Instructions     Coronary artery disease, also called heart disease, occurs when a substance called plaque builds up in the vessels that supply oxygen-rich blood to your heart muscle. This can narrow the blood vessels and reduce blood flow. A heart attack happens when blood flow is completely blocked. A high-fat diet, smoking, and other factors increase the risk of heart disease. Your doctor has found that you have a chance of having heart disease. You can do lots of things to keep your heart healthy. It may not be easy, but you can change your diet, exercise more, and quit smoking. These steps really work to lower your chance of heart disease. Follow-up care is a key part of your treatment and safety. Be sure to make and go to all appointments, and call your doctor if you are having problems. It's also a good idea to know your test results and keep a list of the medicines you take. How can you care for yourself at home? Diet    Use less salt when you cook and eat. This helps lower your blood pressure. Taste food before salting. Add only a little salt when you think you need it. With time, your taste buds will adjust to less salt.     Eat fewer snack items, fast foods, canned soups, and other high-salt, high-fat, processed foods.     Read food labels and try to avoid saturated and trans fats. They increase your risk of heart disease by raising cholesterol levels.     Limit the amount of solid fat-butter, margarine, and shortening-you eat. Use olive, peanut, or canola oil when you cook. Bake, broil, and steam foods instead of frying them.     Eat a variety of fruit and vegetables every day. Dark green, deep orange, red, or yellow fruits and vegetables are especially good for you. Examples include spinach, carrots, peaches, and berries.     Foods high in fiber can reduce your cholesterol and provide important vitamins and minerals.  High-fiber foods include whole-grain cereals and breads, oatmeal, beans, brown rice, citrus fruits, and apples.     Eat lean proteins. Heart-healthy proteins include seafood, lean meats and poultry, eggs, beans, peas, nuts, seeds, and soy products.     Limit drinks and foods with added sugar. These include candy, desserts, and soda pop. Lifestyle changes    If your doctor recommends it, get more exercise. Walking is a good choice. Bit by bit, increase the amount you walk every day. Try for at least 30 minutes on most days of the week. You also may want to swim, bike, or do other activities.     Do not smoke. If you need help quitting, talk to your doctor about stop-smoking programs and medicines. These can increase your chances of quitting for good. Quitting smoking may be the most important step you can take to protect your heart. It is never too late to quit.     Limit alcohol to 2 drinks a day for men and 1 drink a day for women. Too much alcohol can cause health problems.     Manage other health problems such as diabetes, high blood pressure, and high cholesterol. If you think you may have a problem with alcohol or drug use, talk to your doctor. Medicines    Take your medicines exactly as prescribed. Call your doctor if you think you are having a problem with your medicine.     If your doctor recommends aspirin, take the amount directed each day. Make sure you take aspirin and not another kind of pain reliever, such as acetaminophen (Tylenol). When should you call for help? Call 911 if you have symptoms of a heart attack. These may include:    Chest pain or pressure, or a strange feeling in the chest.     Sweating.     Shortness of breath.     Pain, pressure, or a strange feeling in the back, neck, jaw, or upper belly or in one or both shoulders or arms.     Lightheadedness or sudden weakness.     A fast or irregular heartbeat.    After you call 911, the  may tell you to chew 1 adult-strength or 2 to 4 low-dose aspirin. Wait for an ambulance. Do not try to drive yourself.  Watch closely for changes in your health, and be sure to contact your doctor if you have any problems.  Where can you learn more?  Go to https://www.[employer].net/patientEd and enter F075 to learn more about \"A Healthy Heart: Care Instructions.\"  Current as of: September 7, 2022               Content Version: 13.5  © 3869-8094 [employer].   Care instructions adapted under license by [employer]. If you have questions about a medical condition or this instruction, always ask your healthcare professional. [employer] disclaims any warranty or liability for your use of this information.      Personalized Preventive Plan for Ashok Zheng - 2/17/2023  Medicare offers a range of preventive health benefits. Some of the tests and screenings are paid in full while other may be subject to a deductible, co-insurance, and/or copay.    Some of these benefits include a comprehensive review of your medical history including lifestyle, illnesses that may run in your family, and various assessments and screenings as appropriate.    After reviewing your medical record and screening and assessments performed today your provider may have ordered immunizations, labs, imaging, and/or referrals for you.  A list of these orders (if applicable) as well as your Preventive Care list are included within your After Visit Summary for your review.    Other Preventive Recommendations:    A preventive eye exam performed by an eye specialist is recommended every 1-2 years to screen for glaucoma; cataracts, macular degeneration, and other eye disorders.  A preventive dental visit is recommended every 6 months.  Try to get at least 150 minutes of exercise per week or 10,000 steps per day on a pedometer .  Order or download the FREE \"Exercise & Physical Activity: Your Everyday Guide\" from [employer]. Call 1-791.406.2699 or  search [employer] A[employer] online. You need 9313-8014 mg of calcium and 1771-7903 IU of vitamin D per day. It is possible to meet your calcium requirement with diet alone, but a vitamin D supplement is usually necessary to meet this goal.  When exposed to the sun, use a sunscreen that protects against both UVA and UVB radiation with an SPF of 30 or greater. Reapply every 2 to 3 hours or after sweating, drying off with a towel, or swimming. Always wear a seat belt when traveling in a car. Always wear a helmet when riding a bicycle or motorcycle.

## 2023-02-27 DIAGNOSIS — E78.5 HYPERLIPIDEMIA, UNSPECIFIED HYPERLIPIDEMIA TYPE: ICD-10-CM

## 2023-02-27 DIAGNOSIS — E11.9 TYPE 2 DIABETES MELLITUS WITHOUT COMPLICATION, WITHOUT LONG-TERM CURRENT USE OF INSULIN (HCC): ICD-10-CM

## 2023-02-27 DIAGNOSIS — I10 ESSENTIAL HYPERTENSION: ICD-10-CM

## 2023-02-27 DIAGNOSIS — Z12.5 SCREENING PSA (PROSTATE SPECIFIC ANTIGEN): ICD-10-CM

## 2023-02-27 LAB
ALBUMIN SERPL-MCNC: 4.2 G/DL (ref 3.5–4.6)
ALP BLD-CCNC: 83 U/L (ref 35–104)
ALT SERPL-CCNC: 7 U/L (ref 0–41)
ANION GAP SERPL CALCULATED.3IONS-SCNC: 20 MEQ/L (ref 9–15)
AST SERPL-CCNC: 11 U/L (ref 0–40)
BASOPHILS ABSOLUTE: 0.1 K/UL (ref 0–0.2)
BASOPHILS RELATIVE PERCENT: 0.9 %
BILIRUB SERPL-MCNC: 0.4 MG/DL (ref 0.2–0.7)
BUN BLDV-MCNC: 65 MG/DL (ref 8–23)
CALCIUM SERPL-MCNC: 9.1 MG/DL (ref 8.5–9.9)
CHLORIDE BLD-SCNC: 93 MEQ/L (ref 95–107)
CHOLESTEROL, TOTAL: 120 MG/DL (ref 0–199)
CO2: 24 MEQ/L (ref 20–31)
CREAT SERPL-MCNC: 11.36 MG/DL (ref 0.7–1.2)
EOSINOPHILS ABSOLUTE: 0.3 K/UL (ref 0–0.7)
EOSINOPHILS RELATIVE PERCENT: 3.1 %
GFR SERPL CREATININE-BSD FRML MDRD: 4.4 ML/MIN/{1.73_M2}
GLOBULIN: 3.1 G/DL (ref 2.3–3.5)
GLUCOSE BLD-MCNC: 126 MG/DL (ref 70–99)
HCT VFR BLD CALC: 43.9 % (ref 42–52)
HDLC SERPL-MCNC: 33 MG/DL (ref 40–59)
HEMOGLOBIN: 13.5 G/DL (ref 14–18)
LDL CHOLESTEROL CALCULATED: 52 MG/DL (ref 0–129)
LYMPHOCYTES ABSOLUTE: 1.5 K/UL (ref 1–4.8)
LYMPHOCYTES RELATIVE PERCENT: 14.8 %
MCH RBC QN AUTO: 24.6 PG (ref 27–31.3)
MCHC RBC AUTO-ENTMCNC: 30.7 % (ref 33–37)
MCV RBC AUTO: 80.2 FL (ref 79–92.2)
MONOCYTES ABSOLUTE: 1.1 K/UL (ref 0.2–0.8)
MONOCYTES RELATIVE PERCENT: 10.2 %
NEUTROPHILS ABSOLUTE: 7.4 K/UL (ref 1.4–6.5)
NEUTROPHILS RELATIVE PERCENT: 71 %
PDW BLD-RTO: 19.6 % (ref 11.5–14.5)
PLATELET # BLD: 273 K/UL (ref 130–400)
POTASSIUM SERPL-SCNC: 5.4 MEQ/L (ref 3.4–4.9)
PROSTATE SPECIFIC ANTIGEN: 2.51 NG/ML (ref 0–4)
RBC # BLD: 5.47 M/UL (ref 4.7–6.1)
SODIUM BLD-SCNC: 137 MEQ/L (ref 135–144)
TOTAL PROTEIN: 7.3 G/DL (ref 6.3–8)
TRIGL SERPL-MCNC: 176 MG/DL (ref 0–150)
WBC # BLD: 10.4 K/UL (ref 4.8–10.8)

## 2023-03-31 ENCOUNTER — TELEPHONE (OUTPATIENT)
Dept: PULMONOLOGY | Age: 68
End: 2023-03-31

## 2023-03-31 NOTE — TELEPHONE ENCOUNTER
WE RECENTLY SENT ORDER FOR CPAP FOR PATIENT TO [address] [place] [address]. HE STATES THAT HE NEEDS ANOTHER ORDER FOR THE [facility] AND SUPPLIES SENT TO THEM.  PLEASE ADVISE

## 2023-05-12 PROBLEM — G25.3 MYOCLONIC JERKING: Status: ACTIVE | Noted: 2023-05-12

## 2023-05-12 PROBLEM — E88.09 OTHER DISORDERS OF PLASMA-PROTEIN METABOLISM, NOT ELSEWHERE CLASSIFIED: Status: ACTIVE | Noted: 2022-09-27

## 2023-05-12 PROBLEM — I13.11 HYPERTENSIVE HEART AND CHRONIC KIDNEY DISEASE STAGE 5 (HCC): Status: ACTIVE | Noted: 2023-05-12

## 2023-05-12 PROBLEM — E78.5 HYPERLIPIDEMIA, UNSPECIFIED: Status: ACTIVE | Noted: 2021-10-19

## 2023-05-12 PROBLEM — E78.2 MIXED HYPERLIPIDEMIA: Status: ACTIVE | Noted: 2022-03-21

## 2023-05-12 PROBLEM — E87.5 HYPERKALEMIA: Status: ACTIVE | Noted: 2022-06-16

## 2023-05-12 PROBLEM — M48.062 PSEUDOCLAUDICATION SYNDROME: Status: ACTIVE | Noted: 2023-05-12

## 2023-05-12 PROBLEM — N18.5 HYPERTENSIVE HEART AND CHRONIC KIDNEY DISEASE STAGE 5 (HCC): Status: ACTIVE | Noted: 2023-05-12

## 2023-05-12 PROBLEM — I87.1 COMPRESSION OF VEIN: Status: ACTIVE | Noted: 2022-12-08

## 2023-05-15 ENCOUNTER — OFFICE VISIT (OUTPATIENT)
Dept: NEUROLOGY | Age: 68
End: 2023-05-15
Payer: MEDICARE

## 2023-05-15 VITALS
WEIGHT: 296.7 LBS | HEART RATE: 69 BPM | BODY MASS INDEX: 41.38 KG/M2 | DIASTOLIC BLOOD PRESSURE: 70 MMHG | SYSTOLIC BLOOD PRESSURE: 122 MMHG

## 2023-05-15 DIAGNOSIS — E66.01 OBESITY, CLASS III, BMI 40-49.9 (MORBID OBESITY) (HCC): ICD-10-CM

## 2023-05-15 DIAGNOSIS — Q61.3 POLYCYSTIC KIDNEY DISEASE: ICD-10-CM

## 2023-05-15 DIAGNOSIS — R27.8 ASTERIXIS: Primary | ICD-10-CM

## 2023-05-15 DIAGNOSIS — R27.8 ASTERIXIS: ICD-10-CM

## 2023-05-15 DIAGNOSIS — G47.33 OSA (OBSTRUCTIVE SLEEP APNEA): ICD-10-CM

## 2023-05-15 PROCEDURE — 99215 OFFICE O/P EST HI 40 MIN: CPT | Performed by: PSYCHIATRY & NEUROLOGY

## 2023-05-15 PROCEDURE — 1036F TOBACCO NON-USER: CPT | Performed by: PSYCHIATRY & NEUROLOGY

## 2023-05-15 PROCEDURE — 3074F SYST BP LT 130 MM HG: CPT | Performed by: PSYCHIATRY & NEUROLOGY

## 2023-05-15 PROCEDURE — G8427 DOCREV CUR MEDS BY ELIG CLIN: HCPCS | Performed by: PSYCHIATRY & NEUROLOGY

## 2023-05-15 PROCEDURE — 1123F ACP DISCUSS/DSCN MKR DOCD: CPT | Performed by: PSYCHIATRY & NEUROLOGY

## 2023-05-15 PROCEDURE — G8417 CALC BMI ABV UP PARAM F/U: HCPCS | Performed by: PSYCHIATRY & NEUROLOGY

## 2023-05-15 PROCEDURE — 3017F COLORECTAL CA SCREEN DOC REV: CPT | Performed by: PSYCHIATRY & NEUROLOGY

## 2023-05-15 PROCEDURE — 3078F DIAST BP <80 MM HG: CPT | Performed by: PSYCHIATRY & NEUROLOGY

## 2023-05-15 RX ORDER — DIFELIKEFALIN 50 UG/ML
1.3 INJECTION, SOLUTION INTRAVENOUS
COMMUNITY
Start: 2023-04-08 | End: 2023-06-29

## 2023-05-15 RX ORDER — LEVETIRACETAM 250 MG/1
375 TABLET ORAL 2 TIMES DAILY
Qty: 270 TABLET | Refills: 1 | Status: SHIPPED | OUTPATIENT
Start: 2023-05-15 | End: 2023-08-13

## 2023-05-15 ASSESSMENT — ENCOUNTER SYMPTOMS
BACK PAIN: 0
COLOR CHANGE: 0
TROUBLE SWALLOWING: 0
VOMITING: 0
NAUSEA: 0
CHOKING: 0
SHORTNESS OF BREATH: 0
PHOTOPHOBIA: 0

## 2023-05-16 LAB — LEVETIRACETAM SERPL-MCNC: 21 UG/ML (ref 10–40)

## 2023-06-16 DIAGNOSIS — G62.9 NEUROPATHY: ICD-10-CM

## 2023-06-19 RX ORDER — GABAPENTIN 300 MG/1
CAPSULE ORAL
Qty: 90 CAPSULE | Refills: 3 | OUTPATIENT
Start: 2023-06-19 | End: 2023-09-17

## 2023-06-19 RX ORDER — ROSUVASTATIN CALCIUM 10 MG/1
10 TABLET, COATED ORAL DAILY
Qty: 90 TABLET | Refills: 3 | OUTPATIENT
Start: 2023-06-19

## 2023-06-22 ENCOUNTER — TELEPHONE (OUTPATIENT)
Dept: INTERVENTIONAL RADIOLOGY/VASCULAR | Age: 68
End: 2023-06-22

## 2023-06-22 NOTE — TELEPHONE ENCOUNTER
DEONDRE FROM [facility] CALLED REGARDING CHELSEA'S PROCEDURE FOR 6/23/23 MENTIONING PATIENT WOULD LIKE TO WAIT FOR PROCEDURE.

## 2023-08-11 ENCOUNTER — TELEPHONE (OUTPATIENT)
Dept: ADMINISTRATIVE | Age: 68
End: 2023-08-11

## 2023-08-11 NOTE — TELEPHONE ENCOUNTER
[email or web address] calling to follow up on the fax that was sent 8/10/2023 for authorization for DM Equipment. Please return the fax to: [phone]. Please contact the office with any questions  Thank you  And if you have not received the fax please reach out.

## 2023-08-17 ENCOUNTER — TELEPHONE (OUTPATIENT)
Dept: FAMILY MEDICINE CLINIC | Age: 68
End: 2023-08-17

## 2023-08-17 NOTE — TELEPHONE ENCOUNTER
----- Message from Jeovany Power sent at 8/17/2023  1:47 PM EDT -----  Subject: Message to Provider    QUESTIONS  Information for Provider? Candelaria Harley from [employer] calling to   f/u on a fax sent on 8/15. Candelaria Harley needs confirmation that this faxed   prior authorization for DME equipment was successfully received and what   the turn around time is for them receiving it back. Please call Candelaria Harley at   #[phone] to confirm. Please fax request back to #[phone]  ---------------------------------------------------------------------------  --------------  Bob [place] Cheli  [phone]; OK to leave message on voicemail  ---------------------------------------------------------------------------  --------------  SCRIPT ANSWERS  Relationship to Patient? Covered Entity  Covered Entity Type? Durable Medical Equipment? Representative Name?  Raymon

## 2023-09-11 RX ORDER — CELECOXIB 200 MG/1
200 CAPSULE ORAL DAILY
Qty: 90 CAPSULE | Refills: 1 | Status: SHIPPED | OUTPATIENT
Start: 2023-09-11

## 2023-09-15 ENCOUNTER — OFFICE VISIT (OUTPATIENT)
Dept: FAMILY MEDICINE CLINIC | Age: 68
End: 2023-09-15

## 2023-09-15 VITALS
BODY MASS INDEX: 40.32 KG/M2 | DIASTOLIC BLOOD PRESSURE: 60 MMHG | OXYGEN SATURATION: 95 % | HEART RATE: 72 BPM | TEMPERATURE: 97.2 F | HEIGHT: 71 IN | WEIGHT: 288 LBS | SYSTOLIC BLOOD PRESSURE: 126 MMHG

## 2023-09-15 DIAGNOSIS — I10 ESSENTIAL HYPERTENSION: Primary | ICD-10-CM

## 2023-09-15 DIAGNOSIS — E78.5 HYPERLIPIDEMIA, UNSPECIFIED HYPERLIPIDEMIA TYPE: ICD-10-CM

## 2023-09-15 DIAGNOSIS — Z99.2 DIALYSIS PATIENT (HCC): ICD-10-CM

## 2023-09-15 PROBLEM — E11.22 TYPE 2 DIABETES MELLITUS WITH CHRONIC KIDNEY DISEASE (HCC): Status: ACTIVE | Noted: 2023-09-15

## 2023-09-15 PROBLEM — E11.40 TYPE 2 DIABETES MELLITUS WITH DIABETIC NEUROPATHY (HCC): Status: ACTIVE | Noted: 2023-09-15

## 2023-09-15 RX ORDER — DIFELIKEFALIN 50 UG/ML
1.3 INJECTION, SOLUTION INTRAVENOUS
COMMUNITY
Start: 2023-07-01 | End: 2024-06-29

## 2023-09-15 ASSESSMENT — ENCOUNTER SYMPTOMS
EYES NEGATIVE: 1
ALLERGIC/IMMUNOLOGIC NEGATIVE: 1
RESPIRATORY NEGATIVE: 1
SHORTNESS OF BREATH: 0
GASTROINTESTINAL NEGATIVE: 1

## 2023-09-15 NOTE — PROGRESS NOTES
abdominal tenderness. There is no guarding or rebound. Hernia: No hernia is present. Genitourinary:     Penis: Normal.    Musculoskeletal:         General: No tenderness. Normal range of motion. Cervical back: Normal range of motion and neck supple. Lymphadenopathy:      Cervical: No cervical adenopathy. Skin:     General: Skin is warm and dry. Neurological:      Mental Status: He is alert and oriented to person, place, and time. Cranial Nerves: No cranial nerve deficit. Coordination: Coordination normal.         Assessment   Diagnosis Orders   1. Essential hypertension        2. Dialysis patient ([address] [facility])        3. Hyperlipidemia, unspecified hyperlipidemia type          Problem List       Essential hypertension - Primary    Dialysis patient ([facility])    Hyperlipidemia, unspecified    Relevant Medications    aspirin 81 MG EC tablet    VASCEPA 1 g CAPS capsule    midodrine (PROAMATINE) 10 MG tablet    rosuvastatin (CRESTOR) 10 MG tablet       Plan  Follow up in 6 months. No orders of the defined types were placed in this encounter. No follow-ups on file.   Dhaval Major MD

## 2023-09-21 PROBLEM — L29.9 PRURITUS, UNSPECIFIED: Status: ACTIVE | Noted: 2023-04-07

## 2023-09-21 PROBLEM — R27.8 ASTERIXIS: Status: ACTIVE | Noted: 2023-09-21

## 2023-09-21 PROBLEM — I77.0 ANEURYSM OF ARTERIOVENOUS FISTULA (HCC): Status: RESOLVED | Noted: 2021-08-24 | Resolved: 2023-09-21

## 2023-09-21 PROBLEM — R19.7 DIARRHEA, UNSPECIFIED: Status: RESOLVED | Noted: 2021-06-03 | Resolved: 2023-09-21

## 2023-09-21 PROBLEM — T78.40XA ALLERGY, UNSPECIFIED, INITIAL ENCOUNTER: Status: RESOLVED | Noted: 2021-06-03 | Resolved: 2023-09-21

## 2023-09-25 ENCOUNTER — OFFICE VISIT (OUTPATIENT)
Dept: NEUROLOGY | Age: 68
End: 2023-09-25
Payer: MEDICARE

## 2023-09-25 VITALS
BODY MASS INDEX: 41.03 KG/M2 | SYSTOLIC BLOOD PRESSURE: 120 MMHG | DIASTOLIC BLOOD PRESSURE: 68 MMHG | WEIGHT: 294.2 LBS | HEART RATE: 81 BPM

## 2023-09-25 DIAGNOSIS — G90.1 DYSAUTONOMIA (HCC): ICD-10-CM

## 2023-09-25 DIAGNOSIS — G62.9 NEUROPATHY: ICD-10-CM

## 2023-09-25 DIAGNOSIS — G25.3 MYOCLONUS: Primary | ICD-10-CM

## 2023-09-25 DIAGNOSIS — G47.33 OSA (OBSTRUCTIVE SLEEP APNEA): ICD-10-CM

## 2023-09-25 PROCEDURE — 3074F SYST BP LT 130 MM HG: CPT | Performed by: PSYCHIATRY & NEUROLOGY

## 2023-09-25 PROCEDURE — G8417 CALC BMI ABV UP PARAM F/U: HCPCS | Performed by: PSYCHIATRY & NEUROLOGY

## 2023-09-25 PROCEDURE — 1123F ACP DISCUSS/DSCN MKR DOCD: CPT | Performed by: PSYCHIATRY & NEUROLOGY

## 2023-09-25 PROCEDURE — 3078F DIAST BP <80 MM HG: CPT | Performed by: PSYCHIATRY & NEUROLOGY

## 2023-09-25 PROCEDURE — 3017F COLORECTAL CA SCREEN DOC REV: CPT | Performed by: PSYCHIATRY & NEUROLOGY

## 2023-09-25 PROCEDURE — G8427 DOCREV CUR MEDS BY ELIG CLIN: HCPCS | Performed by: PSYCHIATRY & NEUROLOGY

## 2023-09-25 PROCEDURE — 99214 OFFICE O/P EST MOD 30 MIN: CPT | Performed by: PSYCHIATRY & NEUROLOGY

## 2023-09-25 PROCEDURE — 1036F TOBACCO NON-USER: CPT | Performed by: PSYCHIATRY & NEUROLOGY

## 2023-09-25 RX ORDER — LEVETIRACETAM 250 MG/1
375 TABLET ORAL 2 TIMES DAILY
Qty: 270 TABLET | Refills: 1 | Status: SHIPPED | OUTPATIENT
Start: 2023-09-25 | End: 2024-03-23

## 2023-09-25 ASSESSMENT — ENCOUNTER SYMPTOMS
COLOR CHANGE: 0
NAUSEA: 0
PHOTOPHOBIA: 0
TROUBLE SWALLOWING: 0
CHOKING: 0
BACK PAIN: 0
SHORTNESS OF BREATH: 0
VOMITING: 0

## 2023-09-25 NOTE — PROGRESS NOTES
Subjective:      Patient ID: Stacey Sheridan is a 70 y.o. male who presents today for:  Chief Complaint   Patient presents with    Follow-up     Pt states that he is doing pretty good. Pt states that the tremor has become less and less. He states that he has noticed a tremor some after his dialysis. HPI 50-year-old right-handed gentleman with a history of asterixis obstructive sleep apnea and polycystic disease. History of myoclonus and he had myoclonic jerking responsive to Keppra. He was on a very low-dose of Keppra and all his investigations were normal.  It is likely that myoclonic jerking was related to azotemia and he has not any side effects. His creatinine when last seen was still quite high. The problem with Keppra is that he is dialyzed during dialysis  And actually is aware that this wears off.   He has not developed any side effects he is now on 1-1/2 tablets twice a day    Past Medical History:   Diagnosis Date    Chronic kidney disease     Hemodialysis patient ([address]) 05/2008    MWF    Hyperlipidemia     Hypertension     Renal failure     Sleep apnea      Past Surgical History:   Procedure Laterality Date    COLONOSCOPY      Polyps 2010, none in 2015    COLONOSCOPY N/A 2/6/2020    COLORECTAL CANCER SCREENING, HIGH RISK performed by Margoth Gaspar MD at [facility] Left 2008    TONSILLECTOMY AND ADENOIDECTOMY      UVULECTOMY       Social History     Socioeconomic History    Marital status:      Spouse name: Not on file    Number of children: Not on file    Years of education: Not on file    Highest education level: Not on file   Occupational History    Not on file   Tobacco Use    Smoking status: Never    Smokeless tobacco: Never   Vaping Use    Vaping Use: Never used   Substance and Sexual Activity    Alcohol use: Not Currently    Drug use: Not Currently    Sexual activity: Yes   Other Topics Concern    Not on file   Social History Narrative

## 2023-10-23 PROBLEM — E86.1 HYPOTENSION DUE TO HYPOVOLEMIA: Status: ACTIVE | Noted: 2023-10-23

## 2023-10-23 PROBLEM — I49.3 PREMATURE VENTRICULAR CONTRACTIONS: Status: ACTIVE | Noted: 2023-10-23

## 2023-10-23 PROBLEM — E78.5 HYPERLIPIDEMIA WITH TARGET LDL LESS THAN 100: Status: ACTIVE | Noted: 2023-10-23

## 2023-10-23 PROBLEM — Z99.2 ESRD (END STAGE RENAL DISEASE) ON DIALYSIS (MULTI): Status: ACTIVE | Noted: 2023-10-23

## 2023-10-23 PROBLEM — R00.1 SINUS BRADYCARDIA: Status: ACTIVE | Noted: 2023-10-23

## 2023-10-23 PROBLEM — I44.0 FIRST-DEGREE ATRIOVENTRICULAR BLOCK: Status: ACTIVE | Noted: 2023-10-23

## 2023-10-23 PROBLEM — I45.10 RIGHT BUNDLE BRANCH BLOCK (RBBB): Status: ACTIVE | Noted: 2023-10-23

## 2023-10-23 PROBLEM — I10 ESSENTIAL HYPERTENSION: Status: ACTIVE | Noted: 2023-10-23

## 2023-10-23 PROBLEM — T82.898A ANEURYSM OF ARTERIOVENOUS DIALYSIS FISTULA (CMS-HCC): Status: ACTIVE | Noted: 2023-10-23

## 2023-10-23 PROBLEM — E78.2 MIXED HYPERLIPIDEMIA: Status: ACTIVE | Noted: 2023-10-23

## 2023-10-23 PROBLEM — I95.89 HYPOTENSION DUE TO HYPOVOLEMIA: Status: ACTIVE | Noted: 2023-10-23

## 2023-10-23 PROBLEM — I45.2 BIFASCICULAR BLOCK: Status: ACTIVE | Noted: 2023-10-23

## 2023-10-23 PROBLEM — G47.33 SLEEP APNEA, OBSTRUCTIVE: Status: ACTIVE | Noted: 2023-10-23

## 2023-10-23 PROBLEM — M48.062 NEUROGENIC CLAUDICATION DUE TO LUMBAR SPINAL STENOSIS: Status: ACTIVE | Noted: 2023-10-23

## 2023-10-23 PROBLEM — N18.6 ESRD (END STAGE RENAL DISEASE) ON DIALYSIS (MULTI): Status: ACTIVE | Noted: 2023-10-23

## 2023-10-23 RX ORDER — LEVETIRACETAM 250 MG/1
TABLET ORAL
COMMUNITY

## 2023-10-23 RX ORDER — CINACALCET 30 MG/1
TABLET, FILM COATED ORAL
COMMUNITY

## 2023-10-23 RX ORDER — ASPIRIN 81 MG/1
1 TABLET ORAL DAILY
COMMUNITY

## 2023-10-23 RX ORDER — FOLIC ACID/VIT B COMPLEX AND C 0.8 MG
TABLET ORAL
COMMUNITY

## 2023-10-23 RX ORDER — GABAPENTIN 300 MG/1
1 CAPSULE ORAL NIGHTLY
COMMUNITY

## 2023-10-23 RX ORDER — CELECOXIB 200 MG/1
200 CAPSULE ORAL
COMMUNITY

## 2023-10-23 RX ORDER — MIDODRINE HYDROCHLORIDE 10 MG/1
10 TABLET ORAL
COMMUNITY

## 2023-10-23 RX ORDER — ROSUVASTATIN CALCIUM 10 MG/1
1 TABLET, COATED ORAL NIGHTLY
COMMUNITY
End: 2023-11-01 | Stop reason: SDUPTHER

## 2023-10-23 RX ORDER — ICOSAPENT ETHYL 1 G/1
2 CAPSULE ORAL 2 TIMES DAILY
COMMUNITY
Start: 2022-03-11 | End: 2023-11-01 | Stop reason: SDUPTHER

## 2023-10-23 RX ORDER — CALCIUM ACETATE 667 MG/1
TABLET ORAL
COMMUNITY

## 2023-10-27 LAB
CHOLEST SERPL-MCNC: 123 MG/DL (ref 0–199)
HDLC SERPL-MCNC: 37 MG/DL (ref 40–59)
LDLC SERPL CALC-MCNC: 60 MG/DL (ref 0–129)
TRIGL SERPL-MCNC: 128 MG/DL (ref 0–150)

## 2023-10-31 PROBLEM — E66.01 MORBID OBESITY WITH BMI OF 40.0-44.9, ADULT (MULTI): Status: ACTIVE | Noted: 2023-10-31

## 2023-10-31 PROBLEM — I10 HYPERTENSION: Status: ACTIVE | Noted: 2023-10-31

## 2023-11-01 ENCOUNTER — OFFICE VISIT (OUTPATIENT)
Dept: CARDIOLOGY | Facility: CLINIC | Age: 68
End: 2023-11-01
Payer: MEDICARE

## 2023-11-01 VITALS
WEIGHT: 289 LBS | SYSTOLIC BLOOD PRESSURE: 78 MMHG | HEART RATE: 75 BPM | BODY MASS INDEX: 40.31 KG/M2 | DIASTOLIC BLOOD PRESSURE: 58 MMHG

## 2023-11-01 DIAGNOSIS — I45.2 BIFASCICULAR BLOCK: ICD-10-CM

## 2023-11-01 DIAGNOSIS — E78.5 HYPERLIPIDEMIA WITH TARGET LDL LESS THAN 100: ICD-10-CM

## 2023-11-01 DIAGNOSIS — R94.31 ABNORMAL EKG: ICD-10-CM

## 2023-11-01 DIAGNOSIS — I95.1 ORTHOSTATIC HYPOTENSION: ICD-10-CM

## 2023-11-01 DIAGNOSIS — I95.3 HEMODIALYSIS-ASSOCIATED HYPOTENSION: ICD-10-CM

## 2023-11-01 PROBLEM — G25.3 MYOCLONUS: Status: ACTIVE | Noted: 2023-05-12

## 2023-11-01 PROBLEM — I95.9 HYPOTENSION: Status: ACTIVE | Noted: 2022-03-21

## 2023-11-01 PROBLEM — M48.062 PSEUDOCLAUDICATION SYNDROME: Status: ACTIVE | Noted: 2023-05-12

## 2023-11-01 PROBLEM — T78.2XXA ANAPHYLACTIC SHOCK, UNSPECIFIED, INITIAL ENCOUNTER: Status: ACTIVE | Noted: 2021-06-03

## 2023-11-01 PROBLEM — G89.29 CHRONIC MIDLINE LOW BACK PAIN WITHOUT SCIATICA: Status: ACTIVE | Noted: 2019-05-09

## 2023-11-01 PROBLEM — R27.8 ASTERIXIS: Status: ACTIVE | Noted: 2023-09-21

## 2023-11-01 PROBLEM — E83.39 OTHER DISORDERS OF PHOSPHORUS METABOLISM: Status: ACTIVE | Noted: 2021-08-13

## 2023-11-01 PROBLEM — I77.0 ANEURYSM OF ARTERIOVENOUS FISTULA (CMS-HCC): Status: ACTIVE | Noted: 2021-08-24

## 2023-11-01 PROBLEM — E87.5 HYPERKALEMIA: Status: ACTIVE | Noted: 2022-06-16

## 2023-11-01 PROBLEM — R25.1 TREMOR: Status: ACTIVE | Noted: 2021-10-23

## 2023-11-01 PROBLEM — Q61.02 MULTIPLE RENAL CYSTS: Status: ACTIVE | Noted: 2020-01-20

## 2023-11-01 PROBLEM — I87.1 COMPRESSION OF VEIN: Status: ACTIVE | Noted: 2022-12-08

## 2023-11-01 PROBLEM — Z99.2 DIALYSIS PATIENT (CMS-HCC): Status: ACTIVE | Noted: 2017-03-07

## 2023-11-01 PROBLEM — R77.0 ABNORMALITY OF ALBUMIN: Status: ACTIVE | Noted: 2022-04-27

## 2023-11-01 PROBLEM — R07.9 CHEST PAIN, UNSPECIFIED: Status: ACTIVE | Noted: 2021-06-03

## 2023-11-01 PROBLEM — D12.4 ADENOMATOUS POLYP OF DESCENDING COLON: Status: ACTIVE | Noted: 2020-02-06

## 2023-11-01 PROBLEM — N28.9: Status: ACTIVE | Noted: 2022-02-14

## 2023-11-01 PROBLEM — G90.1 DYSAUTONOMIA (MULTI): Status: ACTIVE | Noted: 2023-09-25

## 2023-11-01 PROBLEM — G47.33 OBSTRUCTIVE SLEEP APNEA SYNDROME: Status: ACTIVE | Noted: 2020-01-20

## 2023-11-01 PROBLEM — N40.0 BPH WITHOUT OBSTRUCTION/LOWER URINARY TRACT SYMPTOMS: Status: ACTIVE | Noted: 2019-05-09

## 2023-11-01 PROBLEM — N18.5 HYPERTENSIVE HEART AND CHRONIC KIDNEY DISEASE STAGE 5 (MULTI): Status: ACTIVE | Noted: 2023-05-12

## 2023-11-01 PROBLEM — I13.11 HYPERTENSIVE HEART AND CHRONIC KIDNEY DISEASE STAGE 5 (MULTI): Status: ACTIVE | Noted: 2023-05-12

## 2023-11-01 PROBLEM — G40.89 OTHER SEIZURES (MULTI): Status: ACTIVE | Noted: 2021-10-15

## 2023-11-01 PROBLEM — M54.50 CHRONIC MIDLINE LOW BACK PAIN WITHOUT SCIATICA: Status: ACTIVE | Noted: 2019-05-09

## 2023-11-01 PROBLEM — R11.0 NAUSEA: Status: ACTIVE | Noted: 2021-06-03

## 2023-11-01 PROBLEM — R60.0 EDEMA OF LOWER EXTREMITY: Status: ACTIVE | Noted: 2020-01-03

## 2023-11-01 PROBLEM — R06.09 OTHER FORMS OF DYSPNEA: Status: ACTIVE | Noted: 2019-10-26

## 2023-11-01 PROBLEM — M47.816 LUMBAR SPONDYLOSIS: Status: ACTIVE | Noted: 2022-05-17

## 2023-11-01 PROBLEM — N18.5: Status: ACTIVE | Noted: 2020-01-20

## 2023-11-01 PROBLEM — G63: Status: ACTIVE | Noted: 2022-02-14

## 2023-11-01 PROBLEM — L29.9 PRURITUS, UNSPECIFIED: Status: ACTIVE | Noted: 2023-04-07

## 2023-11-01 PROBLEM — R53.83 OTHER FATIGUE: Status: ACTIVE | Noted: 2019-08-21

## 2023-11-01 PROBLEM — R19.7 DIARRHEA, UNSPECIFIED: Status: ACTIVE | Noted: 2021-06-03

## 2023-11-01 PROBLEM — I95.9 ARTERIAL HYPOTENSION: Status: RESOLVED | Noted: 2023-11-01 | Resolved: 2023-11-01

## 2023-11-01 PROBLEM — I44.0 FIRST DEGREE ATRIOVENTRICULAR BLOCK: Status: ACTIVE | Noted: 2021-08-24

## 2023-11-01 PROBLEM — I95.9 ARTERIAL HYPOTENSION: Status: ACTIVE | Noted: 2023-11-01

## 2023-11-01 PROBLEM — R39.2: Status: ACTIVE | Noted: 2018-06-15

## 2023-11-01 PROBLEM — E88.09 OTHER DISORDERS OF PLASMA-PROTEIN METABOLISM, NOT ELSEWHERE CLASSIFIED: Status: ACTIVE | Noted: 2022-09-27

## 2023-11-01 PROBLEM — E11.22 TYPE 2 DIABETES MELLITUS WITH CHRONIC KIDNEY DISEASE (MULTI): Status: ACTIVE | Noted: 2023-09-15

## 2023-11-01 PROBLEM — I49.3 VENTRICULAR PREMATURE BEATS: Status: ACTIVE | Noted: 2021-08-24

## 2023-11-01 PROBLEM — E83.52 HYPERCALCEMIA: Status: ACTIVE | Noted: 2021-07-19

## 2023-11-01 PROBLEM — T78.40XA ALLERGY, UNSPECIFIED, INITIAL ENCOUNTER: Status: ACTIVE | Noted: 2021-06-03

## 2023-11-01 PROCEDURE — 1036F TOBACCO NON-USER: CPT | Performed by: INTERNAL MEDICINE

## 2023-11-01 PROCEDURE — 93000 ELECTROCARDIOGRAM COMPLETE: CPT | Performed by: INTERNAL MEDICINE

## 2023-11-01 PROCEDURE — 3078F DIAST BP <80 MM HG: CPT | Performed by: INTERNAL MEDICINE

## 2023-11-01 PROCEDURE — 1159F MED LIST DOCD IN RCRD: CPT | Performed by: INTERNAL MEDICINE

## 2023-11-01 PROCEDURE — 99214 OFFICE O/P EST MOD 30 MIN: CPT | Performed by: INTERNAL MEDICINE

## 2023-11-01 PROCEDURE — 1160F RVW MEDS BY RX/DR IN RCRD: CPT | Performed by: INTERNAL MEDICINE

## 2023-11-01 PROCEDURE — 3074F SYST BP LT 130 MM HG: CPT | Performed by: INTERNAL MEDICINE

## 2023-11-01 RX ORDER — ICOSAPENT ETHYL 1 G/1
2 CAPSULE ORAL 2 TIMES DAILY
Qty: 360 CAPSULE | Refills: 3 | Status: SHIPPED | OUTPATIENT
Start: 2023-11-01 | End: 2024-10-31

## 2023-11-01 RX ORDER — ROSUVASTATIN CALCIUM 10 MG/1
10 TABLET, COATED ORAL NIGHTLY
Qty: 90 TABLET | Refills: 3 | Status: SHIPPED | OUTPATIENT
Start: 2023-11-01 | End: 2024-10-31

## 2023-11-01 NOTE — PROGRESS NOTES
Last OV 12/2022 :  Subjective :     Overall doing well.  Blood pressures are running low especially in dialysis and sometimes at other times.  Today blood pressure 88/58 sitting and standing 78 mmHg.  Takes Primatene at 10:30 AM and 1:30 PM on days of dialysis.  On auscultation, I thought his heart was irregular, hence an EKG was obtained, it was reviewed it shows sinus rhythm with first-degree AV block, MD interval 232 ms QRS duration 158 ms QTc 486 ms right bundle branch block is noted.  There is also loss of R wave progression across the anterior precordial leads, no significant change compared to EKG of July 2021.  Except that MD interval has increased from 210 ms to 232 ms.      History so Far :  1. Hypertension -patient is no longer on antihypertensive agents, dialysis may have negated the need for these agents, now on ProAmatine as needed arterial hypotension.  2. The patient 1st seen in January of 2016 regarding slow heart rate  and irregular heart rate, this has since resolved.  3. Right bundle-branch block.  4. Remote history of seizures.  5. Status post palatopharyngeal plasty for obstructive sleep apnea.  6. Compliant with CPAP therapy.  7. Increased body mass index.  8. Echo November 2015, left aortic root size 4.3 cm. Left atrial  volume index 31 mL/m2. LV ejection fraction 55%, 1 to 2+ mitral  regurgitation, diastolic dysfunction.  9. Perfusion imaging study November 2015 normal, LV ejection  fraction 60%.  10. Cardionet 2016, frequent ventricular premature beats sometimes  in a pattern of bigeminy, first-degree AV block, and QRS widening.  11. The ectopy was always towards the end of dialysis, and  subsequently the etiology of this is identified to be a drop in the  potassium towards the end of dialysis.  12. Holter monitor July 2017 sinus mechanism throughout average rate 76 bpm minimum 52 bpm maximum 118 bpm. Frequent ventricular premature beats comprising 8% of the total QRS complexes. 85  triplets 700 couplets. Longest pause 1.6 seconds. No symptoms reported.  13. Right bundle branch block, asymptomatic bradycardia, trifascicular block, seen by electrophysiology on 8/17/17, now follows up on a when necessary basis. Pacemaker was not recommended.  14. Echocardiogram February 2020-LVEF 55-60%, mild concentric left ventricular hypertrophy, borderline diastolic filling, 0-1+ tricuspid regurgitation, RVSP 22 mmHg, echogenic area adjacent to the pulmonic valve most likely artifact cannot rule out thrombus  15.Patient has right bundle branch block left anterior fascicular block and left atrial abnormality based on EKG of July 2021. QRS duration 154 ms  ms  16. Concern for bradycardia during dialysis sessions, patient believes that this was an erroneous alarm, he is totally asymptomatic.  17. Echocardiogram May 2022-LVEF 55 to 60% ectopy and poor visualization affected reliability of information mild left ventricular hypertrophy mildly enlarged right ventricle RV systolic pressure 39 mmHg trivial pericardial effusion plaque in the ascending aorta calcified aortic valve with small gradient 20 mm peak 12mmHg mean probably mild aortic stenosis, compared to 2019 echo probably no significant change except mild increase in gradient across the aortic valve left atrial diameter 4.9 cm left atrial volume index 27.5 mL/m²  18. 48-hour Holter monitor May 2022 basic rhythm sinus with bundle branch block minimum rate 46 bpm maximum rate 85 bpm average rate 68 bpm very frequent PVCs close to 16% of total beats 3 runs of ventricular tachycardia longest 4 beats however there were 164 ventricular triplets and thousand ventricular couplets there appeared to be 2 foci occasional junctional escape beat is noted possibly indicating some element of sinoatrial node disease first-degree AV block indicating some element of AV node disease  19. Lexiscan Myoview May 2022-normal perfusion LVEF 41%, probably underestimated  ejection fraction, correlate with alternate imaging.    Objective      Wt Readings from Last 3 Encounters:   11/01/23 131 kg (289 lb)   12/28/22 136 kg (300 lb)   05/18/22 (!) 145 kg (319 lb)            Physical Exam:    patient is alert oriented x3, answers questions appropriately, lungs are clear heart sounds are regular ,distant,grade 1 /6 systolic murmur is heard over left sternal border, there is a functioning AV fistula in the left forearm. Extremities show no edema.    Meds:  Current Outpatient Medications   Medication Instructions    aspirin 81 mg EC tablet 1 tablet, oral, Daily    B complex-vitamin C-folic acid (Moraima-Micki) 0.8 mg tablet TAKE AS DIRECTED.    calcium acetate (Phoslo) 667 mg tablet TAKE 3 TABLETS WITH EACH MEAL.    CeleBREX 200 mg, oral, Daily RT    cinacalcet (Sensipar) 30 mg tablet TAKE 1 TABLET IN THE EVENING DAILY WITH FOOD    gabapentin (Neurontin) 300 mg capsule 1 capsule, oral, Nightly    icosapent ethyL (VASCEPA) 2 g, oral, 2 times daily    levETIRAcetam (Keppra) 250 mg tablet TAKE 1 1/2 TABLET TWICE DAILY.    midodrine (PROAMATINE) 10 mg, oral, Below 110    rosuvastatin (CRESTOR) 10 mg, oral, Nightly          No Known Allergies    LABS:    Lab Results   Component Value Date    WBC 8.5 07/08/2021    HGB 10.6 (L) 07/08/2021    HCT 37.2 (L) 07/08/2021     07/08/2021     (L) 07/08/2021    K 4.8 07/08/2021    CL 91 (L) 07/08/2021    CREATININE 12.47 (H) 07/08/2021    BUN 72 (H) 07/08/2021    CO2 27 07/08/2021    INR 1.1 07/08/2021    HGBA1C 6.6 (H) 08/27/2021              Lipid profile October 2023-triglycerides 128 HDL 37, total cholesterol 123, LDL 60    Problem List:    Patient Active Problem List    Diagnosis Date Noted    Abnormal EKG 11/01/2023    Hemodialysis-associated hypotension 11/01/2023    Hypertension 10/31/2023    Morbid obesity with BMI of 40.0-44.9, adult (CMS/[facility]) 10/31/2023    Aneurysm of arteriovenous dialysis fistula (CMS/HCC) 10/23/2023    ESRD (end stage  renal disease) on dialysis (CMS/[facility]) 10/23/2023    Essential hypertension 10/23/2023    First-degree atrioventricular block 10/23/2023    Hypotension due to hypovolemia 10/23/2023    Hyperlipidemia with target LDL less than 100 10/23/2023    Mixed hyperlipidemia 10/23/2023    Neurogenic claudication due to lumbar spinal stenosis 10/23/2023    Premature ventricular contractions 10/23/2023    Bifascicular block 10/23/2023    Right bundle branch block (RBBB) 10/23/2023    Sinus bradycardia 10/23/2023    Sleep apnea, obstructive 10/23/2023    Dysautonomia (CMS/[facility]) 09/25/2023    Asterixis 09/21/2023    Type 2 diabetes mellitus with chronic kidney disease (CMS/HCC) 09/15/2023    Hypertensive heart and chronic kidney disease stage 5 (CMS/[facility]) 05/12/2023    Myoclonus 05/12/2023    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    Orthostatic hypotension 05/09/2023    Pruritus, unspecified 04/07/2023    Compression of vein 12/08/2022    Other disorders of plasma-protein metabolism, not elsewhere classified 09/27/2022    Hyperkalemia 06/16/2022    Lumbar spondylosis 05/17/2022    Abnormality of albumin 04/27/2022    Hypotension 03/21/2022    Renal neuropathy (CMS/HCC) 02/14/2022    Tremor 10/23/2021    Hyperlipidemia, unspecified 10/19/2021    Other seizures (CMS/HCC) 10/15/2021    Aneurysm of arteriovenous fistula (CMS/[facility]) 08/24/2021    First degree atrioventricular block 08/24/2021    Ventricular premature beats 08/24/2021    Other disorders of phosphorus metabolism 08/13/2021    Hypercalcemia 07/19/2021    Allergy, unspecified, initial encounter 06/03/2021    Anaphylactic shock, unspecified, initial encounter 06/03/2021    Chest pain, unspecified 06/03/2021    Diarrhea, unspecified 06/03/2021    Nausea 06/03/2021    Adenomatous polyp of descending colon 02/06/2020    CRI (chronic renal insufficiency), stage 5 (CMS/[facility]) 01/20/2020    Multiple renal cysts 01/20/2020    Obstructive sleep apnea syndrome 01/20/2020    Edema of lower  extremity 01/03/2020    Other forms of dyspnea 10/26/2019    Other fatigue 08/21/2019    Hypotension of hemodialysis 08/21/2019    BPH without obstruction/lower urinary tract symptoms 05/09/2019    Chronic midline low back pain without sciatica 05/09/2019    Extrarenal uremia 06/15/2018    Dialysis patient (CMS/HCC) 03/07/2017    Angina pectoris, unspecified (CMS/HCC) 01/12/2016    Fever, unspecified 01/12/2016    Headache, unspecified 01/12/2016    Hypoglycemia, unspecified 01/12/2016    Cramp and spasm 01/12/2016    Bradycardia, unspecified 11/18/2015    IFG (impaired fasting glucose) 11/12/2015    Neuropathy 11/12/2015    Nonspecific abnormal electrocardiogram (ECG) (EKG) 11/12/2015    Shortness of breath 09/18/2015    Other specified coagulation defects (CMS/HCC) 08/14/2015    Coagulation disorder (CMS/HCC) 07/31/2015    Disorder of phosphorus metabolism, unspecified 04/04/2011    Essential (primary) hypertension 11/23/2009    Secondary hyperparathyroidism of renal origin (CMS/HCC) 02/12/2009    Iron deficiency anemia 12/22/2008    Anemia in chronic kidney disease 11/22/2008    Pain, unspecified 11/22/2008    Other disorders resulting from impaired renal tubular function 06/30/2008    Polycystic kidney, adult type 05/30/2008    Morbidly obese (CMS/HCC) 05/01/2007    Polycystic kidney disease 05/01/2007    End stage renal failure on dialysis (CMS/HCC) 05/01/2007                 Assessment:    Arterial hypotension and orthostatic hypotension  Dizziness related to orthostatic hypotension and arterial hypotension  End-stage renal disease on hemodialysis  Prior history of hypertension  First-degree AV block and right bundle branch block on EKG with QRS widening  Hyperlipidemia-at target  Obstructive sleep apnea on CPAP therapy    Orders Placed This Encounter   Procedures    Comprehensive metabolic panel    Lipid panel     Patient should take ProAmatine 5 mg morning and mid afternoon or late afternoon, on the days  that he does not get dialysis.  If blood pressure still remains below 90, he can increase the ProAmatine to 10 mg morning and late afternoon every day.  On dialysis days he might need 15 mg.    Follow up : 1 year      Sally Schulte MD

## 2023-12-22 DIAGNOSIS — E78.5 HYPERLIPIDEMIA WITH TARGET LDL LESS THAN 100: ICD-10-CM

## 2023-12-23 RX ORDER — ICOSAPENT ETHYL 1000 MG/1
2 CAPSULE ORAL
Qty: 120 CAPSULE | Refills: 11 | Status: SHIPPED | OUTPATIENT
Start: 2023-12-23 | End: 2024-12-22

## 2023-12-27 ENCOUNTER — HOSPITAL ENCOUNTER (OUTPATIENT)
Dept: ULTRASOUND IMAGING | Age: 68
Discharge: HOME | End: 2023-12-29
Attending: STUDENT IN AN ORGANIZED HEALTH CARE EDUCATION/TRAINING PROGRAM
Payer: MEDICARE

## 2023-12-27 DIAGNOSIS — I70.213 ATHEROSCLEROSIS OF NATIVE ARTERY OF BOTH LOWER EXTREMITIES WITH INTERMITTENT CLAUDICATION (HCC): ICD-10-CM

## 2023-12-27 PROCEDURE — 93925 LOWER EXTREMITY STUDY: CPT

## 2024-01-12 ENCOUNTER — OFFICE VISIT (OUTPATIENT)
Dept: CARDIOLOGY CLINIC | Age: 69
End: 2024-01-12

## 2024-01-12 VITALS
HEART RATE: 75 BPM | WEIGHT: 285.6 LBS | DIASTOLIC BLOOD PRESSURE: 62 MMHG | SYSTOLIC BLOOD PRESSURE: 116 MMHG | OXYGEN SATURATION: 94 % | HEIGHT: 71 IN | BODY MASS INDEX: 39.98 KG/M2

## 2024-01-12 DIAGNOSIS — K51.418 INFLAMMATORY POLYPS OF COLON WITH OTHER COMPLICATION (HCC): ICD-10-CM

## 2024-01-12 DIAGNOSIS — I20.9 ANGINA PECTORIS, UNSPECIFIED (HCC): ICD-10-CM

## 2024-01-12 DIAGNOSIS — M47.816 LUMBAR SPONDYLOSIS: ICD-10-CM

## 2024-01-12 DIAGNOSIS — E66.01 MORBIDLY OBESE (HCC): ICD-10-CM

## 2024-01-12 DIAGNOSIS — Q61.3 POLYCYSTIC KIDNEY DISEASE: ICD-10-CM

## 2024-01-12 DIAGNOSIS — E66.01 SEVERE OBESITY (BMI 35.0-39.9) WITH COMORBIDITY (HCC): ICD-10-CM

## 2024-01-12 DIAGNOSIS — D12.4 ADENOMATOUS POLYP OF DESCENDING COLON: ICD-10-CM

## 2024-01-12 DIAGNOSIS — Z00.00 PE (PHYSICAL EXAM), ANNUAL: Primary | ICD-10-CM

## 2024-01-12 DIAGNOSIS — G47.33 OSA (OBSTRUCTIVE SLEEP APNEA): ICD-10-CM

## 2024-01-12 DIAGNOSIS — N18.5 CRI (CHRONIC RENAL INSUFFICIENCY), STAGE 5 (HCC): ICD-10-CM

## 2024-01-12 DIAGNOSIS — Z99.2 DIALYSIS PATIENT (HCC): ICD-10-CM

## 2024-01-12 DIAGNOSIS — I10 ESSENTIAL HYPERTENSION: ICD-10-CM

## 2024-01-12 DIAGNOSIS — I70.213 ATHEROSCLEROSIS OF NATIVE ARTERY OF BOTH LOWER EXTREMITIES WITH INTERMITTENT CLAUDICATION (HCC): ICD-10-CM

## 2024-01-12 ASSESSMENT — ENCOUNTER SYMPTOMS
WHEEZING: 0
NAUSEA: 0
COUGH: 0
SHORTNESS OF BREATH: 0
CHEST TIGHTNESS: 0
GASTROINTESTINAL NEGATIVE: 1
BLOOD IN STOOL: 0
EYES NEGATIVE: 1
RESPIRATORY NEGATIVE: 1
STRIDOR: 0

## 2024-01-12 NOTE — PROGRESS NOTES
NEW PATIENT        Patient: Ashok Zheng  YOB: 1955  MRN: [id number]    Chief Complaint: dizzy  Chief Complaint   Patient presents with    New Patient     Previous Dr. Wyatt pt       CV Data:  ESRD -HD  10/22 Echo EF 60   5/22 SPEC UH no ischemia EF 44  12/23 LE Art - negative.     Subjective/HPI Prior NOH changing. Pt has no CAD PAD. He has Hypotension with HD. No falsl no lbeed. Not very active due to back pain. 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      Past Medical History:   Diagnosis Date    Chronic kidney disease     Hemodialysis patient (HCC) 05/2008    MWF    Hyperlipidemia     Hypertension     Renal failure     Sleep apnea        Past Surgical History:   Procedure Laterality Date    COLONOSCOPY      Polyps 2010, none in 2015    COLONOSCOPY N/A 2/6/2020    COLORECTAL CANCER SCREENING, HIGH RISK performed by Janis Dozier MD at [facility]    DIALYSIS FISTULA CREATION Left 2008    TONSILLECTOMY AND ADENOIDECTOMY      UVULECTOMY         Family History   Problem Relation Age of Onset    Kidney Disease Mother     Cancer Father     Heart Attack Father        Social History     Socioeconomic History    Marital status:      Spouse name: None    Number of children: None    Years of education: None    Highest education level: None   Tobacco Use    Smoking status: Never    Smokeless tobacco: Never   Vaping Use    Vaping Use: Never used   Substance and Sexual Activity    Alcohol use: Not Currently    Drug use: Not Currently    Sexual activity: Yes     Social Determinants of Health     Financial Resource Strain: Low Risk  (2/17/2023)    Overall Financial Resource Strain (CARDIA)     Difficulty of Paying Living Expenses: Not very hard   Transportation Needs: Unknown (2/17/2023)    PRAPARE - Transportation     Lack of Transportation (Non-Medical): No   Physical Activity:

## 2024-01-31 ENCOUNTER — OFFICE VISIT (OUTPATIENT)
Dept: PULMONOLOGY | Age: 69
End: 2024-01-31
Payer: MEDICARE

## 2024-01-31 VITALS
WEIGHT: 285.5 LBS | DIASTOLIC BLOOD PRESSURE: 68 MMHG | BODY MASS INDEX: 39.97 KG/M2 | TEMPERATURE: 97.8 F | HEIGHT: 71 IN | OXYGEN SATURATION: 96 % | HEART RATE: 73 BPM | SYSTOLIC BLOOD PRESSURE: 120 MMHG

## 2024-01-31 DIAGNOSIS — E66.9 OBESITY, UNSPECIFIED CLASSIFICATION, UNSPECIFIED OBESITY TYPE, UNSPECIFIED WHETHER SERIOUS COMORBIDITY PRESENT: ICD-10-CM

## 2024-01-31 DIAGNOSIS — G47.34 SLEEP RELATED HYPOXIA: ICD-10-CM

## 2024-01-31 DIAGNOSIS — G47.33 OSA ON CPAP: Primary | ICD-10-CM

## 2024-01-31 PROCEDURE — 3074F SYST BP LT 130 MM HG: CPT | Performed by: INTERNAL MEDICINE

## 2024-01-31 PROCEDURE — 1036F TOBACCO NON-USER: CPT | Performed by: INTERNAL MEDICINE

## 2024-01-31 PROCEDURE — G8417 CALC BMI ABV UP PARAM F/U: HCPCS | Performed by: INTERNAL MEDICINE

## 2024-01-31 PROCEDURE — G8484 FLU IMMUNIZE NO ADMIN: HCPCS | Performed by: INTERNAL MEDICINE

## 2024-01-31 PROCEDURE — 3017F COLORECTAL CA SCREEN DOC REV: CPT | Performed by: INTERNAL MEDICINE

## 2024-01-31 PROCEDURE — 1123F ACP DISCUSS/DSCN MKR DOCD: CPT | Performed by: INTERNAL MEDICINE

## 2024-01-31 PROCEDURE — G8427 DOCREV CUR MEDS BY ELIG CLIN: HCPCS | Performed by: INTERNAL MEDICINE

## 2024-01-31 PROCEDURE — 3078F DIAST BP <80 MM HG: CPT | Performed by: INTERNAL MEDICINE

## 2024-01-31 PROCEDURE — 99203 OFFICE O/P NEW LOW 30 MIN: CPT | Performed by: INTERNAL MEDICINE

## 2024-01-31 NOTE — PROGRESS NOTES
FISTULA CREATION Left 2008    TONSILLECTOMY AND ADENOIDECTOMY      UVULECTOMY         Allergies  No Known Allergies    Medications  Current Outpatient Medications   Medication Sig Dispense Refill    levETIRAcetam (KEPPRA) 250 MG tablet Take 1.5 tablets by mouth 2 times daily 270 tablet 1    Difelikefalin Acetate (KORSUVA) 65 MCG/1.3ML SOLN 1.3 mLs      celecoxib (CELEBREX) 200 MG capsule TAKE 1 CAPSULE BY MOUTH  DAILY 90 capsule 1    rosuvastatin (CRESTOR) 10 MG tablet TAKE 1 TABLET BY MOUTH  DAILY 90 tablet 3    midodrine (PROAMATINE) 10 MG tablet Take 1 tablet by mouth      CPAP Machine MISC by Does not apply route Disp Cap, Hose,Face Mask and filter every three months 1 each 3    Respiratory Therapy Supplies (NEBULIZER/TUBING/MOUTHPIECE) KIT Uses nightly 1 kit 0    Calcium Acetate, Phos Binder, 667 MG CAPS Take 2 capsules by mouth 3 times daily      B Complex-C-Folic Acid (KAYE-LEILANI) TABS TAKE 1 TABLET BY MOUTH EVERY DAY      VASCEPA 1 g CAPS capsule TK 2 CS PO BID      cinacalcet (SENSIPAR) 30 MG tablet TAKE 1 TABLET BY MOUTH ONCE A DAY AS DIRECTED WITH THE EVENING MEAL      aspirin 81 MG EC tablet Take by mouth      gabapentin (NEURONTIN) 300 MG capsule TAKE 1 CAPSULE BY MOUTH  DAILY 90 capsule 3     No current facility-administered medications for this visit.       Social History  Social History     Tobacco Use    Smoking status: Never    Smokeless tobacco: Never   Substance Use Topics    Alcohol use: Not Currently       Family History  Family History   Problem Relation Age of Onset    Kidney Disease Mother     Cancer Father     Heart Attack Father        Review of Systems  All review of systems has been obtained and negative other than what was mentionedin HPI.   Physical Exam                 Vitals:    01/31/24 1358   BP: 120/68   Pulse: 73   Temp: 97.8 °F (36.6 °C)   TempSrc: Tympanic   SpO2: 96%   Weight: 129.5 kg (285 lb 7.9 oz)   Height: 1.803 m (5' 10.98\")          General appearance: Well appearing. No

## 2024-03-05 ENCOUNTER — TELEMEDICINE (OUTPATIENT)
Dept: FAMILY MEDICINE CLINIC | Age: 69
End: 2024-03-05
Payer: MEDICARE

## 2024-03-05 DIAGNOSIS — Z00.00 MEDICARE ANNUAL WELLNESS VISIT, SUBSEQUENT: Primary | ICD-10-CM

## 2024-03-05 PROCEDURE — 1123F ACP DISCUSS/DSCN MKR DOCD: CPT | Performed by: NURSE PRACTITIONER

## 2024-03-05 PROCEDURE — G8484 FLU IMMUNIZE NO ADMIN: HCPCS | Performed by: NURSE PRACTITIONER

## 2024-03-05 PROCEDURE — G0439 PPPS, SUBSEQ VISIT: HCPCS | Performed by: NURSE PRACTITIONER

## 2024-03-05 PROCEDURE — 3017F COLORECTAL CA SCREEN DOC REV: CPT | Performed by: NURSE PRACTITIONER

## 2024-03-05 RX ORDER — LANTHANUM CARBONATE 1000 MG/1
TABLET, CHEWABLE ORAL
COMMUNITY
Start: 2024-01-26

## 2024-03-05 SDOH — ECONOMIC STABILITY: FOOD INSECURITY: WITHIN THE PAST 12 MONTHS, THE FOOD YOU BOUGHT JUST DIDN'T LAST AND YOU DIDN'T HAVE MONEY TO GET MORE.: NEVER TRUE

## 2024-03-05 SDOH — ECONOMIC STABILITY: FOOD INSECURITY: WITHIN THE PAST 12 MONTHS, YOU WORRIED THAT YOUR FOOD WOULD RUN OUT BEFORE YOU GOT MONEY TO BUY MORE.: NEVER TRUE

## 2024-03-05 SDOH — HEALTH STABILITY: PHYSICAL HEALTH
ON AVERAGE, HOW MANY DAYS PER WEEK DO YOU ENGAGE IN MODERATE TO STRENUOUS EXERCISE (LIKE A BRISK WALK)?: PATIENT DECLINED

## 2024-03-05 SDOH — ECONOMIC STABILITY: INCOME INSECURITY: HOW HARD IS IT FOR YOU TO PAY FOR THE VERY BASICS LIKE FOOD, HOUSING, MEDICAL CARE, AND HEATING?: NOT HARD AT ALL

## 2024-03-05 ASSESSMENT — PATIENT HEALTH QUESTIONNAIRE - PHQ9
SUM OF ALL RESPONSES TO PHQ QUESTIONS 1-9: 0
1. LITTLE INTEREST OR PLEASURE IN DOING THINGS: 0
SUM OF ALL RESPONSES TO PHQ QUESTIONS 1-9: 0
SUM OF ALL RESPONSES TO PHQ9 QUESTIONS 1 & 2: 0
SUM OF ALL RESPONSES TO PHQ QUESTIONS 1-9: 0
SUM OF ALL RESPONSES TO PHQ9 QUESTIONS 1 & 2: 0
1. LITTLE INTEREST OR PLEASURE IN DOING THINGS: 0
SUM OF ALL RESPONSES TO PHQ QUESTIONS 1-9: 0
2. FEELING DOWN, DEPRESSED OR HOPELESS: 0
2. FEELING DOWN, DEPRESSED OR HOPELESS: 0
SUM OF ALL RESPONSES TO PHQ QUESTIONS 1-9: 0
SUM OF ALL RESPONSES TO PHQ QUESTIONS 1-9: 0

## 2024-03-05 ASSESSMENT — LIFESTYLE VARIABLES
HOW MANY STANDARD DRINKS CONTAINING ALCOHOL DO YOU HAVE ON A TYPICAL DAY: PATIENT DOES NOT DRINK
HOW OFTEN DO YOU HAVE A DRINK CONTAINING ALCOHOL: 1
HOW MANY STANDARD DRINKS CONTAINING ALCOHOL DO YOU HAVE ON A TYPICAL DAY: 0
HOW OFTEN DO YOU HAVE SIX OR MORE DRINKS ON ONE OCCASION: 1
HOW OFTEN DO YOU HAVE A DRINK CONTAINING ALCOHOL: NEVER
HOW MANY STANDARD DRINKS CONTAINING ALCOHOL DO YOU HAVE ON A TYPICAL DAY: PATIENT DOES NOT DRINK
HOW OFTEN DO YOU HAVE A DRINK CONTAINING ALCOHOL: NEVER

## 2024-03-05 NOTE — PROGRESS NOTES
co-pays. This Virtual Visit was conducted with patient's (and/or legal guardian's) consent. Patient identification was verified, and a caregiver was present when appropriate.   The patient was located at Home: [id number] [place] [id number]  Provider was located at Facility (Appt Dept): [id number] [id number]

## 2024-03-05 NOTE — PATIENT INSTRUCTIONS
with diet alone, but a vitamin D supplement is usually necessary to meet this goal.  When exposed to the sun, use a sunscreen that protects against both UVA and UVB radiation with an SPF of 30 or greater. Reapply every 2 to 3 hours or after sweating, drying off with a towel, or swimming.  Always wear a seat belt when traveling in a car. Always wear a helmet when riding a bicycle or motorcycle.

## 2024-03-15 ENCOUNTER — OFFICE VISIT (OUTPATIENT)
Dept: FAMILY MEDICINE CLINIC | Age: 69
End: 2024-03-15
Payer: MEDICARE

## 2024-03-15 ENCOUNTER — TELEPHONE (OUTPATIENT)
Dept: FAMILY MEDICINE CLINIC | Age: 69
End: 2024-03-15

## 2024-03-15 VITALS
BODY MASS INDEX: 40.04 KG/M2 | OXYGEN SATURATION: 97 % | SYSTOLIC BLOOD PRESSURE: 136 MMHG | RESPIRATION RATE: 19 BRPM | HEIGHT: 71 IN | HEART RATE: 81 BPM | WEIGHT: 286 LBS | DIASTOLIC BLOOD PRESSURE: 60 MMHG

## 2024-03-15 DIAGNOSIS — I10 ESSENTIAL HYPERTENSION: ICD-10-CM

## 2024-03-15 DIAGNOSIS — Z12.5 SCREENING PSA (PROSTATE SPECIFIC ANTIGEN): ICD-10-CM

## 2024-03-15 DIAGNOSIS — G63 RENAL NEUROPATHY (HCC): ICD-10-CM

## 2024-03-15 DIAGNOSIS — N18.31 TYPE 2 DIABETES MELLITUS WITH STAGE 3A CHRONIC KIDNEY DISEASE, WITHOUT LONG-TERM CURRENT USE OF INSULIN (HCC): ICD-10-CM

## 2024-03-15 DIAGNOSIS — E11.22 TYPE 2 DIABETES MELLITUS WITH STAGE 3A CHRONIC KIDNEY DISEASE, WITHOUT LONG-TERM CURRENT USE OF INSULIN (HCC): ICD-10-CM

## 2024-03-15 DIAGNOSIS — G90.1 DYSAUTONOMIA (HCC): ICD-10-CM

## 2024-03-15 DIAGNOSIS — I10 ESSENTIAL HYPERTENSION: Primary | ICD-10-CM

## 2024-03-15 DIAGNOSIS — N25.81 SECONDARY HYPERPARATHYROIDISM OF RENAL ORIGIN (HCC): ICD-10-CM

## 2024-03-15 DIAGNOSIS — D68.9 COAGULATION DEFECT, UNSPECIFIED (HCC): ICD-10-CM

## 2024-03-15 DIAGNOSIS — G40.89 OTHER SEIZURES (HCC): ICD-10-CM

## 2024-03-15 DIAGNOSIS — N28.9 RENAL NEUROPATHY (HCC): ICD-10-CM

## 2024-03-15 LAB
ALBUMIN SERPL-MCNC: 4.2 G/DL (ref 3.5–4.6)
ALP SERPL-CCNC: 126 U/L (ref 35–104)
ALT SERPL-CCNC: 10 U/L (ref 0–41)
ANION GAP SERPL CALCULATED.3IONS-SCNC: 18 MEQ/L (ref 9–15)
ANISOCYTOSIS BLD QL SMEAR: ABNORMAL
AST SERPL-CCNC: 17 U/L (ref 0–40)
BASOPHILS # BLD: 0.1 K/UL (ref 0–0.2)
BASOPHILS NFR BLD: 1.1 %
BILIRUB SERPL-MCNC: 0.5 MG/DL (ref 0.2–0.7)
BUN SERPL-MCNC: 34 MG/DL (ref 8–23)
CALCIUM SERPL-MCNC: 8.7 MG/DL (ref 8.5–9.9)
CHLORIDE SERPL-SCNC: 95 MEQ/L (ref 95–107)
CO2 SERPL-SCNC: 25 MEQ/L (ref 20–31)
CREAT SERPL-MCNC: 7.33 MG/DL (ref 0.7–1.2)
DACRYOCYTES BLD QL SMEAR: ABNORMAL
EOSINOPHIL # BLD: 0.2 K/UL (ref 0–0.7)
EOSINOPHIL NFR BLD: 2.6 %
ERYTHROCYTE [DISTWIDTH] IN BLOOD BY AUTOMATED COUNT: 17.9 % (ref 11.5–14.5)
GLOBULIN SER CALC-MCNC: 2.7 G/DL (ref 2.3–3.5)
GLUCOSE SERPL-MCNC: 157 MG/DL (ref 70–99)
HBA1C MFR BLD: 7 % (ref 4.8–5.9)
HCT VFR BLD AUTO: 42.9 % (ref 42–52)
HGB BLD-MCNC: 11.8 G/DL (ref 14–18)
LYMPHOCYTES # BLD: 1.2 K/UL (ref 1–4.8)
LYMPHOCYTES NFR BLD: 14.1 %
MAGNESIUM SERPL-MCNC: 2.3 MG/DL (ref 1.7–2.4)
MCH RBC QN AUTO: 22.1 PG (ref 27–31.3)
MCHC RBC AUTO-ENTMCNC: 27.5 % (ref 33–37)
MCV RBC AUTO: 80.5 FL (ref 79–92.2)
MONOCYTES # BLD: 0.9 K/UL (ref 0.2–0.8)
MONOCYTES NFR BLD: 10.7 %
NEUTROPHILS # BLD: 6.1 K/UL (ref 1.4–6.5)
NEUTS SEG NFR BLD: 71.1 %
OVALOCYTES BLD QL SMEAR: ABNORMAL
PLATELET # BLD AUTO: 317 K/UL (ref 130–400)
PLATELET BLD QL SMEAR: ADEQUATE
POIKILOCYTOSIS BLD QL SMEAR: ABNORMAL
POTASSIUM SERPL-SCNC: 4 MEQ/L (ref 3.4–4.9)
PROT SERPL-MCNC: 6.9 G/DL (ref 6.3–8)
PSA SERPL-MCNC: 2.21 NG/ML (ref 0–4)
RBC # BLD AUTO: 5.33 M/UL (ref 4.7–6.1)
SLIDE REVIEW: ABNORMAL
SODIUM SERPL-SCNC: 138 MEQ/L (ref 135–144)
WBC # BLD AUTO: 8.5 K/UL (ref 4.8–10.8)

## 2024-03-15 PROCEDURE — 3051F HG A1C>EQUAL 7.0%<8.0%: CPT | Performed by: FAMILY MEDICINE

## 2024-03-15 PROCEDURE — 3075F SYST BP GE 130 - 139MM HG: CPT | Performed by: FAMILY MEDICINE

## 2024-03-15 PROCEDURE — 3078F DIAST BP <80 MM HG: CPT | Performed by: FAMILY MEDICINE

## 2024-03-15 PROCEDURE — G8417 CALC BMI ABV UP PARAM F/U: HCPCS | Performed by: FAMILY MEDICINE

## 2024-03-15 PROCEDURE — 1036F TOBACCO NON-USER: CPT | Performed by: FAMILY MEDICINE

## 2024-03-15 PROCEDURE — 99213 OFFICE O/P EST LOW 20 MIN: CPT | Performed by: FAMILY MEDICINE

## 2024-03-15 PROCEDURE — 1123F ACP DISCUSS/DSCN MKR DOCD: CPT | Performed by: FAMILY MEDICINE

## 2024-03-15 PROCEDURE — 3017F COLORECTAL CA SCREEN DOC REV: CPT | Performed by: FAMILY MEDICINE

## 2024-03-15 PROCEDURE — G8484 FLU IMMUNIZE NO ADMIN: HCPCS | Performed by: FAMILY MEDICINE

## 2024-03-15 PROCEDURE — G8427 DOCREV CUR MEDS BY ELIG CLIN: HCPCS | Performed by: FAMILY MEDICINE

## 2024-03-15 PROCEDURE — 2022F DILAT RTA XM EVC RTNOPTHY: CPT | Performed by: FAMILY MEDICINE

## 2024-03-15 NOTE — TELEPHONE ENCOUNTER
Teresita from [facility] called- to report critical lab  Creatinine 7.33

## 2024-03-15 NOTE — PROGRESS NOTES
Patient is seen in follow up for   Chief Complaint   Patient presents with   • Check-Up   • Hypertension     Hypertension  This is a chronic problem. The current episode started more than 1 year ago. The problem is unchanged. The problem is controlled. Pertinent negatives include no chest pain, palpitations or shortness of breath. There are no associated agents to hypertension. Risk factors for coronary artery disease include diabetes mellitus, male gender and obesity. Past treatments include lifestyle changes and diuretics. The current treatment provides moderate improvement. There are no compliance problems.        Past Medical History:   Diagnosis Date   • Chronic kidney disease    • Hemodialysis patient ([facility]) 05/2008    MWF   • Hyperlipidemia    • Hypertension    • Renal failure    • Sleep apnea      Patient Active Problem List    Diagnosis Date Noted   • Lumbar spondylosis 05/17/2022   • Atherosclerosis of native artery of both lower extremities with intermittent claudication ([facility]) 01/12/2024   • Dysautonomia ([facility]) 09/25/2023   • Asterixis 09/21/2023   • Type 2 diabetes mellitus with chronic kidney disease 09/15/2023   • Type 2 diabetes mellitus with diabetic neuropathy 09/15/2023   • 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   • Hypertensive heart and chronic kidney disease stage 5 ([facility]) 05/12/2023   • Adult body mass index 40 and over 05/12/2023   • Myoclonus 05/12/2023   • Pruritus, unspecified 04/07/2023   • Compression of vein 12/08/2022   • Other disorders of plasma-protein metabolism, not elsewhere classified 09/27/2022   • Hyperkalemia 06/16/2022   • Mixed hyperlipidemia 03/21/2022   • Renal neuropathy ([facility]) 02/14/2022   • Tremor    • Hyperlipidemia, unspecified 10/19/2021   • Unspecified convulsions 10/15/2021   • Other seizures 10/15/2021   • Edema of lower extremity 08/24/2021   • First degree atrioventricular block 08/24/2021   • Sinus bradycardia 08/24/2021   • Ventricular premature beats 08/24/2021

## 2024-04-02 RX ORDER — LEVETIRACETAM 250 MG/1
TABLET ORAL
Qty: 270 TABLET | Refills: 3 | Status: SHIPPED | OUTPATIENT
Start: 2024-04-02

## 2024-04-02 NOTE — TELEPHONE ENCOUNTER
Pharmacy is  requesting medication refill. Please approve or deny this request.    Rx requested:  Requested Prescriptions     Pending Prescriptions Disp Refills    levETIRAcetam (KEPPRA) 250 MG tablet [Pharmacy Med Name: LEVETIRACETAM  250MG  TAB] 270 tablet 3     Sig: TAKE 1 AND 1/2 TABLETS BY MOUTH  TWICE DAILY         Last Office Visit:   9/25/2023      Next Visit Date:  Future Appointments   Date Time Provider Department Center   8/30/2024 11:30 AM Joseph Villavicencio MD [facility]   9/23/2024  2:00 PM Vladislav Booker MD [place] [facility] Neurology -   1/15/2025  2:15 PM Ab Kent MD [place] [facility]

## 2024-06-29 DIAGNOSIS — G62.9 NEUROPATHY: ICD-10-CM

## 2024-07-01 RX ORDER — GABAPENTIN 300 MG/1
CAPSULE ORAL
Qty: 90 CAPSULE | Refills: 3 | Status: SHIPPED | OUTPATIENT
Start: 2024-07-01 | End: 2024-09-29

## 2024-07-28 ENCOUNTER — PATIENT MESSAGE (OUTPATIENT)
Dept: FAMILY MEDICINE CLINIC | Age: 69
End: 2024-07-28

## 2024-07-28 DIAGNOSIS — G62.9 NEUROPATHY: ICD-10-CM

## 2024-07-29 RX ORDER — GABAPENTIN 300 MG/1
300 CAPSULE ORAL DAILY
Qty: 90 CAPSULE | Refills: 0 | Status: SHIPPED | OUTPATIENT
Start: 2024-07-29 | End: 2024-10-27

## 2024-07-29 RX ORDER — CELECOXIB 200 MG/1
200 CAPSULE ORAL DAILY
Qty: 90 CAPSULE | Refills: 1 | Status: SHIPPED | OUTPATIENT
Start: 2024-07-29

## 2024-07-29 NOTE — TELEPHONE ENCOUNTER
From: Ashok Zheng  To: Dr. Joseph Villavicencio  Sent: 7/28/2024 5:28 PM EDT  Subject: Refills needed    Could you please send to [facility] refills for Gabapentin Capsules, 300 mg and Celecoxib capsules, 200 mg. Thank you.  These are 90 day refills.

## 2024-08-30 ENCOUNTER — OFFICE VISIT (OUTPATIENT)
Dept: FAMILY MEDICINE CLINIC | Age: 69
End: 2024-08-30
Payer: MEDICARE

## 2024-08-30 VITALS
HEIGHT: 71 IN | HEART RATE: 88 BPM | BODY MASS INDEX: 38.5 KG/M2 | WEIGHT: 275 LBS | RESPIRATION RATE: 18 BRPM | SYSTOLIC BLOOD PRESSURE: 114 MMHG | OXYGEN SATURATION: 96 % | DIASTOLIC BLOOD PRESSURE: 58 MMHG

## 2024-08-30 DIAGNOSIS — Z79.4 TYPE 2 DIABETES MELLITUS WITH CHRONIC KIDNEY DISEASE ON CHRONIC DIALYSIS, WITH LONG-TERM CURRENT USE OF INSULIN (HCC): Primary | ICD-10-CM

## 2024-08-30 DIAGNOSIS — I10 ESSENTIAL HYPERTENSION: ICD-10-CM

## 2024-08-30 DIAGNOSIS — E11.22 TYPE 2 DIABETES MELLITUS WITH STAGE 3A CHRONIC KIDNEY DISEASE, WITHOUT LONG-TERM CURRENT USE OF INSULIN (HCC): ICD-10-CM

## 2024-08-30 DIAGNOSIS — Z99.2 TYPE 2 DIABETES MELLITUS WITH CHRONIC KIDNEY DISEASE ON CHRONIC DIALYSIS, WITH LONG-TERM CURRENT USE OF INSULIN (HCC): Primary | ICD-10-CM

## 2024-08-30 DIAGNOSIS — E11.22 TYPE 2 DIABETES MELLITUS WITH CHRONIC KIDNEY DISEASE ON CHRONIC DIALYSIS, WITH LONG-TERM CURRENT USE OF INSULIN (HCC): Primary | ICD-10-CM

## 2024-08-30 DIAGNOSIS — N18.31 TYPE 2 DIABETES MELLITUS WITH STAGE 3A CHRONIC KIDNEY DISEASE, WITHOUT LONG-TERM CURRENT USE OF INSULIN (HCC): ICD-10-CM

## 2024-08-30 DIAGNOSIS — Z99.2 DIALYSIS PATIENT (HCC): ICD-10-CM

## 2024-08-30 DIAGNOSIS — N18.6 TYPE 2 DIABETES MELLITUS WITH CHRONIC KIDNEY DISEASE ON CHRONIC DIALYSIS, WITH LONG-TERM CURRENT USE OF INSULIN (HCC): Primary | ICD-10-CM

## 2024-08-30 LAB — HBA1C MFR BLD: 6.3 %

## 2024-08-30 PROCEDURE — 99214 OFFICE O/P EST MOD 30 MIN: CPT | Performed by: FAMILY MEDICINE

## 2024-08-30 PROCEDURE — 3074F SYST BP LT 130 MM HG: CPT | Performed by: FAMILY MEDICINE

## 2024-08-30 PROCEDURE — G8427 DOCREV CUR MEDS BY ELIG CLIN: HCPCS | Performed by: FAMILY MEDICINE

## 2024-08-30 PROCEDURE — 3044F HG A1C LEVEL LT 7.0%: CPT | Performed by: FAMILY MEDICINE

## 2024-08-30 PROCEDURE — 3078F DIAST BP <80 MM HG: CPT | Performed by: FAMILY MEDICINE

## 2024-08-30 PROCEDURE — 83036 HEMOGLOBIN GLYCOSYLATED A1C: CPT | Performed by: FAMILY MEDICINE

## 2024-08-30 PROCEDURE — 1036F TOBACCO NON-USER: CPT | Performed by: FAMILY MEDICINE

## 2024-08-30 PROCEDURE — G8417 CALC BMI ABV UP PARAM F/U: HCPCS | Performed by: FAMILY MEDICINE

## 2024-08-30 PROCEDURE — 1123F ACP DISCUSS/DSCN MKR DOCD: CPT | Performed by: FAMILY MEDICINE

## 2024-08-30 PROCEDURE — 2022F DILAT RTA XM EVC RTNOPTHY: CPT | Performed by: FAMILY MEDICINE

## 2024-08-30 PROCEDURE — 3017F COLORECTAL CA SCREEN DOC REV: CPT | Performed by: FAMILY MEDICINE

## 2024-09-04 ENCOUNTER — APPOINTMENT (OUTPATIENT)
Dept: CARDIOLOGY | Facility: CLINIC | Age: 69
End: 2024-09-04
Payer: MEDICARE

## 2024-09-18 PROBLEM — I45.2 BIFASCICULAR BLOCK: Status: ACTIVE | Noted: 2023-10-23

## 2024-09-18 PROBLEM — R73.9 HYPERGLYCEMIA, UNSPECIFIED: Status: ACTIVE | Noted: 2023-12-19

## 2024-09-23 ENCOUNTER — OFFICE VISIT (OUTPATIENT)
Dept: NEUROLOGY | Age: 69
End: 2024-09-23
Payer: MEDICARE

## 2024-09-23 VITALS
WEIGHT: 277 LBS | DIASTOLIC BLOOD PRESSURE: 64 MMHG | BODY MASS INDEX: 38.63 KG/M2 | HEART RATE: 89 BPM | SYSTOLIC BLOOD PRESSURE: 120 MMHG

## 2024-09-23 DIAGNOSIS — R27.0 ATAXIA: ICD-10-CM

## 2024-09-23 DIAGNOSIS — R20.2 PARESTHESIA: ICD-10-CM

## 2024-09-23 DIAGNOSIS — G90.1 DYSAUTONOMIA (HCC): ICD-10-CM

## 2024-09-23 DIAGNOSIS — G25.3 MYOCLONIC JERKING: Primary | ICD-10-CM

## 2024-09-23 DIAGNOSIS — G62.9 NEUROPATHY: ICD-10-CM

## 2024-09-23 PROCEDURE — 1123F ACP DISCUSS/DSCN MKR DOCD: CPT | Performed by: PSYCHIATRY & NEUROLOGY

## 2024-09-23 PROCEDURE — 3078F DIAST BP <80 MM HG: CPT | Performed by: PSYCHIATRY & NEUROLOGY

## 2024-09-23 PROCEDURE — G8417 CALC BMI ABV UP PARAM F/U: HCPCS | Performed by: PSYCHIATRY & NEUROLOGY

## 2024-09-23 PROCEDURE — G8427 DOCREV CUR MEDS BY ELIG CLIN: HCPCS | Performed by: PSYCHIATRY & NEUROLOGY

## 2024-09-23 PROCEDURE — 3017F COLORECTAL CA SCREEN DOC REV: CPT | Performed by: PSYCHIATRY & NEUROLOGY

## 2024-09-23 PROCEDURE — 99214 OFFICE O/P EST MOD 30 MIN: CPT | Performed by: PSYCHIATRY & NEUROLOGY

## 2024-09-23 PROCEDURE — 1036F TOBACCO NON-USER: CPT | Performed by: PSYCHIATRY & NEUROLOGY

## 2024-09-23 PROCEDURE — 3074F SYST BP LT 130 MM HG: CPT | Performed by: PSYCHIATRY & NEUROLOGY

## 2024-09-23 RX ORDER — GABAPENTIN 300 MG/1
CAPSULE ORAL
Qty: 180 CAPSULE | Refills: 3 | Status: SHIPPED | OUTPATIENT
Start: 2024-09-23 | End: 2024-10-23

## 2024-09-23 NOTE — PROGRESS NOTES
2. Neuropathy  gabapentin (NEURONTIN) 300 MG capsule      3. Dysautonomia (HCC)        4. Ataxia        5. Paresthesia        Myoclonic jerking related to azotemia.  Patient actually doing well on Keppra 375 mg twice a day.  Patient's main issues appears to his neuropathy.  He has numbness in his lower EXTR with some tingling.  This is worsening and is only on gabapentin 300 mg at night.  The etiology of his neuropathy is unclear he has been investigated by [facility] as I was told and we were not able to access records as this was prior to the times of Psychiatric.  Patient has been on gabapentin for a while.  Recommended that we increase this to 600 mg at night.    An EMG was noted of his upper extremity which showed the neuropathy as well.    Patient has dysautonom an ia though is not any loss of consciousness continues on midodrine 1 a day.  We are monitoring the    Vladislav Booker MD, FLOR  Diplomate, American Board of Psychiatry & Neurology  Board Certified in Vascular Neurology  Board Certified in Neuromuscular Medicine  Certified in Neurorehabilitation         Plan:      No orders of the defined types were placed in this encounter.    Orders Placed This Encounter   Medications    gabapentin (NEURONTIN) 300 MG capsule     Si to be taken at night     Dispense:  180 capsule     Refill:  3     Please send a replace/new response with 90-Day Supply if appropriate to maximize member benefit. Requesting 1 year supply.       Return in about 1 year (around 2025).      Vladislav Booker MD

## 2024-11-12 ENCOUNTER — DOCUMENTATION (OUTPATIENT)
Dept: TRANSPLANT | Facility: HOSPITAL | Age: 69
End: 2024-11-12
Payer: MEDICARE

## 2024-11-12 ENCOUNTER — TELEPHONE (OUTPATIENT)
Dept: TRANSPLANT | Facility: HOSPITAL | Age: 69
End: 2024-11-12
Payer: MEDICARE

## 2024-11-12 VITALS — WEIGHT: 279.98 LBS | HEIGHT: 72 IN | BODY MASS INDEX: 37.92 KG/M2

## 2024-11-21 ENCOUNTER — TELEPHONE (OUTPATIENT)
Dept: INTERVENTIONAL RADIOLOGY/VASCULAR | Age: 69
End: 2024-11-21

## 2024-11-21 DIAGNOSIS — N18.6 ESRD (END STAGE RENAL DISEASE) ON DIALYSIS (HCC): Primary | ICD-10-CM

## 2024-11-21 DIAGNOSIS — N18.6 ESRD (END STAGE RENAL DISEASE) ON DIALYSIS (HCC): ICD-10-CM

## 2024-11-21 DIAGNOSIS — Z99.2 ESRD (END STAGE RENAL DISEASE) ON DIALYSIS (HCC): Primary | ICD-10-CM

## 2024-11-21 DIAGNOSIS — T82.590A MALFUNCTION OF ARTERIOVENOUS DIALYSIS FISTULA, INITIAL ENCOUNTER (HCC): ICD-10-CM

## 2024-11-21 DIAGNOSIS — Z99.2 ESRD (END STAGE RENAL DISEASE) ON DIALYSIS (HCC): ICD-10-CM

## 2024-11-21 LAB
ERYTHROCYTE [DISTWIDTH] IN BLOOD BY AUTOMATED COUNT: 18.9 % (ref 11.5–14.5)
HCT VFR BLD AUTO: 45.3 % (ref 42–52)
HGB BLD-MCNC: 13.6 G/DL (ref 14–18)
INR PPP: 1
MCH RBC QN AUTO: 23.6 PG (ref 27–31.3)
MCHC RBC AUTO-ENTMCNC: 30 % (ref 33–37)
MCV RBC AUTO: 78.5 FL (ref 79–92.2)
PLATELET # BLD AUTO: 281 K/UL (ref 130–400)
PROTHROMBIN TIME: 13.6 SEC (ref 12.3–14.9)
RBC # BLD AUTO: 5.77 M/UL (ref 4.7–6.1)
WBC # BLD AUTO: 11.6 K/UL (ref 4.8–10.8)

## 2024-11-21 NOTE — TELEPHONE ENCOUNTER
Kim at [facility] was given this information via phone conversation - voiced understanding  2.  Spoke to Rayne in [facility] to schedule procedure    >  Left upper extremity fistulagram is scheduled on 11/22/2024 @ 1:00 pm   >  You will need to arrive at 12:00 pm from home and check in at the Diagnostic Imaging Check In desk.   >  Do not eat or drink after midnight.    >  Make arrangements for transportation, as you should not drive immediately after.  >  Patient to have PT/INR and CBC drawn anytime between now and 1 day prior to procedure

## 2024-11-22 ENCOUNTER — HOSPITAL ENCOUNTER (OUTPATIENT)
Age: 69
Discharge: HOME | End: 2024-11-24

## 2024-11-22 ENCOUNTER — HOSPITAL ENCOUNTER (OUTPATIENT)
Dept: INTERVENTIONAL RADIOLOGY/VASCULAR | Age: 69
End: 2024-11-22
Payer: MEDICARE

## 2024-11-22 ENCOUNTER — HOSPITAL ENCOUNTER (OUTPATIENT)
Dept: ULTRASOUND IMAGING | Age: 69
End: 2024-11-22
Payer: MEDICARE

## 2024-11-22 VITALS
OXYGEN SATURATION: 95 % | HEART RATE: 67 BPM | TEMPERATURE: 98.9 F | DIASTOLIC BLOOD PRESSURE: 57 MMHG | RESPIRATION RATE: 16 BRPM | SYSTOLIC BLOOD PRESSURE: 111 MMHG

## 2024-11-22 DIAGNOSIS — N18.6 ESRD (END STAGE RENAL DISEASE) ON DIALYSIS (HCC): ICD-10-CM

## 2024-11-22 DIAGNOSIS — T82.590A MALFUNCTION OF ARTERIOVENOUS DIALYSIS FISTULA, INITIAL ENCOUNTER (HCC): ICD-10-CM

## 2024-11-22 DIAGNOSIS — Z99.2 ESRD (END STAGE RENAL DISEASE) ON DIALYSIS (HCC): ICD-10-CM

## 2024-11-22 PROCEDURE — 36902 INTRO CATH DIALYSIS CIRCUIT: CPT | Performed by: RADIOLOGY

## 2024-11-22 PROCEDURE — 99152 MOD SED SAME PHYS/QHP 5/>YRS: CPT | Performed by: RADIOLOGY

## 2024-11-22 PROCEDURE — 6360000002 HC RX W HCPCS: Performed by: RADIOLOGY

## 2024-11-22 PROCEDURE — 2580000003 HC RX 258: Performed by: RADIOLOGY

## 2024-11-22 PROCEDURE — C1769 GUIDE WIRE: HCPCS

## 2024-11-22 PROCEDURE — 99153 MOD SED SAME PHYS/QHP EA: CPT

## 2024-11-22 PROCEDURE — 93990 DOPPLER FLOW TESTING: CPT

## 2024-11-22 PROCEDURE — 6360000004 HC RX CONTRAST MEDICATION: Performed by: RADIOLOGY

## 2024-11-22 PROCEDURE — 76937 US GUIDE VASCULAR ACCESS: CPT | Performed by: RADIOLOGY

## 2024-11-22 PROCEDURE — 36902 INTRO CATH DIALYSIS CIRCUIT: CPT

## 2024-11-22 PROCEDURE — 7100000010 HC PHASE II RECOVERY - FIRST 15 MIN

## 2024-11-22 PROCEDURE — 99204 OFFICE O/P NEW MOD 45 MIN: CPT | Performed by: RADIOLOGY

## 2024-11-22 PROCEDURE — 76937 US GUIDE VASCULAR ACCESS: CPT

## 2024-11-22 PROCEDURE — 7100000011 HC PHASE II RECOVERY - ADDTL 15 MIN

## 2024-11-22 PROCEDURE — A4217 STERILE WATER/SALINE, 500 ML: HCPCS | Performed by: RADIOLOGY

## 2024-11-22 RX ORDER — HEPARIN SODIUM 1000 [USP'U]/ML
INJECTION, SOLUTION INTRAVENOUS; SUBCUTANEOUS PRN
Status: COMPLETED | OUTPATIENT
Start: 2024-11-22 | End: 2024-11-22

## 2024-11-22 RX ORDER — FENTANYL CITRATE 0.05 MG/ML
INJECTION, SOLUTION INTRAMUSCULAR; INTRAVENOUS PRN
Status: COMPLETED | OUTPATIENT
Start: 2024-11-22 | End: 2024-11-22

## 2024-11-22 RX ORDER — LIDOCAINE HYDROCHLORIDE 20 MG/ML
INJECTION, SOLUTION INFILTRATION; PERINEURAL PRN
Status: COMPLETED | OUTPATIENT
Start: 2024-11-22 | End: 2024-11-22

## 2024-11-22 RX ORDER — MAGNESIUM HYDROXIDE 1200 MG/15ML
LIQUID ORAL CONTINUOUS PRN
Status: COMPLETED | OUTPATIENT
Start: 2024-11-22 | End: 2024-11-22

## 2024-11-22 RX ORDER — MIDAZOLAM HYDROCHLORIDE 2 MG/2ML
INJECTION, SOLUTION INTRAMUSCULAR; INTRAVENOUS PRN
Status: COMPLETED | OUTPATIENT
Start: 2024-11-22 | End: 2024-11-22

## 2024-11-22 RX ORDER — IODIXANOL 320 MG/ML
INJECTION, SOLUTION INTRAVASCULAR PRN
Status: COMPLETED | OUTPATIENT
Start: 2024-11-22 | End: 2024-11-22

## 2024-11-22 RX ORDER — 0.9 % SODIUM CHLORIDE 0.9 %
INTRAVENOUS SOLUTION INTRAVENOUS CONTINUOUS PRN
Status: COMPLETED | OUTPATIENT
Start: 2024-11-22 | End: 2024-11-22

## 2024-11-22 RX ADMIN — LIDOCAINE HYDROCHLORIDE 4 ML: 20 INJECTION, SOLUTION INFILTRATION; PERINEURAL at 13:41

## 2024-11-22 RX ADMIN — FENTANYL CITRATE 50 MCG: 50 INJECTION INTRAMUSCULAR; INTRAVENOUS at 13:37

## 2024-11-22 RX ADMIN — HEPARIN SODIUM 4000 UNITS: 1000 INJECTION INTRAVENOUS; SUBCUTANEOUS at 13:58

## 2024-11-22 RX ADMIN — IODIXANOL 75 ML: 320 INJECTION, SOLUTION INTRAVASCULAR at 14:22

## 2024-11-22 RX ADMIN — SODIUM CHLORIDE 1000 ML: 900 IRRIGANT IRRIGATION at 13:38

## 2024-11-22 RX ADMIN — HEPARIN SODIUM 1000 UNITS: 1000 INJECTION INTRAVENOUS; SUBCUTANEOUS at 13:38

## 2024-11-22 RX ADMIN — SODIUM CHLORIDE 250 ML: 9 INJECTION, SOLUTION INTRAVENOUS at 13:37

## 2024-11-22 RX ADMIN — MIDAZOLAM HYDROCHLORIDE 0.5 MG: 1 INJECTION, SOLUTION INTRAMUSCULAR; INTRAVENOUS at 13:37

## 2024-11-22 ASSESSMENT — ENCOUNTER SYMPTOMS
VOMITING: 0
WHEEZING: 0
EYES NEGATIVE: 1
CONSTIPATION: 0
RESPIRATORY NEGATIVE: 1
DIARRHEA: 0
SHORTNESS OF BREATH: 0
ABDOMINAL PAIN: 0
NAUSEA: 0
COUGH: 0
GASTROINTESTINAL NEGATIVE: 1
BACK PAIN: 0
PHOTOPHOBIA: 0

## 2024-11-22 NOTE — PROGRESS NOTES
Pt returned to CT holding. Alert, oriented. Denies pain. Dressing to left forearm CD&I, surrounding tissue soft. Pt given ginger ale and drinking without difficulty.

## 2024-11-22 NOTE — PROGRESS NOTES
Pt to CT holding ambulatory with wheeled walker. Son in waiting room. Pt alert, oriented. Denies pain or any other complaint. Meds, allergies and history reviewed. Left arm surgical fistula with +thrill. Consent signed.     1237 Dr. Gonzalez notified that pt is ready to be seen    1240 Left axilla clipped of hair.     1308 Dr. Gonzalez in to see patient. Procedure discussed, including risks/benefits. All questions answered.     1315 Pt taken to cath lab by SATHYA Ogden.

## 2024-11-22 NOTE — DISCHARGE INSTRUCTIONS
Interventional Radiology Fistulagram Discharge Instructions:    Activity:  Rest, avoid strenuous activity such as bending or lifting heavy objects.     Diet:  Resume usual diet     Medication:     * Resume home medication unless otherwise instructed by your physician.     * (If Applicable) If taking any blood thinners or Aspirin, speak with your physician before restarting them.     * May take mild pain reliever, as needed, such as Acetaminophen or Ibuprofen.           INSTRUCTIONS OR COMMENTS RELATIVE TO SPECIFIC EXAM PERFORMED:       - DO NOT USE FISTULA ARM FISTULA FOR HEMODIALYSIS FOR 2 WEEKS. (If liposuction performed) otherwise when ok per vinay    - Wear ace wrap for 48 hours. (If liposuction performed)    - Dressing can be removed after 48 hours. (If liposuction performed)    - Dressing and sutures can be removed at dialysis center at next dialysis treatment.    - May shower after dressing and wrap removed. To dry, pat the site with a dry cloth.     - Do not lift anything over 10 pounds for the next 24 - 48 hours. (2 weeks for creation)    - Monitor the site for the next 48 hours.     - For large amount of bleeding or drainage, Go to ER for evaluation.     - If any signs of infection (redness, swelling, drainage/pus) at the site, Go to ER for evaluation.         IF YOU DEVELOP ANY OF THE FOLLOWING SYMPTOMS, CONTACT PHYSICIAN LISTED BELOW:     Physician performing procedure: DR. JOHNSON - [phone] or [phone], Ask to be put in contact with Dr. Johnson or Radiology Nurse.  After hours contact ER.     * Extreme pain that increases after leaving the hospital     * Active bleeding (refer to above instructions)     * Chills, fever above 100 degrees Fahrenheit and fatigue.     * Weakness, dizziness, lightheadedness or fainting.           If you are unable to contact the above physician and you feel you have a major problem, please go to the Emergency Room for treatment.

## 2024-11-22 NOTE — H&P
CC: Referred by dialysis center for decrease ability to dialyze through left arm fistula with decreased clearance numbers.    HPI: Patient is a pleasant 69-year-old gentleman with end-stage kidney disease, has been on dialysis for many years.  He has a left forearm surgical fistula which was placed over 9 years ago and then had a surgical revision.  Fistula had been working well, though a couple of days ago they began to experience decreased clearance numbers at the dialysis center and patient was referred urgently to evaluate the fistula and treat the problem preventing proper dialysis blood filtration.  Patient denies any pain or discomfort or any other complaints.    Family History   Problem Relation Age of Onset    Kidney Disease Mother     Cancer Father     Heart Attack Father        Past Surgical History:   Procedure Laterality Date    COLONOSCOPY      Polyps 2010, none in 2015    COLONOSCOPY N/A 2/6/2020    COLORECTAL CANCER SCREENING, HIGH RISK performed by Janis Dozier MD at [facility]    DIALYSIS FISTULA CREATION Left 2008    TONSILLECTOMY AND ADENOIDECTOMY      UVULECTOMY          Past Medical History:   Diagnosis Date    Chronic kidney disease     Hemodialysis patient (HCC) 05/2008    MWF    Hyperlipidemia     Hypertension     Renal failure     Sleep apnea        Social History     Socioeconomic History    Marital status:      Spouse name: None    Number of children: None    Years of education: None    Highest education level: None   Tobacco Use    Smoking status: Never    Smokeless tobacco: Never   Vaping Use    Vaping status: Never Used   Substance and Sexual Activity    Alcohol use: Not Currently    Drug use: Not Currently    Sexual activity: Yes     Social Determinants of Health     Financial Resource Strain: Low Risk  (3/5/2024)    Overall Financial Resource Strain (CARDIA)     Difficulty of Paying Living Expenses: Not hard at all   Transportation Needs: Unknown (3/5/2024)          General: No tenderness or deformity.        Arms:       Cervical back: Neck supple.      Comments: Strong pulse in the peripheral portion of the fistula and then followed by low thrill going more centrally, indicative of a stenosis between the 2 spots.   Skin:     General: Skin is dry.      Coloration: Skin is not pale.      Findings: No erythema or rash.   Neurological:      Mental Status: He is alert and oriented to person, place, and time.      Cranial Nerves: No cranial nerve deficit.   Psychiatric:         Behavior: Behavior normal.         Thought Content: Thought content normal.         Judgment: Judgment normal.         Lab Results   Component Value Date/Time    WBC 11.6 11/21/2024 04:11 PM    HGB 13.6 11/21/2024 04:11 PM    HCT 45.3 11/21/2024 04:11 PM     11/21/2024 04:11 PM    MCV 78.5 11/21/2024 04:11 PM           ASSESSMENT ANDPLAN:    Assessment: Patient with left forearm fistula, placed 9 years ago for end-stage kidney disease.  Over the past few days they have been experiencing decreased filtration numbers at the dialysis center, and patient was referred for evaluation and treatment of the left arm fistula problem.    Plan: Left arm ultrasound and fistulogram with likely angioplasty of the outflow vein, possible stenting or thrombectomy.  Patient understands that if we are unable to repair the fistula, we will place a tunneled dialysis catheter.  He understands the risks of bleeding, infection, damage to adjacent organs or blood vessels and wishes to proceed.    JUANA JOHNSON MD, MAGGIE

## 2024-11-22 NOTE — OR NURSING
SEDATION ADMINISTERED    1317 - Pt arrived to cath lab 1 via cart and monitors from CT holding. Pt A&Ox4, calm and cooperative. Pt transferred self onto procedure table and in supine position with minimal assistance. 3L NC/end tidal CO2 applied. VSS. Reassurance and support given. Consent confirmed for fistulogram with intervention if needed.     Rad tech, positioned Left arm out laterally on board.    1328 - Dr. Gonzalez arrived and evaluating Left arm using Ultrasound guidance. Access site marked.      1336 - Timeout for procedure performed.     Left arm prepped circumferentially with large tinted chloraprep from wrist to chest per NH, Rad tech. After 3 minute dry time, Rad tech draped site using full body drape in sterile fashion.    1337 - Conscious sedation meds administered by SARA, RN, independent observer, see eMar.    1341 - Dr. Gonzalez, using U/S guidance, numbed site using lidocaine 2%, see eMar.    1342 - Dr. Gonzalez, using U/S guidance, accessed site and maintained access using 5F mp introducer set.     Dr. Gonzalez then hand injected contrast via transition sheath and fluoro images taken and reviewed.     Dr. Gonzalez then inserted Bentson 0.035\" x 150cm with fluoro imaging.    Dr. Gonzalez exchanged transition sheath for 6F brite tip sheath, blood aspirated and flushed well.    1350 - Glidewire Angled 0.035\" x 180cm inserted through sheath. Berenstein catheter 5F x 40cm inserted over the wire. Patient resting comfortably on procedure table, conversing with SATHYA RUIZ. No complaints / distress.     1352 - Patient instructed to hold breath and contrast injected for fluoro imaging, tolerated well. Measurement of vessel being obtained.     1356 - Sheath exchanged for 7F brite tip.     1358 - Heparin 4000 units given IVP per Dr. Gonzalez verbal order, given by SATHYA RUIZ, see eMar.    1400 - Conquest PTA dilatation catheter 9mm x 40mm used with indeflator for one minute intervals at area of stenosis.     1406 - Conquest PTA dilatation

## 2024-11-22 NOTE — PRE SEDATION
Sedation Pre-Procedure Note    Patient Name: Ashok Zheng   YOB: 1955  Room/Bed: Room/bed info not found  Medical Record Number: [id number]  Date: 11/22/2024   Time: 1:12 PM       Indication:  Left arm fistulogram    Consent: I have discussed with the patient and/or the patient representative the indication, alternatives, and the possible risks and/or complications of the planned procedure and the anesthesia methods. The patient and/or patient representative appear to understand and agree to proceed.    Vital Signs:   Vitals:    11/22/24 1232   BP: (!) 115/55   Pulse: 77   Resp: 18   Temp: 98.9 °F (37.2 °C)   SpO2: 97%       Past Medical History:   has a past medical history of Chronic kidney disease, Hemodialysis patient (HCC), Hyperlipidemia, Hypertension, Renal failure, and Sleep apnea.    Past Surgical History:   has a past surgical history that includes Tonsillectomy and adenoidectomy; Dialysis fistula creation (Left, 2008); Colonoscopy; Uvulectomy; and Colonoscopy (N/A, 2/6/2020).    Medications:   Scheduled Meds:   Continuous Infusions:   PRN Meds:   Home Meds:   Prior to Admission medications    Medication Sig Start Date End Date Taking? Authorizing Provider   gabapentin (NEURONTIN) 300 MG capsule 2 to be taken at night 9/23/24 10/23/24  Vladislav Booker MD   celecoxib (CELEBREX) 200 MG capsule Take 1 capsule by mouth daily 7/29/24   Joseph Villavicencio MD   levETIRAcetam (KEPPRA) 250 MG tablet TAKE 1 AND 1/2 TABLETS BY MOUTH  TWICE DAILY 4/2/24   Vladislav Booker MD   lanthanum (FOSRENOL) 1000 MG chewable tablet CHEW AND SWALLOW 1 TABLET BY MOUTH THREE TIMES A DAY WITH MEALS 1/26/24   Tisha Rasmussen MD   rosuvastatin (CRESTOR) 10 MG tablet TAKE 1 TABLET BY MOUTH  DAILY 6/16/23   Kiana Robert APRN - CNP   midodrine (PROAMATINE) 10 MG tablet Take 1 tablet by mouth 10/27/22   Tisha Rasmussen MD   CPAP Machine MISC by Does not apply route Disp Cap, Hose,Face Mask and filter every

## 2024-11-22 NOTE — FLOWSHEET NOTE
1443 - Patient eating lunch tray and tolerating well. Denies pain or complaints. Son, Satya brought back to sit at cart side. Patient noted to have a few PVC intermittently on monitor. Reviewed Dr. Kent's previous cardiology noted. Patient denies any chest discomfort or SOB.     Report called to Kourtney at  [facility] by MCKAY, RN. Nursing communication order faxed.     1450 - Dressing to LUE remains clean / dry / intact. No bleeding / drainage / swelling noted. Radial pulse palpable. VSS. Dr. Gonzalez present in CT holding and shown EKG rhythm. Dr. Gonzalez called and spoke with Dr. Kent - plan is for patient to discharge home and call to make appointment in office ASAP.     1504 - VS remain stable. No complaints. Procedural site remains WNL. IV out and monitor off. Patient getting dressed.     1509 - D/C instructions reviewed and understanding indicated.     1512 - To hospital entrance via WC. Picked up by son who will drive him home. Electronically signed by Melvi Traore RN on 11/22/2024 at 3:12 PM

## 2024-11-25 ENCOUNTER — TELEPHONE (OUTPATIENT)
Dept: INTERVENTIONAL RADIOLOGY/VASCULAR | Age: 69
End: 2024-11-25

## 2024-11-25 PROBLEM — T82.590A DIALYSIS AV FISTULA MALFUNCTION (HCC): Status: ACTIVE | Noted: 2024-11-25

## 2024-11-25 NOTE — TELEPHONE ENCOUNTER
Follow up call made to patient after fistulagram on 11/22/24. Pt unavailable but doing well, just sore per wife. Rad phone number given to call back with any questions or concerns.

## 2024-12-12 RX ORDER — CELECOXIB 200 MG/1
200 CAPSULE ORAL DAILY
Qty: 90 CAPSULE | Refills: 1 | Status: SHIPPED | OUTPATIENT
Start: 2024-12-12

## 2024-12-12 RX ORDER — ROSUVASTATIN CALCIUM 10 MG/1
10 TABLET, COATED ORAL DAILY
Qty: 90 TABLET | Refills: 3 | Status: SHIPPED | OUTPATIENT
Start: 2024-12-12

## 2025-01-01 ENCOUNTER — APPOINTMENT (OUTPATIENT)
Age: 70
DRG: 853 | End: 2025-01-01
Attending: INTERNAL MEDICINE
Payer: MEDICARE

## 2025-01-01 ENCOUNTER — APPOINTMENT (OUTPATIENT)
Dept: GENERAL RADIOLOGY | Age: 70
DRG: 853 | End: 2025-01-01
Payer: MEDICARE

## 2025-01-01 ENCOUNTER — HOSPITAL ENCOUNTER (INPATIENT)
Age: 70
LOS: 2 days | Discharge: ANOTHER HOSPITAL | DRG: 853 | End: 2025-08-16
Attending: EMERGENCY MEDICINE | Admitting: INTERNAL MEDICINE
Payer: MEDICARE

## 2025-01-01 ENCOUNTER — APPOINTMENT (OUTPATIENT)
Dept: CT IMAGING | Age: 70
DRG: 853 | End: 2025-01-01
Payer: MEDICARE

## 2025-01-01 VITALS
HEART RATE: 82 BPM | TEMPERATURE: 98 F | HEIGHT: 72 IN | SYSTOLIC BLOOD PRESSURE: 103 MMHG | BODY MASS INDEX: 34.64 KG/M2 | OXYGEN SATURATION: 100 % | DIASTOLIC BLOOD PRESSURE: 38 MMHG | RESPIRATION RATE: 24 BRPM | WEIGHT: 255.73 LBS

## 2025-01-01 DIAGNOSIS — N18.6 ESRD ON HEMODIALYSIS (HCC): ICD-10-CM

## 2025-01-01 DIAGNOSIS — R18.8 CIRRHOSIS OF LIVER WITH ASCITES, UNSPECIFIED HEPATIC CIRRHOSIS TYPE (HCC): ICD-10-CM

## 2025-01-01 DIAGNOSIS — I95.9 HYPOTENSION, UNSPECIFIED HYPOTENSION TYPE: ICD-10-CM

## 2025-01-01 DIAGNOSIS — R29.6 FREQUENT FALLS: Primary | ICD-10-CM

## 2025-01-01 DIAGNOSIS — Z86.69 H/O NEUROPATHY: ICD-10-CM

## 2025-01-01 DIAGNOSIS — K74.60 CIRRHOSIS OF LIVER WITH ASCITES, UNSPECIFIED HEPATIC CIRRHOSIS TYPE (HCC): ICD-10-CM

## 2025-01-01 DIAGNOSIS — Z99.2 ESRD ON HEMODIALYSIS (HCC): ICD-10-CM

## 2025-01-01 DIAGNOSIS — I21.3 STEMI (ST ELEVATION MYOCARDIAL INFARCTION) (HCC): ICD-10-CM

## 2025-01-01 DIAGNOSIS — I49.9 CARDIAC ARRHYTHMIA, UNSPECIFIED CARDIAC ARRHYTHMIA TYPE: ICD-10-CM

## 2025-01-01 DIAGNOSIS — D72.829 LEUKOCYTOSIS, UNSPECIFIED TYPE: ICD-10-CM

## 2025-01-01 DIAGNOSIS — R06.02 SHORTNESS OF BREATH: ICD-10-CM

## 2025-01-01 LAB
ALBUMIN FLUID: 3.1 G/DL
ALBUMIN SERPL-MCNC: 3.4 G/DL (ref 3.5–4.6)
ALBUMIN SERPL-MCNC: 3.5 G/DL (ref 3.5–4.6)
ALBUMIN SERPL-MCNC: 3.8 G/DL (ref 3.5–4.6)
ALBUMIN SERPL-MCNC: 4.1 G/DL (ref 3.5–4.6)
ALBUMIN SERPL-MCNC: 4.5 G/DL (ref 3.5–4.6)
ALP SERPL-CCNC: 48 U/L (ref 35–104)
ALP SERPL-CCNC: 53 U/L (ref 35–104)
ALP SERPL-CCNC: 54 U/L (ref 35–104)
ALP SERPL-CCNC: 67 U/L (ref 35–104)
ALP SERPL-CCNC: 80 U/L (ref 35–104)
ALT SERPL-CCNC: 6 U/L (ref 0–41)
ALT SERPL-CCNC: 9 U/L (ref 0–41)
ALT SERPL-CCNC: <5 U/L (ref 0–41)
AMYLASE FLUID: 13 U/L
ANION GAP SERPL CALCULATED.3IONS-SCNC: 16 MEQ/L (ref 9–15)
ANION GAP SERPL CALCULATED.3IONS-SCNC: 17 MEQ/L (ref 9–15)
ANION GAP SERPL CALCULATED.3IONS-SCNC: 19 MEQ/L (ref 9–15)
ANION GAP SERPL CALCULATED.3IONS-SCNC: 19 MEQ/L (ref 9–15)
ANION GAP SERPL CALCULATED.3IONS-SCNC: 20 MEQ/L (ref 9–15)
ANION GAP SERPL CALCULATED.3IONS-SCNC: 20 MEQ/L (ref 9–15)
ANISOCYTOSIS BLD QL SMEAR: ABNORMAL
ANISOCYTOSIS BLD QL SMEAR: ABNORMAL
ANTI-XA UNFRAC HEPARIN: 0.12 IU/ML
ANTI-XA UNFRAC HEPARIN: 0.19 IU/ML
ANTI-XA UNFRAC HEPARIN: 0.96 IU/ML
ANTI-XA UNFRAC HEPARIN: <0.1 IU/ML
APPEARANCE FLUID: NORMAL
APTT PPP: 30.1 SEC (ref 24.4–36.8)
APTT PPP: 32.3 SEC (ref 24.4–36.8)
AST SERPL-CCNC: 12 U/L (ref 0–40)
AST SERPL-CCNC: 13 U/L (ref 0–40)
AST SERPL-CCNC: 14 U/L (ref 0–40)
AST SERPL-CCNC: 16 U/L (ref 0–40)
AST SERPL-CCNC: 17 U/L (ref 0–40)
BASOPHILS # BLD: 0 K/UL (ref 0–0.2)
BASOPHILS # BLD: 0 K/UL (ref 0–0.2)
BASOPHILS # BLD: 0.1 K/UL (ref 0–0.2)
BASOPHILS NFR BLD: 0.6 %
BASOPHILS NFR BLD: 0.6 %
BASOPHILS NFR BLD: 0.7 %
BILIRUB SERPL-MCNC: 0.7 MG/DL (ref 0.2–0.7)
BILIRUB SERPL-MCNC: 0.8 MG/DL (ref 0.2–0.7)
BILIRUB SERPL-MCNC: 0.9 MG/DL (ref 0.2–0.7)
BILIRUB SERPL-MCNC: 0.9 MG/DL (ref 0.2–0.7)
BILIRUB SERPL-MCNC: 1.1 MG/DL (ref 0.2–0.7)
BNP BLD-MCNC: 5705 PG/ML
BUN SERPL-MCNC: 17 MG/DL (ref 8–23)
BUN SERPL-MCNC: 18 MG/DL (ref 8–23)
BUN SERPL-MCNC: 22 MG/DL (ref 8–23)
BUN SERPL-MCNC: 22 MG/DL (ref 8–23)
BUN SERPL-MCNC: 29 MG/DL (ref 8–23)
BUN SERPL-MCNC: 31 MG/DL (ref 8–23)
C DIFF TOX A+B STL QL IA: NORMAL
C DIFF TOX GENS STL QL NAA+PROBE: ABNORMAL
CALCIUM SERPL-MCNC: 10.1 MG/DL (ref 8.5–9.9)
CALCIUM SERPL-MCNC: 10.2 MG/DL (ref 8.5–9.9)
CALCIUM SERPL-MCNC: 10.6 MG/DL (ref 8.5–9.9)
CALCIUM SERPL-MCNC: 9.8 MG/DL (ref 8.5–9.9)
CALCIUM SERPL-MCNC: 9.9 MG/DL (ref 8.5–9.9)
CALCIUM SERPL-MCNC: 9.9 MG/DL (ref 8.5–9.9)
CELL COUNT FLUID TYPE: NORMAL
CHLORIDE SERPL-SCNC: 87 MEQ/L (ref 95–107)
CHLORIDE SERPL-SCNC: 89 MEQ/L (ref 95–107)
CHLORIDE SERPL-SCNC: 90 MEQ/L (ref 95–107)
CHLORIDE SERPL-SCNC: 92 MEQ/L (ref 95–107)
CHLORIDE SERPL-SCNC: 92 MEQ/L (ref 95–107)
CHLORIDE SERPL-SCNC: 93 MEQ/L (ref 95–107)
CLOT EVALUATION: NORMAL
CO2 SERPL-SCNC: 26 MEQ/L (ref 20–31)
CO2 SERPL-SCNC: 27 MEQ/L (ref 20–31)
CO2 SERPL-SCNC: 27 MEQ/L (ref 20–31)
CO2 SERPL-SCNC: 28 MEQ/L (ref 20–31)
CO2 SERPL-SCNC: 29 MEQ/L (ref 20–31)
CO2 SERPL-SCNC: 31 MEQ/L (ref 20–31)
COLOR FLUID: NORMAL
CREAT SERPL-MCNC: 3.65 MG/DL (ref 0.7–1.2)
CREAT SERPL-MCNC: 3.95 MG/DL (ref 0.7–1.2)
CREAT SERPL-MCNC: 4.24 MG/DL (ref 0.7–1.2)
CREAT SERPL-MCNC: 4.53 MG/DL (ref 0.7–1.2)
CREAT SERPL-MCNC: 5.66 MG/DL (ref 0.7–1.2)
CREAT SERPL-MCNC: 6.19 MG/DL (ref 0.7–1.2)
DACRYOCYTES BLD QL SMEAR: ABNORMAL
ECHO AO ROOT DIAM: 3.9 CM
ECHO AO ROOT INDEX: 1.65 CM/M2
ECHO AV AREA PEAK VELOCITY: 1.9 CM2
ECHO AV AREA VTI: 1.8 CM2
ECHO AV AREA/BSA PEAK VELOCITY: 0.8 CM2/M2
ECHO AV AREA/BSA VTI: 0.8 CM2/M2
ECHO AV MEAN GRADIENT: 13 MMHG
ECHO AV MEAN VELOCITY: 1.7 M/S
ECHO AV PEAK GRADIENT: 25 MMHG
ECHO AV PEAK VELOCITY: 2.6 M/S
ECHO AV VELOCITY RATIO: 0.5
ECHO AV VTI: 51 CM
ECHO BSA: 2.43 M2
ECHO BSA: 2.43 M2
ECHO EST RA PRESSURE: 3 MMHG
ECHO EST RA PRESSURE: 5 MMHG
ECHO LA DIAMETER INDEX: 1.86 CM/M2
ECHO LA DIAMETER: 4.4 CM
ECHO LA TO AORTIC ROOT RATIO: 1.13
ECHO LA VOL A-L A2C: 91 ML (ref 18–58)
ECHO LA VOL A-L A4C: 75 ML (ref 18–58)
ECHO LA VOL MOD A2C: 86 ML (ref 18–58)
ECHO LA VOL MOD A4C: 71 ML (ref 18–58)
ECHO LA VOLUME AREA LENGTH: 90 ML
ECHO LA VOLUME INDEX A-L A2C: 39 ML/M2 (ref 16–34)
ECHO LA VOLUME INDEX A-L A4C: 32 ML/M2 (ref 16–34)
ECHO LA VOLUME INDEX AREA LENGTH: 38 ML/M2 (ref 16–34)
ECHO LA VOLUME INDEX MOD A2C: 36 ML/M2 (ref 16–34)
ECHO LA VOLUME INDEX MOD A4C: 30 ML/M2 (ref 16–34)
ECHO LV E' LATERAL VELOCITY: 19.41 CM/S
ECHO LV E' SEPTAL VELOCITY: 10.58 CM/S
ECHO LV EDV A2C: 197 ML
ECHO LV EDV A4C: 113 ML
ECHO LV EDV BP: 159 ML (ref 67–155)
ECHO LV EDV INDEX A4C: 48 ML/M2
ECHO LV EDV INDEX BP: 67 ML/M2
ECHO LV EDV NDEX A2C: 83 ML/M2
ECHO LV EF PHYSICIAN: 15 %
ECHO LV EF PHYSICIAN: 60 %
ECHO LV EJECTION FRACTION A2C: 55 %
ECHO LV EJECTION FRACTION A4C: 47 %
ECHO LV EJECTION FRACTION BIPLANE: 52 % (ref 55–100)
ECHO LV ESV A2C: 89 ML
ECHO LV ESV A4C: 60 ML
ECHO LV ESV BP: 76 ML (ref 22–58)
ECHO LV ESV INDEX A2C: 38 ML/M2
ECHO LV ESV INDEX A4C: 25 ML/M2
ECHO LV ESV INDEX BP: 32 ML/M2
ECHO LV FRACTIONAL SHORTENING: 31 % (ref 28–44)
ECHO LV INTERNAL DIMENSION DIASTOLE INDEX: 2.33 CM/M2
ECHO LV INTERNAL DIMENSION DIASTOLIC: 5.5 CM (ref 4.2–5.9)
ECHO LV INTERNAL DIMENSION SYSTOLIC INDEX: 1.61 CM/M2
ECHO LV INTERNAL DIMENSION SYSTOLIC: 3.8 CM
ECHO LV IVSD: 1.3 CM (ref 0.6–1)
ECHO LV IVSS: 1.7 CM
ECHO LV MASS 2D: 320.9 G (ref 88–224)
ECHO LV MASS INDEX 2D: 136 G/M2 (ref 49–115)
ECHO LV POSTERIOR WALL DIASTOLIC: 1.4 CM (ref 0.6–1)
ECHO LV POSTERIOR WALL SYSTOLIC: 1.7 CM
ECHO LV RELATIVE WALL THICKNESS RATIO: 0.51
ECHO LVOT AREA: 3.8 CM2
ECHO LVOT AV VTI INDEX: 0.48
ECHO LVOT DIAM: 2.2 CM
ECHO LVOT MEAN GRADIENT: 3 MMHG
ECHO LVOT PEAK GRADIENT: 5 MMHG
ECHO LVOT PEAK VELOCITY: 1.3 M/S
ECHO LVOT STROKE VOLUME INDEX: 39.3 ML/M2
ECHO LVOT SV: 92.7 ML
ECHO LVOT VTI: 24.4 CM
ECHO MV A VELOCITY: 0.71 M/S
ECHO MV AREA PHT: 2.4 CM2
ECHO MV AREA VTI: 2.9 CM2
ECHO MV E DECELERATION TIME (DT): 220.7 MS
ECHO MV E VELOCITY: 0.89 M/S
ECHO MV E/A RATIO: 1.25
ECHO MV E/E' LATERAL: 4.59
ECHO MV E/E' RATIO (AVERAGED): 6.5
ECHO MV E/E' SEPTAL: 8.41
ECHO MV LVOT VTI INDEX: 1.3
ECHO MV MAX VELOCITY: 0.9 M/S
ECHO MV MEAN GRADIENT: 2 MMHG
ECHO MV MEAN VELOCITY: 0.6 M/S
ECHO MV PEAK GRADIENT: 3 MMHG
ECHO MV PRESSURE HALF TIME (PHT): 91.3 MS
ECHO MV VTI: 31.8 CM
ECHO PULMONARY ARTERY END DIASTOLIC PRESSURE: 5 MMHG
ECHO PV MAX VELOCITY: 1.5 M/S
ECHO PV PEAK GRADIENT: 9 MMHG
ECHO PV REGURGITANT MAX VELOCITY: 1.2 M/S
ECHO RIGHT VENTRICULAR SYSTOLIC PRESSURE (RVSP): 40 MMHG
ECHO RV INTERNAL DIMENSION: 4.7 CM
ECHO RV TAPSE: 3.2 CM (ref 1.7–?)
ECHO TV REGURGITANT MAX VELOCITY: 2.96 M/S
ECHO TV REGURGITANT PEAK GRADIENT: 35 MMHG
EKG ATRIAL RATE: 57 BPM
EKG ATRIAL RATE: 70 BPM
EKG ATRIAL RATE: 78 BPM
EKG DIAGNOSIS: NORMAL
EKG P AXIS: -28 DEGREES
EKG P-R INTERVAL: 168 MS
EKG P-R INTERVAL: 256 MS
EKG Q-T INTERVAL: 366 MS
EKG Q-T INTERVAL: 478 MS
EKG Q-T INTERVAL: 528 MS
EKG QRS DURATION: 102 MS
EKG QRS DURATION: 164 MS
EKG QRS DURATION: 170 MS
EKG QTC CALCULATION (BAZETT): 445 MS
EKG QTC CALCULATION (BAZETT): 513 MS
EKG QTC CALCULATION (BAZETT): 544 MS
EKG R AXIS: -80 DEGREES
EKG R AXIS: -81 DEGREES
EKG R AXIS: 19 DEGREES
EKG T AXIS: 224 DEGREES
EKG T AXIS: 69 DEGREES
EKG T AXIS: 87 DEGREES
EKG VENTRICULAR RATE: 57 BPM
EKG VENTRICULAR RATE: 78 BPM
EKG VENTRICULAR RATE: 89 BPM
EOSINOPHIL # BLD: 0 K/UL (ref 0–0.7)
EOSINOPHIL # BLD: 0.1 K/UL (ref 0–0.7)
EOSINOPHIL # BLD: 0.2 K/UL (ref 0–0.7)
EOSINOPHIL NFR BLD: 0.3 %
EOSINOPHIL NFR BLD: 0.5 %
EOSINOPHIL NFR BLD: 1 %
ERYTHROCYTE [DISTWIDTH] IN BLOOD BY AUTOMATED COUNT: 19.9 % (ref 11.5–14.5)
ERYTHROCYTE [DISTWIDTH] IN BLOOD BY AUTOMATED COUNT: 19.9 % (ref 11.5–14.5)
ERYTHROCYTE [DISTWIDTH] IN BLOOD BY AUTOMATED COUNT: 20.2 % (ref 11.5–14.5)
ERYTHROCYTE [DISTWIDTH] IN BLOOD BY AUTOMATED COUNT: 20.5 % (ref 11.5–14.5)
ERYTHROCYTE [DISTWIDTH] IN BLOOD BY AUTOMATED COUNT: 20.7 % (ref 11.5–14.5)
FLUID PATH CONSULT: YES
FLUID TYPE: NORMAL
GLOBULIN SER CALC-MCNC: 2.2 G/DL (ref 2.3–3.5)
GLOBULIN SER CALC-MCNC: 2.4 G/DL (ref 2.3–3.5)
GLOBULIN SER CALC-MCNC: 2.6 G/DL (ref 2.3–3.5)
GLOBULIN SER CALC-MCNC: 2.7 G/DL (ref 2.3–3.5)
GLOBULIN SER CALC-MCNC: 3.5 G/DL (ref 2.3–3.5)
GLUCOSE FLD-MCNC: 90.6 MG/DL
GLUCOSE SERPL-MCNC: 106 MG/DL (ref 70–99)
GLUCOSE SERPL-MCNC: 106 MG/DL (ref 70–99)
GLUCOSE SERPL-MCNC: 167 MG/DL (ref 70–99)
GLUCOSE SERPL-MCNC: 182 MG/DL (ref 70–99)
GLUCOSE SERPL-MCNC: 193 MG/DL (ref 70–99)
GLUCOSE SERPL-MCNC: 82 MG/DL (ref 70–99)
HBV SURFACE AG SERPL QL IA: NORMAL
HCT VFR BLD AUTO: 44.1 % (ref 42–52)
HCT VFR BLD AUTO: 44.4 % (ref 42–52)
HCT VFR BLD AUTO: 48.1 % (ref 42–52)
HCT VFR BLD AUTO: 51 % (ref 42–52)
HCT VFR BLD AUTO: 52.5 % (ref 42–52)
HEPATITIS C ANTIBODY: NONREACTIVE
HGB BLD-MCNC: 13.1 G/DL (ref 14–18)
HGB BLD-MCNC: 13.5 G/DL (ref 14–18)
HGB BLD-MCNC: 14.7 G/DL (ref 14–18)
HGB BLD-MCNC: 15 G/DL (ref 14–18)
HGB BLD-MCNC: 15.3 G/DL (ref 14–18)
INR PPP: 1.1
INR PPP: 1.2
LACTATE BLDV-SCNC: 1.9 MMOL/L (ref 0.5–2.2)
LACTATE DEHYDROGENASE, FLUID: 89 U/L
LACTIC ACID, SEPSIS: 2.2 MMOL/L (ref 0.5–1.9)
LACTIC ACID, SEPSIS: 2.8 MMOL/L (ref 0.5–1.9)
LDH SERPL-CCNC: 1323 U/L (ref 135–225)
LYMPHOCYTES # BLD: 1 K/UL (ref 1–4.8)
LYMPHOCYTES # BLD: 1.2 K/UL (ref 1–4.8)
LYMPHOCYTES # BLD: 1.8 K/UL (ref 1–4.8)
LYMPHOCYTES NFR BLD: 10 %
LYMPHOCYTES NFR BLD: 4 %
LYMPHOCYTES NFR BLD: 6.3 %
LYMPHOCYTES, BODY FLUID: 34 %
MAGNESIUM SERPL-MCNC: 2.2 MG/DL (ref 1.7–2.4)
MAGNESIUM SERPL-MCNC: 2.3 MG/DL (ref 1.7–2.4)
MAGNESIUM SERPL-MCNC: 2.4 MG/DL (ref 1.7–2.4)
MAGNESIUM SERPL-MCNC: 2.4 MG/DL (ref 1.7–2.4)
MCH RBC QN AUTO: 22.9 PG (ref 27–31.3)
MCH RBC QN AUTO: 23.1 PG (ref 27–31.3)
MCH RBC QN AUTO: 23.1 PG (ref 27–31.3)
MCH RBC QN AUTO: 23.4 PG (ref 27–31.3)
MCH RBC QN AUTO: 23.6 PG (ref 27–31.3)
MCHC RBC AUTO-ENTMCNC: 29.1 % (ref 33–37)
MCHC RBC AUTO-ENTMCNC: 29.4 % (ref 33–37)
MCHC RBC AUTO-ENTMCNC: 29.7 % (ref 33–37)
MCHC RBC AUTO-ENTMCNC: 30.4 % (ref 33–37)
MCHC RBC AUTO-ENTMCNC: 30.6 % (ref 33–37)
MCV RBC AUTO: 76.8 FL (ref 79–92.2)
MCV RBC AUTO: 77.2 FL (ref 79–92.2)
MCV RBC AUTO: 77.6 FL (ref 79–92.2)
MCV RBC AUTO: 77.7 FL (ref 79–92.2)
MCV RBC AUTO: 79.3 FL (ref 79–92.2)
MICROCYTES BLD QL SMEAR: ABNORMAL
MONOCYTE, FLUID: 6 %
MONOCYTES # BLD: 1.1 K/UL (ref 0.2–0.8)
MONOCYTES # BLD: 1.4 K/UL (ref 0.2–0.8)
MONOCYTES # BLD: 1.5 K/UL (ref 0.2–0.8)
MONOCYTES NFR BLD: 5.8 %
MONOCYTES NFR BLD: 7.6 %
MONOCYTES NFR BLD: 8.6 %
NEUTROPHIL, FLUID: 60 %
NEUTROPHILS # BLD: 14.7 K/UL (ref 1.4–6.5)
NEUTROPHILS # BLD: 15.4 K/UL (ref 1.4–6.5)
NEUTROPHILS # BLD: 15.6 K/UL (ref 1.4–6.5)
NEUTS SEG NFR BLD: 84 %
NEUTS SEG NFR BLD: 84.5 %
NEUTS SEG NFR BLD: 86 %
NRBC BLD-RTO: 1 /100 WBC
NUCLEATED CELLS FLUID: 1441 /CUMM
NUMBER OF CELLS COUNTED FLUID: 100
PATH CONSULT FLUID: NORMAL
PLATELET # BLD AUTO: 387 K/UL (ref 130–400)
PLATELET # BLD AUTO: 418 K/UL (ref 130–400)
PLATELET # BLD AUTO: 432 K/UL (ref 130–400)
PLATELET # BLD AUTO: 483 K/UL (ref 130–400)
PLATELET # BLD AUTO: 502 K/UL (ref 130–400)
PLATELET BLD QL SMEAR: ABNORMAL
PLATELET BLD QL SMEAR: ADEQUATE
POC ACT LR: 296 SEC
POIKILOCYTOSIS BLD QL SMEAR: ABNORMAL
POIKILOCYTOSIS BLD QL SMEAR: ABNORMAL
POLYCHROMASIA BLD QL SMEAR: ABNORMAL
POTASSIUM SERPL-SCNC: 3.6 MEQ/L (ref 3.4–4.9)
POTASSIUM SERPL-SCNC: 3.6 MEQ/L (ref 3.4–4.9)
POTASSIUM SERPL-SCNC: 3.7 MEQ/L (ref 3.4–4.9)
POTASSIUM SERPL-SCNC: 4 MEQ/L (ref 3.4–4.9)
POTASSIUM SERPL-SCNC: 4.7 MEQ/L (ref 3.4–4.9)
POTASSIUM SERPL-SCNC: 4.8 MEQ/L (ref 3.4–4.9)
PROCALCITONIN SERPL IA-MCNC: 1.69 NG/ML (ref 0–0.15)
PROT FLD-MCNC: 4.4 G/DL
PROT SERPL-MCNC: 5.8 G/DL (ref 6.3–8)
PROT SERPL-MCNC: 6.1 G/DL (ref 6.3–8)
PROT SERPL-MCNC: 6.3 G/DL (ref 6.3–8)
PROT SERPL-MCNC: 6.5 G/DL (ref 6.3–8)
PROT SERPL-MCNC: 8 G/DL (ref 6.3–8)
PROTHROMBIN TIME: 14.4 SEC (ref 12.3–14.9)
PROTHROMBIN TIME: 15.9 SEC (ref 12.3–14.9)
RBC # BLD AUTO: 5.68 M/UL (ref 4.7–6.1)
RBC # BLD AUTO: 5.78 M/UL (ref 4.7–6.1)
RBC # BLD AUTO: 6.23 M/UL (ref 4.7–6.1)
RBC # BLD AUTO: 6.56 M/UL (ref 4.7–6.1)
RBC # BLD AUTO: 6.62 M/UL (ref 4.7–6.1)
RBC FLUID: 2000 /CUMM
SLIDE REVIEW: ABNORMAL
SMUDGE CELLS BLD QL SMEAR: 3.8
SODIUM SERPL-SCNC: 135 MEQ/L (ref 135–144)
SODIUM SERPL-SCNC: 136 MEQ/L (ref 135–144)
SODIUM SERPL-SCNC: 139 MEQ/L (ref 135–144)
SODIUM SERPL-SCNC: 140 MEQ/L (ref 135–144)
SPECIMEN TYPE: NORMAL
TROPONIN, HIGH SENSITIVITY: 117 NG/L (ref 0–19)
TROPONIN, HIGH SENSITIVITY: 124 NG/L (ref 0–19)
TROPONIN, HIGH SENSITIVITY: 131 NG/L (ref 0–19)
TROPONIN, HIGH SENSITIVITY: 145 NG/L (ref 0–19)
TSH REFLEX: 1.42 UIU/ML (ref 0.44–3.86)
VARIANT LYMPHS NFR BLD: 2 %
WBC # BLD AUTO: 17.1 K/UL (ref 4.8–10.8)
WBC # BLD AUTO: 17.4 K/UL (ref 4.8–10.8)
WBC # BLD AUTO: 17.8 K/UL (ref 4.8–10.8)
WBC # BLD AUTO: 18.3 K/UL (ref 4.8–10.8)
WBC # BLD AUTO: 18.5 K/UL (ref 4.8–10.8)

## 2025-01-01 PROCEDURE — 94660 CPAP INITIATION&MGMT: CPT

## 2025-01-01 PROCEDURE — 33993 REPOSG PERQ R/L HRT VAD: CPT | Performed by: INTERNAL MEDICINE

## 2025-01-01 PROCEDURE — 6370000000 HC RX 637 (ALT 250 FOR IP): Performed by: INTERNAL MEDICINE

## 2025-01-01 PROCEDURE — 36415 COLL VENOUS BLD VENIPUNCTURE: CPT

## 2025-01-01 PROCEDURE — 82042 OTHER SOURCE ALBUMIN QUAN EA: CPT

## 2025-01-01 PROCEDURE — 74176 CT ABD & PELVIS W/O CONTRAST: CPT

## 2025-01-01 PROCEDURE — B2151ZZ FLUOROSCOPY OF LEFT HEART USING LOW OSMOLAR CONTRAST: ICD-10-PCS | Performed by: INTERNAL MEDICINE

## 2025-01-01 PROCEDURE — C1751 CATH, INF, PER/CENT/MIDLINE: HCPCS | Performed by: INTERNAL MEDICINE

## 2025-01-01 PROCEDURE — 99223 1ST HOSP IP/OBS HIGH 75: CPT | Performed by: INTERNAL MEDICINE

## 2025-01-01 PROCEDURE — 85520 HEPARIN ASSAY: CPT

## 2025-01-01 PROCEDURE — 99152 MOD SED SAME PHYS/QHP 5/>YRS: CPT | Performed by: INTERNAL MEDICINE

## 2025-01-01 PROCEDURE — 83735 ASSAY OF MAGNESIUM: CPT

## 2025-01-01 PROCEDURE — 87449 NOS EACH ORGANISM AG IA: CPT

## 2025-01-01 PROCEDURE — 89051 BODY FLUID CELL COUNT: CPT

## 2025-01-01 PROCEDURE — 93005 ELECTROCARDIOGRAM TRACING: CPT | Performed by: INTERNAL MEDICINE

## 2025-01-01 PROCEDURE — 93460 R&L HRT ART/VENTRICLE ANGIO: CPT | Performed by: INTERNAL MEDICINE

## 2025-01-01 PROCEDURE — 80053 COMPREHEN METABOLIC PANEL: CPT

## 2025-01-01 PROCEDURE — B2111ZZ FLUOROSCOPY OF MULTIPLE CORONARY ARTERIES USING LOW OSMOLAR CONTRAST: ICD-10-PCS | Performed by: INTERNAL MEDICINE

## 2025-01-01 PROCEDURE — 85347 COAGULATION TIME ACTIVATED: CPT

## 2025-01-01 PROCEDURE — 85730 THROMBOPLASTIN TIME PARTIAL: CPT

## 2025-01-01 PROCEDURE — 85027 COMPLETE CBC AUTOMATED: CPT

## 2025-01-01 PROCEDURE — 6360000002 HC RX W HCPCS: Performed by: INTERNAL MEDICINE

## 2025-01-01 PROCEDURE — 93005 ELECTROCARDIOGRAM TRACING: CPT | Performed by: EMERGENCY MEDICINE

## 2025-01-01 PROCEDURE — 2580000003 HC RX 258: Performed by: INTERNAL MEDICINE

## 2025-01-01 PROCEDURE — 84484 ASSAY OF TROPONIN QUANT: CPT

## 2025-01-01 PROCEDURE — 6370000000 HC RX 637 (ALT 250 FOR IP)

## 2025-01-01 PROCEDURE — 2000000000 HC ICU R&B

## 2025-01-01 PROCEDURE — 97535 SELF CARE MNGMENT TRAINING: CPT

## 2025-01-01 PROCEDURE — 97166 OT EVAL MOD COMPLEX 45 MIN: CPT

## 2025-01-01 PROCEDURE — 87493 C DIFF AMPLIFIED PROBE: CPT

## 2025-01-01 PROCEDURE — C1769 GUIDE WIRE: HCPCS | Performed by: INTERNAL MEDICINE

## 2025-01-01 PROCEDURE — 85610 PROTHROMBIN TIME: CPT

## 2025-01-01 PROCEDURE — 87040 BLOOD CULTURE FOR BACTERIA: CPT

## 2025-01-01 PROCEDURE — 83615 LACTATE (LD) (LDH) ENZYME: CPT

## 2025-01-01 PROCEDURE — 99291 CRITICAL CARE FIRST HOUR: CPT | Performed by: INTERNAL MEDICINE

## 2025-01-01 PROCEDURE — 73552 X-RAY EXAM OF FEMUR 2/>: CPT

## 2025-01-01 PROCEDURE — 6360000002 HC RX W HCPCS: Performed by: EMERGENCY MEDICINE

## 2025-01-01 PROCEDURE — 82945 GLUCOSE OTHER FLUID: CPT

## 2025-01-01 PROCEDURE — 3E033XZ INTRODUCTION OF VASOPRESSOR INTO PERIPHERAL VEIN, PERCUTANEOUS APPROACH: ICD-10-PCS | Performed by: INTERNAL MEDICINE

## 2025-01-01 PROCEDURE — 2709999900 HC NON-CHARGEABLE SUPPLY: Performed by: INTERNAL MEDICINE

## 2025-01-01 PROCEDURE — 88305 TISSUE EXAM BY PATHOLOGIST: CPT

## 2025-01-01 PROCEDURE — 85025 COMPLETE CBC W/AUTO DIFF WBC: CPT

## 2025-01-01 PROCEDURE — 87324 CLOSTRIDIUM AG IA: CPT

## 2025-01-01 PROCEDURE — 2500000003 HC RX 250 WO HCPCS: Performed by: INTERNAL MEDICINE

## 2025-01-01 PROCEDURE — 02HA3RZ INSERTION OF SHORT-TERM EXTERNAL HEART ASSIST SYSTEM INTO HEART, PERCUTANEOUS APPROACH: ICD-10-PCS | Performed by: INTERNAL MEDICINE

## 2025-01-01 PROCEDURE — 82150 ASSAY OF AMYLASE: CPT

## 2025-01-01 PROCEDURE — 4A023N8 MEASUREMENT OF CARDIAC SAMPLING AND PRESSURE, BILATERAL, PERCUTANEOUS APPROACH: ICD-10-PCS | Performed by: INTERNAL MEDICINE

## 2025-01-01 PROCEDURE — 02HV33Z INSERTION OF INFUSION DEVICE INTO SUPERIOR VENA CAVA, PERCUTANEOUS APPROACH: ICD-10-PCS | Performed by: INTERNAL MEDICINE

## 2025-01-01 PROCEDURE — 70450 CT HEAD/BRAIN W/O DYE: CPT

## 2025-01-01 PROCEDURE — 83605 ASSAY OF LACTIC ACID: CPT

## 2025-01-01 PROCEDURE — 93306 TTE W/DOPPLER COMPLETE: CPT | Performed by: INTERNAL MEDICINE

## 2025-01-01 PROCEDURE — 76937 US GUIDE VASCULAR ACCESS: CPT | Performed by: INTERNAL MEDICINE

## 2025-01-01 PROCEDURE — 96367 TX/PROPH/DG ADDL SEQ IV INF: CPT

## 2025-01-01 PROCEDURE — 92950 HEART/LUNG RESUSCITATION CPR: CPT | Performed by: INTERNAL MEDICINE

## 2025-01-01 PROCEDURE — 6360000004 HC RX CONTRAST MEDICATION: Performed by: INTERNAL MEDICINE

## 2025-01-01 PROCEDURE — 87070 CULTURE OTHR SPECIMN AEROBIC: CPT

## 2025-01-01 PROCEDURE — 33990 INSJ PERQ VAD L HRT ARTERIAL: CPT | Performed by: INTERNAL MEDICINE

## 2025-01-01 PROCEDURE — C1889 IMPLANT/INSERT DEVICE, NOC: HCPCS | Performed by: INTERNAL MEDICINE

## 2025-01-01 PROCEDURE — 87340 HEPATITIS B SURFACE AG IA: CPT

## 2025-01-01 PROCEDURE — 5A2204Z RESTORATION OF CARDIAC RHYTHM, SINGLE: ICD-10-PCS | Performed by: INTERNAL MEDICINE

## 2025-01-01 PROCEDURE — 84145 PROCALCITONIN (PCT): CPT

## 2025-01-01 PROCEDURE — 2580000003 HC RX 258: Performed by: EMERGENCY MEDICINE

## 2025-01-01 PROCEDURE — 88341 IMHCHEM/IMCYTCHM EA ADD ANTB: CPT

## 2025-01-01 PROCEDURE — 88342 IMHCHEM/IMCYTCHM 1ST ANTB: CPT

## 2025-01-01 PROCEDURE — 36556 INSERT NON-TUNNEL CV CATH: CPT | Performed by: INTERNAL MEDICINE

## 2025-01-01 PROCEDURE — 88112 CYTOPATH CELL ENHANCE TECH: CPT

## 2025-01-01 PROCEDURE — 36556 INSERT NON-TUNNEL CV CATH: CPT

## 2025-01-01 PROCEDURE — 0W9G3ZZ DRAINAGE OF PERITONEAL CAVITY, PERCUTANEOUS APPROACH: ICD-10-PCS | Performed by: INTERNAL MEDICINE

## 2025-01-01 PROCEDURE — 93306 TTE W/DOPPLER COMPLETE: CPT

## 2025-01-01 PROCEDURE — P9047 ALBUMIN (HUMAN), 25%, 50ML: HCPCS | Performed by: INTERNAL MEDICINE

## 2025-01-01 PROCEDURE — 83880 ASSAY OF NATRIURETIC PEPTIDE: CPT

## 2025-01-01 PROCEDURE — P9047 ALBUMIN (HUMAN), 25%, 50ML: HCPCS | Performed by: EMERGENCY MEDICINE

## 2025-01-01 PROCEDURE — 84443 ASSAY THYROID STIM HORMONE: CPT

## 2025-01-01 PROCEDURE — 96365 THER/PROPH/DIAG IV INF INIT: CPT

## 2025-01-01 PROCEDURE — 71045 X-RAY EXAM CHEST 1 VIEW: CPT

## 2025-01-01 PROCEDURE — 2500000003 HC RX 250 WO HCPCS

## 2025-01-01 PROCEDURE — 2700000000 HC OXYGEN THERAPY PER DAY

## 2025-01-01 PROCEDURE — C1894 INTRO/SHEATH, NON-LASER: HCPCS | Performed by: INTERNAL MEDICINE

## 2025-01-01 PROCEDURE — 86803 HEPATITIS C AB TEST: CPT

## 2025-01-01 PROCEDURE — 3E043XZ INTRODUCTION OF VASOPRESSOR INTO CENTRAL VEIN, PERCUTANEOUS APPROACH: ICD-10-PCS | Performed by: INTERNAL MEDICINE

## 2025-01-01 PROCEDURE — 97163 PT EVAL HIGH COMPLEX 45 MIN: CPT

## 2025-01-01 PROCEDURE — 84157 ASSAY OF PROTEIN OTHER: CPT

## 2025-01-01 PROCEDURE — 5A0221D ASSISTANCE WITH CARDIAC OUTPUT USING IMPELLER PUMP, CONTINUOUS: ICD-10-PCS | Performed by: INTERNAL MEDICINE

## 2025-01-01 PROCEDURE — 99233 SBSQ HOSP IP/OBS HIGH 50: CPT | Performed by: INTERNAL MEDICINE

## 2025-01-01 PROCEDURE — 99232 SBSQ HOSP IP/OBS MODERATE 35: CPT | Performed by: SPECIALIST

## 2025-01-01 PROCEDURE — 99285 EMERGENCY DEPT VISIT HI MDM: CPT

## 2025-01-01 PROCEDURE — 6360000002 HC RX W HCPCS

## 2025-01-01 RX ORDER — POTASSIUM CHLORIDE 29.8 MG/ML
20 INJECTION INTRAVENOUS ONCE
Status: COMPLETED | OUTPATIENT
Start: 2025-01-01 | End: 2025-01-01

## 2025-01-01 RX ORDER — DICYCLOMINE HCL 20 MG
20 TABLET ORAL 4 TIMES DAILY PRN
Status: DISCONTINUED | OUTPATIENT
Start: 2025-01-01 | End: 2025-01-01 | Stop reason: HOSPADM

## 2025-01-01 RX ORDER — ALBUMIN (HUMAN) 12.5 G/50ML
25 SOLUTION INTRAVENOUS DAILY PRN
Status: DISCONTINUED | OUTPATIENT
Start: 2025-01-01 | End: 2025-01-01 | Stop reason: HOSPADM

## 2025-01-01 RX ORDER — SODIUM CHLORIDE 0.9 % (FLUSH) 0.9 %
5-40 SYRINGE (ML) INJECTION PRN
Status: DISCONTINUED | OUTPATIENT
Start: 2025-01-01 | End: 2025-01-01 | Stop reason: HOSPADM

## 2025-01-01 RX ORDER — ONDANSETRON HYDROCHLORIDE 4 MG/5ML
4 SOLUTION ORAL EVERY 6 HOURS PRN
COMMUNITY

## 2025-01-01 RX ORDER — HYDROXYZINE HYDROCHLORIDE 25 MG/1
25 TABLET, FILM COATED ORAL ONCE
Status: COMPLETED | OUTPATIENT
Start: 2025-01-01 | End: 2025-01-01

## 2025-01-01 RX ORDER — ENOXAPARIN SODIUM 150 MG/ML
1 INJECTION SUBCUTANEOUS 2 TIMES DAILY
Status: DISCONTINUED | OUTPATIENT
Start: 2025-01-01 | End: 2025-01-01

## 2025-01-01 RX ORDER — LANTHANUM CARBONATE 1000 MG/1
1000 TABLET, CHEWABLE ORAL 2 TIMES DAILY WITH MEALS
COMMUNITY

## 2025-01-01 RX ORDER — HEPARIN SODIUM 1000 [USP'U]/ML
4000 INJECTION, SOLUTION INTRAVENOUS; SUBCUTANEOUS ONCE
Status: DISCONTINUED | OUTPATIENT
Start: 2025-01-01 | End: 2025-01-01

## 2025-01-01 RX ORDER — MIDODRINE HYDROCHLORIDE 10 MG/1
10 TABLET ORAL
Status: DISCONTINUED | OUTPATIENT
Start: 2025-01-01 | End: 2025-01-01 | Stop reason: HOSPADM

## 2025-01-01 RX ORDER — ICOSAPENT ETHYL 1 G/1
2 CAPSULE ORAL 2 TIMES DAILY
COMMUNITY

## 2025-01-01 RX ORDER — 0.9 % SODIUM CHLORIDE 0.9 %
500 INTRAVENOUS SOLUTION INTRAVENOUS ONCE
Status: COMPLETED | OUTPATIENT
Start: 2025-01-01 | End: 2025-01-01

## 2025-01-01 RX ORDER — MIDAZOLAM HYDROCHLORIDE 2 MG/2ML
2 INJECTION, SOLUTION INTRAMUSCULAR; INTRAVENOUS
Status: DISCONTINUED | OUTPATIENT
Start: 2025-01-01 | End: 2025-01-01 | Stop reason: HOSPADM

## 2025-01-01 RX ORDER — ALBUMIN (HUMAN) 12.5 G/50ML
25 SOLUTION INTRAVENOUS ONCE
Status: COMPLETED | OUTPATIENT
Start: 2025-01-01 | End: 2025-01-01

## 2025-01-01 RX ORDER — HEPARIN SODIUM 10000 [USP'U]/100ML
5-30 INJECTION, SOLUTION INTRAVENOUS CONTINUOUS
Status: DISCONTINUED | OUTPATIENT
Start: 2025-01-01 | End: 2025-01-01 | Stop reason: HOSPADM

## 2025-01-01 RX ORDER — NOREPINEPHRINE BITARTRATE 0.03 MG/ML
1-100 INJECTION, SOLUTION INTRAVENOUS CONTINUOUS
Status: DISCONTINUED | OUTPATIENT
Start: 2025-01-01 | End: 2025-01-01

## 2025-01-01 RX ORDER — NOREPINEPHRINE BITARTRATE 0.03 MG/ML
INJECTION, SOLUTION INTRAVENOUS
Status: DISPENSED
Start: 2025-01-01 | End: 2025-01-01

## 2025-01-01 RX ORDER — HEPARIN SODIUM 1000 [USP'U]/ML
4000 INJECTION, SOLUTION INTRAVENOUS; SUBCUTANEOUS ONCE
Status: COMPLETED | OUTPATIENT
Start: 2025-01-01 | End: 2025-01-01

## 2025-01-01 RX ORDER — HEPARIN SODIUM 1000 [USP'U]/ML
INJECTION, SOLUTION INTRAVENOUS; SUBCUTANEOUS PRN
Status: DISCONTINUED | OUTPATIENT
Start: 2025-01-01 | End: 2025-01-01 | Stop reason: HOSPADM

## 2025-01-01 RX ORDER — ACETAMINOPHEN 325 MG/1
650 TABLET ORAL EVERY 6 HOURS PRN
Status: DISCONTINUED | OUTPATIENT
Start: 2025-01-01 | End: 2025-01-01 | Stop reason: HOSPADM

## 2025-01-01 RX ORDER — HEPARIN SODIUM 1000 [USP'U]/ML
2000 INJECTION, SOLUTION INTRAVENOUS; SUBCUTANEOUS PRN
Status: DISCONTINUED | OUTPATIENT
Start: 2025-01-01 | End: 2025-01-01

## 2025-01-01 RX ORDER — SODIUM CHLORIDE 9 MG/ML
INJECTION, SOLUTION INTRAVENOUS PRN
Status: DISCONTINUED | OUTPATIENT
Start: 2025-01-01 | End: 2025-01-01 | Stop reason: HOSPADM

## 2025-01-01 RX ORDER — CINACALCET 30 MG/1
90 TABLET, FILM COATED ORAL EVERY OTHER DAY
COMMUNITY

## 2025-01-01 RX ORDER — ROSUVASTATIN CALCIUM 10 MG/1
10 TABLET, COATED ORAL DAILY
COMMUNITY

## 2025-01-01 RX ORDER — ASPIRIN 81 MG/1
81 TABLET ORAL DAILY
Status: DISCONTINUED | OUTPATIENT
Start: 2025-01-01 | End: 2025-01-01 | Stop reason: HOSPADM

## 2025-01-01 RX ORDER — LIDOCAINE HYDROCHLORIDE 10 MG/ML
INJECTION, SOLUTION INFILTRATION; PERINEURAL PRN
Status: DISCONTINUED | OUTPATIENT
Start: 2025-01-01 | End: 2025-01-01 | Stop reason: HOSPADM

## 2025-01-01 RX ORDER — HEPARIN SODIUM 1000 [USP'U]/ML
4000 INJECTION, SOLUTION INTRAVENOUS; SUBCUTANEOUS PRN
Status: DISCONTINUED | OUTPATIENT
Start: 2025-01-01 | End: 2025-01-01 | Stop reason: HOSPADM

## 2025-01-01 RX ORDER — ACETAMINOPHEN 325 MG/1
650 TABLET ORAL EVERY 4 HOURS PRN
Status: DISCONTINUED | OUTPATIENT
Start: 2025-01-01 | End: 2025-01-01 | Stop reason: HOSPADM

## 2025-01-01 RX ORDER — MECOBALAMIN 5000 MCG
5 TABLET,DISINTEGRATING ORAL NIGHTLY
Status: DISCONTINUED | OUTPATIENT
Start: 2025-01-01 | End: 2025-01-01 | Stop reason: HOSPADM

## 2025-01-01 RX ORDER — CELECOXIB 200 MG/1
200 CAPSULE ORAL DAILY
COMMUNITY

## 2025-01-01 RX ORDER — VANCOMYCIN HYDROCHLORIDE 125 MG/1
250 CAPSULE ORAL 4 TIMES DAILY
Status: DISCONTINUED | OUTPATIENT
Start: 2025-01-01 | End: 2025-01-01 | Stop reason: HOSPADM

## 2025-01-01 RX ORDER — GABAPENTIN 300 MG/1
300 CAPSULE ORAL NIGHTLY
COMMUNITY

## 2025-01-01 RX ORDER — LEVETIRACETAM 250 MG/1
250 TABLET ORAL DAILY
COMMUNITY

## 2025-01-01 RX ORDER — METRONIDAZOLE 500 MG/100ML
500 INJECTION, SOLUTION INTRAVENOUS EVERY 8 HOURS
Status: DISCONTINUED | OUTPATIENT
Start: 2025-01-01 | End: 2025-01-01

## 2025-01-01 RX ORDER — HYDROCORTISONE SODIUM SUCCINATE 100 MG/2ML
50 INJECTION INTRAMUSCULAR; INTRAVENOUS EVERY 6 HOURS
Status: DISCONTINUED | OUTPATIENT
Start: 2025-01-01 | End: 2025-01-01 | Stop reason: HOSPADM

## 2025-01-01 RX ORDER — HEPARIN SODIUM 10000 [USP'U]/100ML
5-30 INJECTION, SOLUTION INTRAVENOUS CONTINUOUS
Status: DISCONTINUED | OUTPATIENT
Start: 2025-01-01 | End: 2025-01-01

## 2025-01-01 RX ORDER — HEPARIN SODIUM 1000 [USP'U]/ML
2000 INJECTION, SOLUTION INTRAVENOUS; SUBCUTANEOUS PRN
Status: DISCONTINUED | OUTPATIENT
Start: 2025-01-01 | End: 2025-01-01 | Stop reason: HOSPADM

## 2025-01-01 RX ORDER — MAGNESIUM SULFATE IN WATER 40 MG/ML
2000 INJECTION, SOLUTION INTRAVENOUS ONCE
Status: COMPLETED | OUTPATIENT
Start: 2025-01-01 | End: 2025-01-01

## 2025-01-01 RX ORDER — NOREPINEPHRINE BITARTRATE 0.03 MG/ML
1-100 INJECTION, SOLUTION INTRAVENOUS CONTINUOUS
Status: DISCONTINUED | OUTPATIENT
Start: 2025-01-01 | End: 2025-01-01 | Stop reason: HOSPADM

## 2025-01-01 RX ORDER — ASPIRIN 81 MG/1
81 TABLET ORAL DAILY
COMMUNITY

## 2025-01-01 RX ORDER — NITROGLYCERIN 20 MG/100ML
INJECTION INTRAVENOUS PRN
Status: DISCONTINUED | OUTPATIENT
Start: 2025-01-01 | End: 2025-01-01 | Stop reason: HOSPADM

## 2025-01-01 RX ORDER — POLYETHYLENE GLYCOL 3350 17 G/17G
17 POWDER, FOR SOLUTION ORAL DAILY PRN
Status: DISCONTINUED | OUTPATIENT
Start: 2025-01-01 | End: 2025-01-01 | Stop reason: HOSPADM

## 2025-01-01 RX ORDER — SODIUM CHLORIDE 0.9 % (FLUSH) 0.9 %
5-40 SYRINGE (ML) INJECTION EVERY 12 HOURS SCHEDULED
Status: DISCONTINUED | OUTPATIENT
Start: 2025-01-01 | End: 2025-01-01 | Stop reason: HOSPADM

## 2025-01-01 RX ORDER — MIDODRINE HYDROCHLORIDE 5 MG/1
5 TABLET ORAL
Status: DISCONTINUED | OUTPATIENT
Start: 2025-01-01 | End: 2025-01-01

## 2025-01-01 RX ORDER — HEPARIN SODIUM 5000 [USP'U]/ML
5000 INJECTION, SOLUTION INTRAVENOUS; SUBCUTANEOUS EVERY 8 HOURS SCHEDULED
Status: DISCONTINUED | OUTPATIENT
Start: 2025-01-01 | End: 2025-01-01

## 2025-01-01 RX ORDER — IOPAMIDOL 612 MG/ML
INJECTION, SOLUTION INTRAVASCULAR PRN
Status: DISCONTINUED | OUTPATIENT
Start: 2025-01-01 | End: 2025-01-01 | Stop reason: HOSPADM

## 2025-01-01 RX ORDER — ALBUMIN (HUMAN) 12.5 G/50ML
50 SOLUTION INTRAVENOUS ONCE
Status: COMPLETED | OUTPATIENT
Start: 2025-01-01 | End: 2025-01-01

## 2025-01-01 RX ORDER — NOREPINEPHRINE BITARTRATE 0.03 MG/ML
INJECTION, SOLUTION INTRAVENOUS CONTINUOUS PRN
Status: COMPLETED | OUTPATIENT
Start: 2025-01-01 | End: 2025-01-01

## 2025-01-01 RX ORDER — ACETAMINOPHEN 650 MG/1
650 SUPPOSITORY RECTAL EVERY 6 HOURS PRN
Status: DISCONTINUED | OUTPATIENT
Start: 2025-01-01 | End: 2025-01-01 | Stop reason: HOSPADM

## 2025-01-01 RX ORDER — SODIUM CHLORIDE 9 MG/ML
INJECTION, SOLUTION INTRAVENOUS CONTINUOUS
Status: DISCONTINUED | OUTPATIENT
Start: 2025-01-01 | End: 2025-01-01 | Stop reason: HOSPADM

## 2025-01-01 RX ORDER — ONDANSETRON 2 MG/ML
4 INJECTION INTRAMUSCULAR; INTRAVENOUS EVERY 6 HOURS PRN
Status: DISCONTINUED | OUTPATIENT
Start: 2025-01-01 | End: 2025-01-01 | Stop reason: HOSPADM

## 2025-01-01 RX ORDER — ONDANSETRON 4 MG/1
4 TABLET, ORALLY DISINTEGRATING ORAL EVERY 8 HOURS PRN
Status: DISCONTINUED | OUTPATIENT
Start: 2025-01-01 | End: 2025-01-01 | Stop reason: HOSPADM

## 2025-01-01 RX ORDER — HEPARIN SODIUM 1000 [USP'U]/ML
4000 INJECTION, SOLUTION INTRAVENOUS; SUBCUTANEOUS PRN
Status: DISCONTINUED | OUTPATIENT
Start: 2025-01-01 | End: 2025-01-01

## 2025-01-01 RX ADMIN — DEXTROSE MONOHYDRATE 150 MG: 50 INJECTION, SOLUTION INTRAVENOUS at 14:45

## 2025-01-01 RX ADMIN — Medication 10 ML: at 19:14

## 2025-01-01 RX ADMIN — HEPARIN SODIUM 4000 UNITS: 1000 INJECTION, SOLUTION INTRAVENOUS; SUBCUTANEOUS at 06:39

## 2025-01-01 RX ADMIN — PIPERACILLIN AND TAZOBACTAM 3375 MG: 3; .375 INJECTION, POWDER, LYOPHILIZED, FOR SOLUTION INTRAVENOUS at 16:31

## 2025-01-01 RX ADMIN — MIDODRINE HYDROCHLORIDE 10 MG: 10 TABLET ORAL at 16:30

## 2025-01-01 RX ADMIN — HEPARIN SODIUM AND DEXTROSE 8 UNITS/KG/HR: 10000; 5 INJECTION INTRAVENOUS at 06:41

## 2025-01-01 RX ADMIN — HYDROXYZINE HYDROCHLORIDE 25 MG: 25 TABLET ORAL at 21:20

## 2025-01-01 RX ADMIN — Medication 10 ML: at 13:56

## 2025-01-01 RX ADMIN — DEXTROSE 1 MG/MIN: 5 SOLUTION INTRAVENOUS at 14:55

## 2025-01-01 RX ADMIN — HEPARIN SODIUM AND DEXTROSE 18 UNITS/KG/HR: 10000; 5 INJECTION INTRAVENOUS at 13:55

## 2025-01-01 RX ADMIN — PIPERACILLIN AND TAZOBACTAM 3375 MG: 3; .375 INJECTION, POWDER, FOR SOLUTION INTRAVENOUS at 20:22

## 2025-01-01 RX ADMIN — Medication 25 MCG/MIN: at 19:14

## 2025-01-01 RX ADMIN — POTASSIUM CHLORIDE 20 MEQ: 29.8 INJECTION, SOLUTION INTRAVENOUS at 15:11

## 2025-01-01 RX ADMIN — MAGNESIUM SULFATE HEPTAHYDRATE 2000 MG: 40 INJECTION, SOLUTION INTRAVENOUS at 05:47

## 2025-01-01 RX ADMIN — ALBUMIN (HUMAN) 50 G: 0.25 INJECTION, SOLUTION INTRAVENOUS at 01:30

## 2025-01-01 RX ADMIN — MIDODRINE HYDROCHLORIDE 5 MG: 5 TABLET ORAL at 03:46

## 2025-01-01 RX ADMIN — MIDODRINE HYDROCHLORIDE 10 MG: 10 TABLET ORAL at 14:04

## 2025-01-01 RX ADMIN — Medication 10 ML: at 08:32

## 2025-01-01 RX ADMIN — SODIUM CHLORIDE 500 ML: 0.9 INJECTION, SOLUTION INTRAVENOUS at 19:22

## 2025-01-01 RX ADMIN — HEPARIN SODIUM 4000 UNITS: 1000 INJECTION, SOLUTION INTRAVENOUS; SUBCUTANEOUS at 23:24

## 2025-01-01 RX ADMIN — Medication 30 MCG/MIN: at 04:38

## 2025-01-01 RX ADMIN — VANCOMYCIN HYDROCHLORIDE 250 MG: 125 CAPSULE ORAL at 21:34

## 2025-01-01 RX ADMIN — WATER 2000 MG: 1 INJECTION INTRAMUSCULAR; INTRAVENOUS; SUBCUTANEOUS at 01:18

## 2025-01-01 RX ADMIN — ASPIRIN 81 MG: 81 TABLET, DELAYED RELEASE ORAL at 07:28

## 2025-01-01 RX ADMIN — MIDODRINE HYDROCHLORIDE 10 MG: 10 TABLET ORAL at 11:49

## 2025-01-01 RX ADMIN — DICYCLOMINE HYDROCHLORIDE 20 MG: 20 TABLET ORAL at 21:20

## 2025-01-01 RX ADMIN — HEPARIN SODIUM 2000 UNITS: 1000 INJECTION INTRAVENOUS; SUBCUTANEOUS at 20:33

## 2025-01-01 RX ADMIN — HYDROCORTISONE SODIUM SUCCINATE 50 MG: 100 INJECTION, POWDER, FOR SOLUTION INTRAMUSCULAR; INTRAVENOUS at 15:11

## 2025-01-01 RX ADMIN — HEPARIN SODIUM 5000 UNITS: 5000 INJECTION INTRAVENOUS; SUBCUTANEOUS at 01:22

## 2025-01-01 RX ADMIN — Medication 20 MCG/MIN: at 06:34

## 2025-01-01 RX ADMIN — Medication 25 MCG/MIN: at 20:00

## 2025-01-01 RX ADMIN — Medication 10 ML: at 01:22

## 2025-01-01 RX ADMIN — Medication 30 MCG/MIN: at 00:47

## 2025-01-01 RX ADMIN — HEPARIN SODIUM 2000 UNITS: 1000 INJECTION INTRAVENOUS; SUBCUTANEOUS at 06:16

## 2025-01-01 RX ADMIN — Medication 10 ML: at 21:00

## 2025-01-01 RX ADMIN — HYDROCORTISONE SODIUM SUCCINATE 50 MG: 100 INJECTION, POWDER, FOR SOLUTION INTRAMUSCULAR; INTRAVENOUS at 20:37

## 2025-01-01 RX ADMIN — CEFEPIME 2000 MG: 2 INJECTION, POWDER, FOR SOLUTION INTRAVENOUS at 19:44

## 2025-01-01 RX ADMIN — Medication 20 MCG/MIN: at 03:46

## 2025-01-01 RX ADMIN — Medication 25 MCG/MIN: at 13:10

## 2025-01-01 RX ADMIN — PIPERACILLIN AND TAZOBACTAM 3375 MG: 3; .375 INJECTION, POWDER, LYOPHILIZED, FOR SOLUTION INTRAVENOUS at 08:03

## 2025-01-01 RX ADMIN — DEXTROSE 1 MG/MIN: 5 SOLUTION INTRAVENOUS at 22:12

## 2025-01-01 RX ADMIN — SODIUM BICARBONATE: 84 INJECTION, SOLUTION INTRAVENOUS at 19:26

## 2025-01-01 RX ADMIN — Medication 5 MG: at 21:20

## 2025-01-01 RX ADMIN — VASOPRESSIN 0.04 UNITS/MIN: 20 INJECTION INTRAVENOUS at 19:59

## 2025-01-01 RX ADMIN — Medication 32 MCG/MIN: at 13:52

## 2025-01-01 RX ADMIN — MIDODRINE HYDROCHLORIDE 10 MG: 10 TABLET ORAL at 07:57

## 2025-01-01 RX ADMIN — PIPERACILLIN AND TAZOBACTAM 3375 MG: 3; .375 INJECTION, POWDER, LYOPHILIZED, FOR SOLUTION INTRAVENOUS at 23:30

## 2025-01-01 RX ADMIN — ALBUMIN (HUMAN) 25 G: 0.25 INJECTION, SOLUTION INTRAVENOUS at 18:33

## 2025-01-01 RX ADMIN — HEPARIN SODIUM AND DEXTROSE 16 UNITS/KG/HR: 10000; 5 INJECTION INTRAVENOUS at 23:27

## 2025-01-01 RX ADMIN — VASOPRESSIN 0.04 UNITS/MIN: 20 INJECTION INTRAVENOUS at 04:29

## 2025-01-01 RX ADMIN — HEPARIN SODIUM AND DEXTROSE 16 UNITS/KG/HR: 10000; 5 INJECTION INTRAVENOUS at 14:38

## 2025-01-01 RX ADMIN — PIPERACILLIN AND TAZOBACTAM 4500 MG: 4; .5 INJECTION, POWDER, LYOPHILIZED, FOR SOLUTION INTRAVENOUS at 10:14

## 2025-01-01 RX ADMIN — ASPIRIN 81 MG: 81 TABLET, DELAYED RELEASE ORAL at 07:57

## 2025-01-01 RX ADMIN — SODIUM CHLORIDE: 0.9 INJECTION, SOLUTION INTRAVENOUS at 19:39

## 2025-01-01 RX ADMIN — MIDODRINE HYDROCHLORIDE 5 MG: 5 TABLET ORAL at 07:28

## 2025-01-01 RX ADMIN — HEPARIN SODIUM 4000 UNITS: 1000 INJECTION, SOLUTION INTRAVENOUS; SUBCUTANEOUS at 14:31

## 2025-01-01 RX ADMIN — VASOPRESSIN 0.04 UNITS/MIN: 20 INJECTION INTRAVENOUS at 20:18

## 2025-01-01 ASSESSMENT — PAIN SCALES - GENERAL
PAINLEVEL_OUTOF10: 0

## 2025-01-01 ASSESSMENT — LIFESTYLE VARIABLES
HOW MANY STANDARD DRINKS CONTAINING ALCOHOL DO YOU HAVE ON A TYPICAL DAY: PATIENT DOES NOT DRINK
HOW OFTEN DO YOU HAVE A DRINK CONTAINING ALCOHOL: NEVER
HOW OFTEN DO YOU HAVE A DRINK CONTAINING ALCOHOL: NEVER
HOW MANY STANDARD DRINKS CONTAINING ALCOHOL DO YOU HAVE ON A TYPICAL DAY: PATIENT DOES NOT DRINK

## 2025-02-14 ENCOUNTER — OFFICE VISIT (OUTPATIENT)
Dept: CARDIOLOGY CLINIC | Age: 70
End: 2025-02-14
Payer: MEDICARE

## 2025-02-14 VITALS
DIASTOLIC BLOOD PRESSURE: 60 MMHG | SYSTOLIC BLOOD PRESSURE: 100 MMHG | HEART RATE: 85 BPM | WEIGHT: 267 LBS | BODY MASS INDEX: 37.24 KG/M2 | OXYGEN SATURATION: 96 %

## 2025-02-14 DIAGNOSIS — Z99.2 DIALYSIS PATIENT (HCC): ICD-10-CM

## 2025-02-14 DIAGNOSIS — Z00.00 ROUTINE ADULT HEALTH MAINTENANCE: Primary | ICD-10-CM

## 2025-02-14 DIAGNOSIS — Q61.3 POLYCYSTIC KIDNEY DISEASE: ICD-10-CM

## 2025-02-14 DIAGNOSIS — G47.33 OSA (OBSTRUCTIVE SLEEP APNEA): ICD-10-CM

## 2025-02-14 DIAGNOSIS — M47.816 LUMBAR SPONDYLOSIS: ICD-10-CM

## 2025-02-14 DIAGNOSIS — E66.01 SEVERE OBESITY (BMI 35.0-39.9) WITH COMORBIDITY: ICD-10-CM

## 2025-02-14 DIAGNOSIS — N18.5 CRI (CHRONIC RENAL INSUFFICIENCY), STAGE 5 (HCC): ICD-10-CM

## 2025-02-14 DIAGNOSIS — I10 ESSENTIAL HYPERTENSION: ICD-10-CM

## 2025-02-14 PROCEDURE — G8417 CALC BMI ABV UP PARAM F/U: HCPCS | Performed by: INTERNAL MEDICINE

## 2025-02-14 PROCEDURE — 93000 ELECTROCARDIOGRAM COMPLETE: CPT | Performed by: INTERNAL MEDICINE

## 2025-02-14 PROCEDURE — 1159F MED LIST DOCD IN RCRD: CPT | Performed by: INTERNAL MEDICINE

## 2025-02-14 PROCEDURE — 1123F ACP DISCUSS/DSCN MKR DOCD: CPT | Performed by: INTERNAL MEDICINE

## 2025-02-14 PROCEDURE — 1036F TOBACCO NON-USER: CPT | Performed by: INTERNAL MEDICINE

## 2025-02-14 PROCEDURE — 3074F SYST BP LT 130 MM HG: CPT | Performed by: INTERNAL MEDICINE

## 2025-02-14 PROCEDURE — 3078F DIAST BP <80 MM HG: CPT | Performed by: INTERNAL MEDICINE

## 2025-02-14 PROCEDURE — 99214 OFFICE O/P EST MOD 30 MIN: CPT | Performed by: INTERNAL MEDICINE

## 2025-02-14 PROCEDURE — G8427 DOCREV CUR MEDS BY ELIG CLIN: HCPCS | Performed by: INTERNAL MEDICINE

## 2025-02-14 PROCEDURE — 3017F COLORECTAL CA SCREEN DOC REV: CPT | Performed by: INTERNAL MEDICINE

## 2025-02-14 ASSESSMENT — ENCOUNTER SYMPTOMS
COUGH: 0
SHORTNESS OF BREATH: 0
NAUSEA: 0
WHEEZING: 0
CHEST TIGHTNESS: 0
BLOOD IN STOOL: 0
RESPIRATORY NEGATIVE: 1
EYES NEGATIVE: 1
GASTROINTESTINAL NEGATIVE: 1
STRIDOR: 0

## 2025-02-14 NOTE — PROGRESS NOTES
OFFICE VISIT         Patient: Ashok Zheng  YOB: 1955  MRN: [id number]    Chief Complaint: dizzy  Chief Complaint   Patient presents with    Follow-up     Yearly  Has had low BP   Half way through dialysis treatment HR has been 40 through 50s       CV Data:  ESRD -HD  10/22 Echo EF 60   5/22 SPEC [facility] no ischemia EF 44  12/23 LE Art - negative.     Subjective/HPI Prior NOH changing. Pt has no CAD PAD. He has Hypotension with HD. No falsl no lbeed. Not very active due to back pain. Has no cp no sob no falls     2/14/25 recent . No cp no sob no falls no bleed. Doing well w Midodrine and HD.       He does take Midodrine PRN on HD days     EKG: SR 83 RBBB    Lives alone.   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      Past Medical History:   Diagnosis Date    Chronic kidney disease     Hemodialysis patient (HCC) 05/2008    MWF    Hyperlipidemia     Hypertension     Renal failure     Sleep apnea        Past Surgical History:   Procedure Laterality Date    COLONOSCOPY      Polyps 2010, none in 2015    COLONOSCOPY N/A 02/06/2020    COLORECTAL CANCER SCREENING, HIGH RISK performed by Janis Dozier MD at [facility]    DIALYSIS FISTULA CREATION Left 2008    FISTULAGRAM Left 11/22/2024    Left arm fistulagram with intervention (dilatation catheter) performed by Dr. Gonzalez    TONSILLECTOMY AND ADENOIDECTOMY      UVULECTOMY         Family History   Problem Relation Age of Onset    Kidney Disease Mother     Cancer Father     Heart Attack Father        Social History     Socioeconomic History    Marital status:    Tobacco Use    Smoking status: Never    Smokeless tobacco: Never   Vaping Use    Vaping status: Never Used   Substance and Sexual Activity    Alcohol use: Not Currently    Drug use: Not Currently    Sexual activity: Yes     Social Determinants of Health     Financial Resource Strain: Low Risk  (3/5/2024)    Overall Financial Resource Strain (CARDIA)     Difficulty

## 2025-02-28 SDOH — ECONOMIC STABILITY: INCOME INSECURITY: IN THE LAST 12 MONTHS, WAS THERE A TIME WHEN YOU WERE NOT ABLE TO PAY THE MORTGAGE OR RENT ON TIME?: NO

## 2025-02-28 SDOH — ECONOMIC STABILITY: FOOD INSECURITY: WITHIN THE PAST 12 MONTHS, THE FOOD YOU BOUGHT JUST DIDN'T LAST AND YOU DIDN'T HAVE MONEY TO GET MORE.: NEVER TRUE

## 2025-02-28 SDOH — ECONOMIC STABILITY: FOOD INSECURITY: WITHIN THE PAST 12 MONTHS, YOU WORRIED THAT YOUR FOOD WOULD RUN OUT BEFORE YOU GOT MONEY TO BUY MORE.: NEVER TRUE

## 2025-02-28 SDOH — ECONOMIC STABILITY: TRANSPORTATION INSECURITY
IN THE PAST 12 MONTHS, HAS THE LACK OF TRANSPORTATION KEPT YOU FROM MEDICAL APPOINTMENTS OR FROM GETTING MEDICATIONS?: NO

## 2025-02-28 ASSESSMENT — PATIENT HEALTH QUESTIONNAIRE - PHQ9
SUM OF ALL RESPONSES TO PHQ QUESTIONS 1-9: 0
SUM OF ALL RESPONSES TO PHQ9 QUESTIONS 1 & 2: 0
2. FEELING DOWN, DEPRESSED OR HOPELESS: NOT AT ALL
SUM OF ALL RESPONSES TO PHQ QUESTIONS 1-9: 0
2. FEELING DOWN, DEPRESSED OR HOPELESS: NOT AT ALL
SUM OF ALL RESPONSES TO PHQ QUESTIONS 1-9: 0
SUM OF ALL RESPONSES TO PHQ QUESTIONS 1-9: 0
1. LITTLE INTEREST OR PLEASURE IN DOING THINGS: NOT AT ALL
1. LITTLE INTEREST OR PLEASURE IN DOING THINGS: NOT AT ALL

## 2025-03-03 ENCOUNTER — OFFICE VISIT (OUTPATIENT)
Age: 70
End: 2025-03-03
Payer: MEDICARE

## 2025-03-03 VITALS
TEMPERATURE: 97.2 F | HEIGHT: 72 IN | HEART RATE: 76 BPM | SYSTOLIC BLOOD PRESSURE: 100 MMHG | RESPIRATION RATE: 18 BRPM | DIASTOLIC BLOOD PRESSURE: 60 MMHG | WEIGHT: 261 LBS | BODY MASS INDEX: 35.35 KG/M2

## 2025-03-03 DIAGNOSIS — G62.9 NEUROPATHY: ICD-10-CM

## 2025-03-03 DIAGNOSIS — N18.31 TYPE 2 DIABETES MELLITUS WITH STAGE 3A CHRONIC KIDNEY DISEASE, WITHOUT LONG-TERM CURRENT USE OF INSULIN (HCC): Primary | ICD-10-CM

## 2025-03-03 DIAGNOSIS — N18.31 TYPE 2 DIABETES MELLITUS WITH STAGE 3A CHRONIC KIDNEY DISEASE, WITHOUT LONG-TERM CURRENT USE OF INSULIN (HCC): ICD-10-CM

## 2025-03-03 DIAGNOSIS — G90.1 DYSAUTONOMIA (HCC): ICD-10-CM

## 2025-03-03 DIAGNOSIS — E11.22 TYPE 2 DIABETES MELLITUS WITH STAGE 3A CHRONIC KIDNEY DISEASE, WITHOUT LONG-TERM CURRENT USE OF INSULIN (HCC): ICD-10-CM

## 2025-03-03 DIAGNOSIS — Z12.5 SCREENING FOR PROSTATE CANCER: ICD-10-CM

## 2025-03-03 DIAGNOSIS — G40.89 OTHER SEIZURES (HCC): ICD-10-CM

## 2025-03-03 DIAGNOSIS — E11.22 TYPE 2 DIABETES MELLITUS WITH STAGE 3A CHRONIC KIDNEY DISEASE, WITHOUT LONG-TERM CURRENT USE OF INSULIN (HCC): Primary | ICD-10-CM

## 2025-03-03 DIAGNOSIS — K51.418 INFLAMMATORY POLYPS OF COLON WITH OTHER COMPLICATION (HCC): ICD-10-CM

## 2025-03-03 LAB
ALBUMIN SERPL-MCNC: 4.2 G/DL (ref 3.5–4.6)
ALP SERPL-CCNC: 174 U/L (ref 35–104)
ALT SERPL-CCNC: 11 U/L (ref 0–41)
ANION GAP SERPL CALCULATED.3IONS-SCNC: 20 MEQ/L (ref 9–15)
AST SERPL-CCNC: 17 U/L (ref 0–40)
BILIRUB SERPL-MCNC: 0.5 MG/DL (ref 0.2–0.7)
BUN SERPL-MCNC: 36 MG/DL (ref 8–23)
CALCIUM SERPL-MCNC: 8.6 MG/DL (ref 8.5–9.9)
CHLORIDE SERPL-SCNC: 94 MEQ/L (ref 95–107)
CHOLEST SERPL-MCNC: 105 MG/DL (ref 0–199)
CO2 SERPL-SCNC: 26 MEQ/L (ref 20–31)
CREAT SERPL-MCNC: 8.22 MG/DL (ref 0.7–1.2)
GLOBULIN SER CALC-MCNC: 2.8 G/DL (ref 2.3–3.5)
GLUCOSE SERPL-MCNC: 97 MG/DL (ref 70–99)
HBA1C MFR BLD: 5.9 %
HDLC SERPL-MCNC: 34 MG/DL (ref 40–59)
LDLC SERPL CALC-MCNC: 44 MG/DL (ref 0–129)
POTASSIUM SERPL-SCNC: 3.5 MEQ/L (ref 3.4–4.9)
PROT SERPL-MCNC: 7 G/DL (ref 6.3–8)
PSA SERPL-MCNC: 2.07 NG/ML (ref 0–4)
SODIUM SERPL-SCNC: 140 MEQ/L (ref 135–144)
TRIGL SERPL-MCNC: 137 MG/DL (ref 0–150)

## 2025-03-03 PROCEDURE — 99213 OFFICE O/P EST LOW 20 MIN: CPT | Performed by: NURSE PRACTITIONER

## 2025-03-03 PROCEDURE — 3074F SYST BP LT 130 MM HG: CPT | Performed by: NURSE PRACTITIONER

## 2025-03-03 PROCEDURE — PBSHW POCT GLYCOSYLATED HEMOGLOBIN (HGB A1C): Performed by: NURSE PRACTITIONER

## 2025-03-03 PROCEDURE — 1123F ACP DISCUSS/DSCN MKR DOCD: CPT | Performed by: NURSE PRACTITIONER

## 2025-03-03 PROCEDURE — 83036 HEMOGLOBIN GLYCOSYLATED A1C: CPT | Performed by: NURSE PRACTITIONER

## 2025-03-03 PROCEDURE — 3044F HG A1C LEVEL LT 7.0%: CPT | Performed by: NURSE PRACTITIONER

## 2025-03-03 PROCEDURE — 99214 OFFICE O/P EST MOD 30 MIN: CPT | Performed by: NURSE PRACTITIONER

## 2025-03-03 PROCEDURE — G8417 CALC BMI ABV UP PARAM F/U: HCPCS | Performed by: NURSE PRACTITIONER

## 2025-03-03 PROCEDURE — G8427 DOCREV CUR MEDS BY ELIG CLIN: HCPCS | Performed by: NURSE PRACTITIONER

## 2025-03-03 PROCEDURE — 1159F MED LIST DOCD IN RCRD: CPT | Performed by: NURSE PRACTITIONER

## 2025-03-03 PROCEDURE — 1036F TOBACCO NON-USER: CPT | Performed by: NURSE PRACTITIONER

## 2025-03-03 PROCEDURE — 3017F COLORECTAL CA SCREEN DOC REV: CPT | Performed by: NURSE PRACTITIONER

## 2025-03-03 PROCEDURE — 3078F DIAST BP <80 MM HG: CPT | Performed by: NURSE PRACTITIONER

## 2025-03-03 PROCEDURE — 2022F DILAT RTA XM EVC RTNOPTHY: CPT | Performed by: NURSE PRACTITIONER

## 2025-03-03 RX ORDER — CINACALCET 90 MG/1
TABLET, FILM COATED ORAL
COMMUNITY
Start: 2025-02-22

## 2025-03-03 RX ORDER — ICOSAPENT ETHYL 1000 MG/1
2 CAPSULE ORAL NIGHTLY
Qty: 180 CAPSULE | Refills: 3 | Status: SHIPPED | OUTPATIENT
Start: 2025-03-03

## 2025-03-03 SDOH — ECONOMIC STABILITY: FOOD INSECURITY: WITHIN THE PAST 12 MONTHS, THE FOOD YOU BOUGHT JUST DIDN'T LAST AND YOU DIDN'T HAVE MONEY TO GET MORE.: NEVER TRUE

## 2025-03-03 SDOH — ECONOMIC STABILITY: FOOD INSECURITY: WITHIN THE PAST 12 MONTHS, YOU WORRIED THAT YOUR FOOD WOULD RUN OUT BEFORE YOU GOT MONEY TO BUY MORE.: NEVER TRUE

## 2025-03-03 NOTE — PROGRESS NOTES
(around 6/3/2025) for PCP follow-up.    The patient (or guardian, if applicable) and other individuals in attendance with the patient were advised that Artificial Intelligence will be utilized during this visit to record, process the conversation to generate a clinical note and to support improvement of the AI technology. The patient (or guardian, if applicable) and other individuals in attendance at the appointment consented to the use of AI, including the recording.      An electronic signature was used to authenticate this note.    --Kiana Robert, YOGESH - CNP

## 2025-03-04 LAB
ANISOCYTOSIS BLD QL SMEAR: ABNORMAL
BASOPHILS # BLD: 0.1 K/UL (ref 0–0.2)
BASOPHILS NFR BLD: 1.1 %
BURR CELLS: ABNORMAL
DACRYOCYTES BLD QL SMEAR: ABNORMAL
EOSINOPHIL # BLD: 0.1 K/UL (ref 0–0.7)
EOSINOPHIL NFR BLD: 1.8 %
ERYTHROCYTE [DISTWIDTH] IN BLOOD BY AUTOMATED COUNT: 19.4 % (ref 11.5–14.5)
HCT VFR BLD AUTO: 46.9 % (ref 42–52)
HGB BLD-MCNC: 13.2 G/DL (ref 14–18)
LYMPHOCYTES # BLD: 1 K/UL (ref 1–4.8)
LYMPHOCYTES NFR BLD: 13.9 %
MCH RBC QN AUTO: 23.5 PG (ref 27–31.3)
MCHC RBC AUTO-ENTMCNC: 28.1 % (ref 33–37)
MCV RBC AUTO: 83.5 FL (ref 79–92.2)
MICROCYTES BLD QL SMEAR: ABNORMAL
MONOCYTES # BLD: 0.8 K/UL (ref 0.2–0.8)
MONOCYTES NFR BLD: 10.7 %
NEUTROPHILS # BLD: 5.3 K/UL (ref 1.4–6.5)
NEUTS SEG NFR BLD: 72.2 %
OVALOCYTES BLD QL SMEAR: ABNORMAL
PLATELET # BLD AUTO: 321 K/UL (ref 130–400)
POIKILOCYTOSIS BLD QL SMEAR: ABNORMAL
POLYCHROMASIA BLD QL SMEAR: ABNORMAL
RBC # BLD AUTO: 5.62 M/UL (ref 4.7–6.1)
SLIDE REVIEW: ABNORMAL
WBC # BLD AUTO: 7.3 K/UL (ref 4.8–10.8)

## 2025-03-21 ENCOUNTER — TELEMEDICINE (OUTPATIENT)
Age: 70
End: 2025-03-21
Payer: MEDICARE

## 2025-03-21 DIAGNOSIS — Z00.00 MEDICARE ANNUAL WELLNESS VISIT, SUBSEQUENT: Primary | ICD-10-CM

## 2025-03-21 PROCEDURE — 1159F MED LIST DOCD IN RCRD: CPT | Performed by: NURSE PRACTITIONER

## 2025-03-21 PROCEDURE — G0439 PPPS, SUBSEQ VISIT: HCPCS | Performed by: NURSE PRACTITIONER

## 2025-03-21 PROCEDURE — 3017F COLORECTAL CA SCREEN DOC REV: CPT | Performed by: NURSE PRACTITIONER

## 2025-03-21 PROCEDURE — 1123F ACP DISCUSS/DSCN MKR DOCD: CPT | Performed by: NURSE PRACTITIONER

## 2025-03-21 ASSESSMENT — PATIENT HEALTH QUESTIONNAIRE - PHQ9
SUM OF ALL RESPONSES TO PHQ QUESTIONS 1-9: 0
SUM OF ALL RESPONSES TO PHQ QUESTIONS 1-9: 0
2. FEELING DOWN, DEPRESSED OR HOPELESS: NOT AT ALL
SUM OF ALL RESPONSES TO PHQ QUESTIONS 1-9: 0
1. LITTLE INTEREST OR PLEASURE IN DOING THINGS: NOT AT ALL
SUM OF ALL RESPONSES TO PHQ QUESTIONS 1-9: 0

## 2025-03-21 NOTE — PROGRESS NOTES
tablet Take 1 tablet by mouth Yes Tisha Rasmussen MD   CPAP Machine MISC by Does not apply route Disp Cap, Hose,Face Mask and filter every three months Yes Joseph Villavicencio MD   Respiratory Therapy Supplies (NEBULIZER/TUBING/MOUTHPIECE) KIT Uses nightly Yes Joseph Villavicencio MD   B Complex-C-Folic Acid (KAYE-LEILANI) TABS TAKE 1 TABLET BY MOUTH EVERY DAY Yes Tisha Rasmussen MD   aspirin 81 MG EC tablet Take by mouth Yes Tisha Rasmussen MD   gabapentin (NEURONTIN) 300 MG capsule 2 to be taken at night  Vladislav Bokoer MD       CareTeam (Including outside providers/suppliers regularly involved in providing care):   Patient Care Team:  Joseph Villavicencio MD as PCP - General (Family Medicine)  Joseph Villavicencio MD as PCP - Empaneled Provider  Vladislav Booker MD as Consulting Physician (Neurology)     Recommendations for Preventive Services Due: see orders and patient instructions/AVS.  Recommended screening schedule for the next 5-10 years is provided to the patient in written form: see Patient Instructions/AVS.     Reviewed and updated this visit:  Allergies  Meds  Sexual Hx               Ashok Zheng, was evaluated through a synchronous (real-time) audio-video encounter. The patient (or guardian if applicable) is aware that this is a billable service, which includes applicable co-pays. This Virtual Visit was conducted with patient's (and/or legal guardian's) consent. Patient identification was verified, and a caregiver was present when appropriate.   The patient was located at Home: [id number] [phone]  Provider was located at Facility (Appt Dept): [facility] [place] [address]  Confirm you are appropriately licensed, registered, or certified to deliver care in the state where the patient is located as indicated above. If you are not or unsure, please re-schedule the visit: Yes, I confirm.

## 2025-03-21 NOTE — PATIENT INSTRUCTIONS
doctor if you have any problems.  Where can you learn more?  Go to [email or web address]/patientEd and enter F075 to learn more about \"A Healthy Heart: Care Instructions.\"  Current as of: July 31, 2024  Content Version: 14.4  © 9320-5439 [employer].   Care instructions adapted under license by [employer]. If you have questions about a medical condition or this instruction, always ask your healthcare professional. [employer] disclaims any warranty or liability for your use of this information.    Personalized Preventive Plan for Ashok Zheng - 3/21/2025  Medicare offers a range of preventive health benefits. Some of the tests and screenings are paid in full while other may be subject to a deductible, co-insurance, and/or copay.  Some of these benefits include a comprehensive review of your medical history including lifestyle, illnesses that may run in your family, and various assessments and screenings as appropriate.  After reviewing your medical record and screening and assessments performed today your provider may have ordered immunizations, labs, imaging, and/or referrals for you.  A list of these orders (if applicable) as well as your Preventive Care list are included within your After Visit Summary for your review.

## 2025-05-19 RX ORDER — ICOSAPENT ETHYL 1000 MG/1
2 CAPSULE ORAL NIGHTLY
Qty: 180 CAPSULE | Refills: 3 | OUTPATIENT
Start: 2025-05-19

## 2025-05-21 RX ORDER — ICOSAPENT ETHYL 1000 MG/1
2 CAPSULE ORAL NIGHTLY
Qty: 180 CAPSULE | Refills: 3 | Status: SHIPPED | OUTPATIENT
Start: 2025-05-21

## 2025-06-05 DIAGNOSIS — G62.9 NEUROPATHY: ICD-10-CM

## 2025-06-05 RX ORDER — LEVETIRACETAM 250 MG/1
TABLET ORAL
Qty: 270 TABLET | Refills: 3 | Status: SHIPPED | OUTPATIENT
Start: 2025-06-05

## 2025-06-05 RX ORDER — GABAPENTIN 300 MG/1
CAPSULE ORAL
Qty: 180 CAPSULE | Refills: 3 | Status: SHIPPED | OUTPATIENT
Start: 2025-06-05 | End: 2025-07-05

## 2025-06-05 RX ORDER — CELECOXIB 200 MG/1
200 CAPSULE ORAL DAILY
Qty: 90 CAPSULE | Refills: 1 | Status: SHIPPED | OUTPATIENT
Start: 2025-06-05

## 2025-06-05 NOTE — TELEPHONE ENCOUNTER
Requesting medication refill. Please approve or deny this request.    Rx requested:  Requested Prescriptions     Pending Prescriptions Disp Refills    gabapentin (NEURONTIN) 300 MG capsule 180 capsule 3     Si to be taken at night    levETIRAcetam (KEPPRA) 250 MG tablet 270 tablet 3     Sig: TAKE 1 AND 1/2 TABLETS BY MOUTH  TWICE DAILY         Last Office Visit:   2024      Next Visit Date:  Future Appointments   Date Time Provider Department Center   9/3/2025 11:30 AM Joseph Villavicencio MD [facility]   2025  1:15 PM Vladislav Booker MD [facility] -   2026  1:00 PM Ab Kent MD [facility]

## 2025-07-14 ENCOUNTER — TELEPHONE (OUTPATIENT)
Age: 70
End: 2025-07-14

## 2025-07-14 NOTE — TELEPHONE ENCOUNTER
PT called -has questions on dosage margie  VASCEPA 1 g CAPS capsule     RX written to take 2 at bedtime-     PT states he normally takes 2 in am & 2 at bedtime.  He has been taking 4 a day-- did not realize it was written for only 2 a day-     Please advise and PT will need new RX also

## 2025-07-17 RX ORDER — ICOSAPENT ETHYL 1000 MG/1
2 CAPSULE ORAL 2 TIMES DAILY
Qty: 360 CAPSULE | Refills: 3 | Status: SHIPPED | OUTPATIENT
Start: 2025-07-17

## 2025-08-01 ENCOUNTER — TELEPHONE (OUTPATIENT)
Age: 70
End: 2025-08-01

## 2025-08-01 DIAGNOSIS — R19.7 DIARRHEA, UNSPECIFIED TYPE: Primary | ICD-10-CM

## 2025-08-01 NOTE — TELEPHONE ENCOUNTER
PT called is requesting a referral to Gastro- Dr Brewer.    His bowels have been liquid since Feb, after antibodic  use-   Was in [facility] in March    Has appt scheduled 9/3- if he needs to be seen please give him a call to schedule- did not want to schedule today

## 2025-08-14 PROBLEM — R18.8 ASCITES OF LIVER: Status: ACTIVE | Noted: 2025-01-01

## 2025-08-15 PROBLEM — K74.60 CIRRHOSIS OF LIVER WITH ASCITES (HCC): Status: ACTIVE | Noted: 2025-01-01

## 2025-08-15 PROBLEM — R29.6 FREQUENT FALLS: Status: ACTIVE | Noted: 2025-01-01

## 2025-08-15 PROBLEM — R18.8 CIRRHOSIS OF LIVER WITH ASCITES (HCC): Status: ACTIVE | Noted: 2025-01-01

## 2025-08-15 PROBLEM — R65.21 SEPTIC SHOCK (HCC): Status: ACTIVE | Noted: 2025-01-01

## 2025-08-15 PROBLEM — A41.9 SEPTIC SHOCK (HCC): Status: ACTIVE | Noted: 2025-01-01

## 2025-08-16 ENCOUNTER — APPOINTMENT (OUTPATIENT)
Dept: RADIOLOGY | Facility: HOSPITAL | Age: 70
DRG: 308 | End: 2025-08-16
Payer: MEDICARE

## 2025-08-16 ENCOUNTER — HOSPITAL ENCOUNTER (INPATIENT)
Facility: HOSPITAL | Age: 70
LOS: 1 days | DRG: 308 | End: 2025-08-17
Attending: HOSPITALIST | Admitting: HOSPITALIST
Payer: MEDICARE

## 2025-08-16 DIAGNOSIS — R57.0 CARDIOGENIC SHOCK (MULTI): Primary | ICD-10-CM

## 2025-08-16 DIAGNOSIS — I44.30 UNSPECIFIED ATRIOVENTRICULAR BLOCK: ICD-10-CM

## 2025-08-16 PROBLEM — N18.6 ESRD ON HEMODIALYSIS (HCC): Status: ACTIVE | Noted: 2025-01-01

## 2025-08-16 PROBLEM — I49.9 CARDIAC ARRHYTHMIA: Status: ACTIVE | Noted: 2025-01-01

## 2025-08-16 PROBLEM — I47.21 TORSADES DE POINTES (HCC): Status: ACTIVE | Noted: 2025-01-01

## 2025-08-16 PROBLEM — Z99.2 ESRD ON HEMODIALYSIS (HCC): Status: ACTIVE | Noted: 2025-01-01

## 2025-08-16 PROCEDURE — 85027 COMPLETE CBC AUTOMATED: CPT

## 2025-08-16 PROCEDURE — 36415 COLL VENOUS BLD VENIPUNCTURE: CPT

## 2025-08-16 PROCEDURE — 2020000001 HC ICU ROOM DAILY

## 2025-08-16 PROCEDURE — 84132 ASSAY OF SERUM POTASSIUM: CPT

## 2025-08-16 PROCEDURE — 83036 HEMOGLOBIN GLYCOSYLATED A1C: CPT

## 2025-08-16 PROCEDURE — 85007 BL SMEAR W/DIFF WBC COUNT: CPT

## 2025-08-17 ENCOUNTER — APPOINTMENT (OUTPATIENT)
Dept: RADIOLOGY | Facility: HOSPITAL | Age: 70
DRG: 308 | End: 2025-08-17
Payer: MEDICARE

## 2025-08-17 ENCOUNTER — APPOINTMENT (OUTPATIENT)
Dept: CARDIOLOGY | Facility: HOSPITAL | Age: 70
DRG: 308 | End: 2025-08-17
Payer: MEDICARE

## 2025-08-17 VITALS
HEART RATE: 91 BPM | WEIGHT: 255.73 LBS | BODY MASS INDEX: 34.64 KG/M2 | DIASTOLIC BLOOD PRESSURE: 80 MMHG | SYSTOLIC BLOOD PRESSURE: 104 MMHG | HEIGHT: 72 IN | OXYGEN SATURATION: 94 % | RESPIRATION RATE: 20 BRPM | TEMPERATURE: 98.2 F

## 2025-08-17 PROBLEM — R57.0 CARDIOGENIC SHOCK (MULTI): Status: ACTIVE | Noted: 2025-08-17

## 2025-08-17 LAB
ANION GAP BLDA CALCULATED.4IONS-SCNC: 12 MMO/L (ref 10–25)
ANION GAP BLDA CALCULATED.4IONS-SCNC: 14 MMO/L (ref 10–25)
ANION GAP BLDA CALCULATED.4IONS-SCNC: 17 MMO/L (ref 10–25)
ANION GAP BLDMV CALCULATED.4IONS-SCNC: 11 MMO/L (ref 10–25)
ANION GAP BLDMV CALCULATED.4IONS-SCNC: 14 MMO/L (ref 10–25)
ANION GAP BLDMV CALCULATED.4IONS-SCNC: 17 MMO/L (ref 10–25)
ANION GAP BLDMV CALCULATED.4IONS-SCNC: 9 MMO/L (ref 10–25)
APTT PPP: 39 SECONDS (ref 26–36)
BASE EXCESS BLDA CALC-SCNC: -5.2 MMOL/L (ref -2–3)
BASE EXCESS BLDA CALC-SCNC: 1.4 MMOL/L (ref -2–3)
BASE EXCESS BLDA CALC-SCNC: 2.1 MMOL/L (ref -2–3)
BASE EXCESS BLDMV CALC-SCNC: -2.6 MMOL/L (ref -2–3)
BASE EXCESS BLDMV CALC-SCNC: -4.9 MMOL/L (ref -2–3)
BASE EXCESS BLDMV CALC-SCNC: 0.8 MMOL/L (ref -2–3)
BASE EXCESS BLDMV CALC-SCNC: 4.5 MMOL/L (ref -2–3)
BASOPHILS # BLD MANUAL: 0.17 X10*3/UL (ref 0–0.1)
BASOPHILS NFR BLD MANUAL: 0.9 %
BLASTS # BLD MANUAL: 0 X10*3/UL
BLASTS NFR BLD MANUAL: 0 %
BODY TEMPERATURE: 37 DEGREES CELSIUS
C DIF TOX TCDA+TCDB STL QL NAA+PROBE: DETECTED
C DIFF TOX A+B STL QL IA: NEGATIVE
CA-I BLDA-SCNC: 1.37 MMOL/L (ref 1.1–1.33)
CA-I BLDA-SCNC: 1.4 MMOL/L (ref 1.1–1.33)
CA-I BLDA-SCNC: 1.43 MMOL/L (ref 1.1–1.33)
CA-I BLDMV-SCNC: 1.17 MMOL/L (ref 1.1–1.33)
CA-I BLDMV-SCNC: 1.4 MMOL/L (ref 1.1–1.33)
CA-I BLDMV-SCNC: 1.42 MMOL/L (ref 1.1–1.33)
CA-I BLDMV-SCNC: 1.43 MMOL/L (ref 1.1–1.33)
CHLORIDE BLD-SCNC: 92 MMOL/L (ref 98–107)
CHLORIDE BLD-SCNC: 94 MMOL/L (ref 98–107)
CHLORIDE BLD-SCNC: 95 MMOL/L (ref 98–107)
CHLORIDE BLD-SCNC: 99 MMOL/L (ref 98–107)
CHLORIDE BLDA-SCNC: 93 MMOL/L (ref 98–107)
CHLORIDE BLDA-SCNC: 95 MMOL/L (ref 98–107)
CHLORIDE BLDA-SCNC: 96 MMOL/L (ref 98–107)
EJECTION FRACTION: 13 %
EOSINOPHIL # BLD MANUAL: 0 X10*3/UL (ref 0–0.4)
EOSINOPHIL NFR BLD MANUAL: 0 %
ERYTHROCYTE [DISTWIDTH] IN BLOOD BY AUTOMATED COUNT: 20.3 % (ref 11.5–14.5)
EST. AVERAGE GLUCOSE BLD GHB EST-MCNC: 117 MG/DL
GLUCOSE BLD MANUAL STRIP-MCNC: 176 MG/DL (ref 74–99)
GLUCOSE BLD-MCNC: 164 MG/DL (ref 74–99)
GLUCOSE BLD-MCNC: 220 MG/DL (ref 74–99)
GLUCOSE BLD-MCNC: 293 MG/DL (ref 74–99)
GLUCOSE BLD-MCNC: 394 MG/DL (ref 74–99)
GLUCOSE BLDA-MCNC: 220 MG/DL (ref 74–99)
GLUCOSE BLDA-MCNC: 282 MG/DL (ref 74–99)
GLUCOSE BLDA-MCNC: 332 MG/DL (ref 74–99)
HBA1C MFR BLD: 5.7 % (ref ?–5.7)
HCO3 BLDA-SCNC: 20.6 MMOL/L (ref 22–26)
HCO3 BLDA-SCNC: 24.7 MMOL/L (ref 22–26)
HCO3 BLDA-SCNC: 25.5 MMOL/L (ref 22–26)
HCO3 BLDMV-SCNC: 22.8 MMOL/L (ref 22–26)
HCO3 BLDMV-SCNC: 23.2 MMOL/L (ref 22–26)
HCO3 BLDMV-SCNC: 25.3 MMOL/L (ref 22–26)
HCO3 BLDMV-SCNC: 29.2 MMOL/L (ref 22–26)
HCT VFR BLD AUTO: 44.8 % (ref 41–52)
HCT VFR BLD EST: 38 % (ref 41–52)
HCT VFR BLD EST: 39 % (ref 41–52)
HGB BLD-MCNC: 13.6 G/DL (ref 13.5–17.5)
HGB BLDA-MCNC: 12.5 G/DL (ref 13.5–17.5)
HGB BLDA-MCNC: 12.6 G/DL (ref 13.5–17.5)
HGB BLDA-MCNC: 12.8 G/DL (ref 13.5–17.5)
HGB BLDMV-MCNC: 12.8 G/DL (ref 13.5–17.5)
HGB BLDMV-MCNC: 12.9 G/DL (ref 13.5–17.5)
HGB BLDMV-MCNC: 13 G/DL (ref 13.5–17.5)
HGB BLDMV-MCNC: 13 G/DL (ref 13.5–17.5)
IMM GRANULOCYTES # BLD AUTO: 0.15 X10*3/UL (ref 0–0.5)
IMM GRANULOCYTES NFR BLD AUTO: 0.8 % (ref 0–0.9)
INHALED O2 CONCENTRATION: 100 %
INHALED O2 CONCENTRATION: 21 %
INHALED O2 CONCENTRATION: 28 %
INR PPP: 1.3 (ref 0.9–1.1)
LACTATE BLDA-SCNC: 2.2 MMOL/L (ref 0.4–2)
LACTATE BLDA-SCNC: 3 MMOL/L (ref 0.4–2)
LACTATE BLDA-SCNC: 4.9 MMOL/L (ref 0.4–2)
LACTATE BLDMV-SCNC: 2.3 MMOL/L (ref 0.4–2)
LACTATE BLDMV-SCNC: 2.4 MMOL/L (ref 0.4–2)
LACTATE BLDMV-SCNC: 4.7 MMOL/L (ref 0.4–2)
LACTATE BLDMV-SCNC: 6.6 MMOL/L (ref 0.4–2)
LYMPHOCYTES # BLD MANUAL: 0.34 X10*3/UL (ref 0.8–3)
LYMPHOCYTES NFR BLD MANUAL: 1.8 %
MCH RBC QN AUTO: 23.7 PG (ref 26–34)
MCHC RBC AUTO-ENTMCNC: 30.4 G/DL (ref 32–36)
MCV RBC AUTO: 78 FL (ref 80–100)
METAMYELOCYTES # BLD MANUAL: 0 X10*3/UL
METAMYELOCYTES NFR BLD MANUAL: 0 %
MONOCYTES # BLD MANUAL: 2.33 X10*3/UL (ref 0.05–0.8)
MONOCYTES NFR BLD MANUAL: 12.4 %
MYELOCYTES # BLD MANUAL: 0 X10*3/UL
MYELOCYTES NFR BLD MANUAL: 0 %
NEUTROPHILS # BLD MANUAL: 15.96 X10*3/UL (ref 1.6–5.5)
NEUTS BAND # BLD MANUAL: 0 X10*3/UL (ref 0–0.5)
NEUTS BAND NFR BLD MANUAL: 0 %
NEUTS SEG # BLD MANUAL: 15.96 X10*3/UL (ref 1.6–5)
NEUTS SEG NFR BLD MANUAL: 84.9 %
NRBC BLD MANUAL-RTO: 0 % (ref 0–0)
NRBC BLD-RTO: 0 /100 WBCS (ref 0–0)
OXYHGB MFR BLDA: 90.3 % (ref 94–98)
OXYHGB MFR BLDA: 92.3 % (ref 94–98)
OXYHGB MFR BLDA: 95.3 % (ref 94–98)
OXYHGB MFR BLDMV: 52.7 % (ref 45–75)
OXYHGB MFR BLDMV: 54.8 % (ref 45–75)
OXYHGB MFR BLDMV: 55.8 % (ref 45–75)
OXYHGB MFR BLDMV: 57.1 % (ref 45–75)
PCO2 BLDA: 34 MM HG (ref 38–42)
PCO2 BLDA: 35 MM HG (ref 38–42)
PCO2 BLDA: 40 MM HG (ref 38–42)
PCO2 BLDMV: 39 MM HG (ref 41–51)
PCO2 BLDMV: 43 MM HG (ref 41–51)
PCO2 BLDMV: 43 MM HG (ref 41–51)
PCO2 BLDMV: 52 MM HG (ref 41–51)
PH BLDA: 7.32 PH (ref 7.38–7.42)
PH BLDA: 7.47 PH (ref 7.38–7.42)
PH BLDA: 7.47 PH (ref 7.38–7.42)
PH BLDMV: 7.25 PH (ref 7.33–7.43)
PH BLDMV: 7.34 PH (ref 7.33–7.43)
PH BLDMV: 7.42 PH (ref 7.33–7.43)
PH BLDMV: 7.44 PH (ref 7.33–7.43)
PLASMA CELLS # BLD MANUAL: 0 X10*3/UL
PLASMA CELLS NFR BLD MANUAL: 0 %
PLATELET # BLD AUTO: 452 X10*3/UL (ref 150–450)
PO2 BLDA: 135 MM HG (ref 85–95)
PO2 BLDA: 77 MM HG (ref 85–95)
PO2 BLDA: 86 MM HG (ref 85–95)
PO2 BLDMV: 39 MM HG (ref 35–45)
PO2 BLDMV: 41 MM HG (ref 35–45)
PO2 BLDMV: 42 MM HG (ref 35–45)
PO2 BLDMV: 43 MM HG (ref 35–45)
POTASSIUM BLDA-SCNC: 4.3 MMOL/L (ref 3.5–5.3)
POTASSIUM BLDA-SCNC: 5.1 MMOL/L (ref 3.5–5.3)
POTASSIUM BLDA-SCNC: 5.3 MMOL/L (ref 3.5–5.3)
POTASSIUM BLDMV-SCNC: 3.9 MMOL/L (ref 3.5–5.3)
POTASSIUM BLDMV-SCNC: 4.5 MMOL/L (ref 3.5–5.3)
POTASSIUM BLDMV-SCNC: 4.7 MMOL/L (ref 3.5–5.3)
POTASSIUM BLDMV-SCNC: 5.3 MMOL/L (ref 3.5–5.3)
PROMYELOCYTES # BLD MANUAL: 0 X10*3/UL
PROMYELOCYTES NFR BLD MANUAL: 0 %
PROTHROMBIN TIME: 14.2 SECONDS (ref 9.8–12.4)
RBC # BLD AUTO: 5.75 X10*6/UL (ref 4.5–5.9)
RBC MORPH BLD: ABNORMAL
SAO2 % BLDA: 100 % (ref 94–100)
SAO2 % BLDA: 95 % (ref 94–100)
SAO2 % BLDA: 98 % (ref 94–100)
SAO2 % BLDMV: 55 % (ref 45–75)
SAO2 % BLDMV: 58 % (ref 45–75)
SAO2 % BLDMV: 58 % (ref 45–75)
SAO2 % BLDMV: 59 % (ref 45–75)
SODIUM BLDA-SCNC: 127 MMOL/L (ref 136–145)
SODIUM BLDA-SCNC: 127 MMOL/L (ref 136–145)
SODIUM BLDA-SCNC: 129 MMOL/L (ref 136–145)
SODIUM BLDMV-SCNC: 126 MMOL/L (ref 136–145)
SODIUM BLDMV-SCNC: 127 MMOL/L (ref 136–145)
SODIUM BLDMV-SCNC: 128 MMOL/L (ref 136–145)
SODIUM BLDMV-SCNC: 131 MMOL/L (ref 136–145)
TOTAL CELLS COUNTED BLD: 113
UFH PPP CHRO-ACNC: 0.2 IU/ML (ref ?–1.1)
UFH PPP CHRO-ACNC: 0.2 IU/ML (ref ?–1.1)
VARIANT LYMPHS # BLD MANUAL: 0 X10*3/UL (ref 0–0.3)
VARIANT LYMPHS NFR BLD: 0 %
WBC # BLD AUTO: 18.8 X10*3/UL (ref 4.4–11.3)
WBC OTHER # BLD MANUAL: 0 X10*3/UL
WBC OTHER NFR BLD MANUAL: 0 %

## 2025-08-17 PROCEDURE — 99221 1ST HOSP IP/OBS SF/LOW 40: CPT | Performed by: INTERNAL MEDICINE

## 2025-08-17 PROCEDURE — C1894 INTRO/SHEATH, NON-LASER: HCPCS | Performed by: HOSPITALIST

## 2025-08-17 PROCEDURE — 71045 X-RAY EXAM CHEST 1 VIEW: CPT

## 2025-08-17 PROCEDURE — 36415 COLL VENOUS BLD VENIPUNCTURE: CPT

## 2025-08-17 PROCEDURE — 37799 UNLISTED PX VASCULAR SURGERY: CPT

## 2025-08-17 PROCEDURE — 93325 DOPPLER ECHO COLOR FLOW MAPG: CPT

## 2025-08-17 PROCEDURE — 33210 INSERT ELECTRD/PM CATH SNGL: CPT | Performed by: HOSPITALIST

## 2025-08-17 PROCEDURE — 2500000005 HC RX 250 GENERAL PHARMACY W/O HCPCS

## 2025-08-17 PROCEDURE — 36620 INSERTION CATHETER ARTERY: CPT | Performed by: HOSPITALIST

## 2025-08-17 PROCEDURE — 84132 ASSAY OF SERUM POTASSIUM: CPT | Performed by: STUDENT IN AN ORGANIZED HEALTH CARE EDUCATION/TRAINING PROGRAM

## 2025-08-17 PROCEDURE — 2500000004 HC RX 250 GENERAL PHARMACY W/ HCPCS (ALT 636 FOR OP/ED)

## 2025-08-17 PROCEDURE — 2500000001 HC RX 250 WO HCPCS SELF ADMINISTERED DRUGS (ALT 637 FOR MEDICARE OP)

## 2025-08-17 PROCEDURE — 99291 CRITICAL CARE FIRST HOUR: CPT

## 2025-08-17 PROCEDURE — 93308 TTE F-UP OR LMTD: CPT

## 2025-08-17 PROCEDURE — 2500000004 HC RX 250 GENERAL PHARMACY W/ HCPCS (ALT 636 FOR OP/ED): Performed by: HOSPITALIST

## 2025-08-17 PROCEDURE — 85520 HEPARIN ASSAY: CPT

## 2025-08-17 PROCEDURE — 82947 ASSAY GLUCOSE BLOOD QUANT: CPT

## 2025-08-17 PROCEDURE — 84132 ASSAY OF SERUM POTASSIUM: CPT | Performed by: HOSPITALIST

## 2025-08-17 PROCEDURE — 5A1213Z PERFORMANCE OF CARDIAC PACING, INTERMITTENT: ICD-10-PCS | Performed by: HOSPITALIST

## 2025-08-17 PROCEDURE — C1752 CATH,HEMODIALYSIS,SHORT-TERM: HCPCS | Performed by: HOSPITALIST

## 2025-08-17 PROCEDURE — 36620 INSERTION CATHETER ARTERY: CPT | Mod: GC | Performed by: STUDENT IN AN ORGANIZED HEALTH CARE EDUCATION/TRAINING PROGRAM

## 2025-08-17 PROCEDURE — 2500000004 HC RX 250 GENERAL PHARMACY W/ HCPCS (ALT 636 FOR OP/ED): Performed by: STUDENT IN AN ORGANIZED HEALTH CARE EDUCATION/TRAINING PROGRAM

## 2025-08-17 PROCEDURE — 76937 US GUIDE VASCULAR ACCESS: CPT | Performed by: HOSPITALIST

## 2025-08-17 PROCEDURE — 5A1935Z RESPIRATORY VENTILATION, LESS THAN 24 CONSECUTIVE HOURS: ICD-10-PCS | Performed by: INTERNAL MEDICINE

## 2025-08-17 PROCEDURE — 85610 PROTHROMBIN TIME: CPT

## 2025-08-17 PROCEDURE — 87493 C DIFF AMPLIFIED PROBE: CPT

## 2025-08-17 PROCEDURE — 84132 ASSAY OF SERUM POTASSIUM: CPT

## 2025-08-17 PROCEDURE — 2720000007 HC OR 272 NO HCPCS: Performed by: HOSPITALIST

## 2025-08-17 PROCEDURE — C1756 CATH, PACING, TRANSESOPH: HCPCS | Performed by: HOSPITALIST

## 2025-08-17 PROCEDURE — 2500000002 HC RX 250 W HCPCS SELF ADMINISTERED DRUGS (ALT 637 FOR MEDICARE OP, ALT 636 FOR OP/ED)

## 2025-08-17 PROCEDURE — 94660 CPAP INITIATION&MGMT: CPT

## 2025-08-17 PROCEDURE — 2780000003 HC OR 278 NO HCPCS: Performed by: HOSPITALIST

## 2025-08-17 PROCEDURE — 5A1D90Z PERFORMANCE OF URINARY FILTRATION, CONTINUOUS, GREATER THAN 18 HOURS PER DAY: ICD-10-PCS

## 2025-08-17 PROCEDURE — 87324 CLOSTRIDIUM AG IA: CPT

## 2025-08-17 RX ORDER — HYDROMORPHONE HYDROCHLORIDE 0.2 MG/ML
0.2 INJECTION INTRAMUSCULAR; INTRAVENOUS; SUBCUTANEOUS
Status: DISCONTINUED | OUTPATIENT
Start: 2025-08-17 | End: 2025-08-17 | Stop reason: HOSPADM

## 2025-08-17 RX ORDER — NOREPINEPHRINE BITARTRATE/D5W 8 MG/250ML
0-.2 PLASTIC BAG, INJECTION (ML) INTRAVENOUS CONTINUOUS
Status: DISCONTINUED | OUTPATIENT
Start: 2025-08-17 | End: 2025-08-17

## 2025-08-17 RX ORDER — LIDOCAINE HYDROCHLORIDE 10 MG/ML
0.5 INJECTION, SOLUTION EPIDURAL; INFILTRATION; INTRACAUDAL; PERINEURAL ONCE
Status: DISCONTINUED | OUTPATIENT
Start: 2025-08-17 | End: 2025-08-17

## 2025-08-17 RX ORDER — CEFTRIAXONE 2 G/50ML
2 INJECTION, SOLUTION INTRAVENOUS EVERY 24 HOURS
Status: DISCONTINUED | OUTPATIENT
Start: 2025-08-17 | End: 2025-08-17

## 2025-08-17 RX ORDER — LANTHANUM CARBONATE 500 MG/1
1000 TABLET, CHEWABLE ORAL
Status: DISCONTINUED | OUTPATIENT
Start: 2025-08-17 | End: 2025-08-17

## 2025-08-17 RX ORDER — VANCOMYCIN HYDROCHLORIDE 1 G/20ML
INJECTION, POWDER, LYOPHILIZED, FOR SOLUTION INTRAVENOUS DAILY PRN
Status: DISCONTINUED | OUTPATIENT
Start: 2025-08-17 | End: 2025-08-17

## 2025-08-17 RX ORDER — LEVETIRACETAM 500 MG/1
250 TABLET ORAL DAILY
Status: DISCONTINUED | OUTPATIENT
Start: 2025-08-17 | End: 2025-08-17 | Stop reason: HOSPADM

## 2025-08-17 RX ORDER — FENTANYL CITRATE-0.9 % NACL/PF 10 MCG/ML
0-300 PLASTIC BAG, INJECTION (ML) INTRAVENOUS CONTINUOUS
Status: DISCONTINUED | OUTPATIENT
Start: 2025-08-17 | End: 2025-08-17 | Stop reason: HOSPADM

## 2025-08-17 RX ORDER — ETOMIDATE 2 MG/ML
INJECTION INTRAVENOUS
Status: DISCONTINUED
Start: 2025-08-17 | End: 2025-08-17 | Stop reason: HOSPADM

## 2025-08-17 RX ORDER — LIDOCAINE HYDROCHLORIDE ANHYDROUS AND DEXTROSE MONOHYDRATE .8; 5 G/100ML; G/100ML
INJECTION, SOLUTION INTRAVENOUS
Status: DISCONTINUED
Start: 2025-08-17 | End: 2025-08-17 | Stop reason: HOSPADM

## 2025-08-17 RX ORDER — MIDAZOLAM HYDROCHLORIDE 2 MG/2ML
2 INJECTION, SOLUTION INTRAMUSCULAR; INTRAVENOUS AS NEEDED
Status: DISCONTINUED | OUTPATIENT
Start: 2025-08-17 | End: 2025-08-17 | Stop reason: HOSPADM

## 2025-08-17 RX ORDER — DOBUTAMINE HYDROCHLORIDE 400 MG/100ML
2.5-2 INJECTION INTRAVENOUS CONTINUOUS
Status: DISCONTINUED | OUTPATIENT
Start: 2025-08-17 | End: 2025-08-17

## 2025-08-17 RX ORDER — MIDAZOLAM HYDROCHLORIDE 2 MG/2ML
INJECTION, SOLUTION INTRAMUSCULAR; INTRAVENOUS
Status: DISCONTINUED
Start: 2025-08-17 | End: 2025-08-17 | Stop reason: HOSPADM

## 2025-08-17 RX ORDER — DEXTROSE 50 % IN WATER (D50W) INTRAVENOUS SYRINGE
12.5
Status: DISCONTINUED | OUTPATIENT
Start: 2025-08-17 | End: 2025-08-17

## 2025-08-17 RX ORDER — INSULIN LISPRO 100 [IU]/ML
0-5 INJECTION, SOLUTION INTRAVENOUS; SUBCUTANEOUS EVERY 4 HOURS
Status: DISCONTINUED | OUTPATIENT
Start: 2025-08-17 | End: 2025-08-17 | Stop reason: HOSPADM

## 2025-08-17 RX ORDER — MIDAZOLAM HYDROCHLORIDE 1 MG/ML
0-20 INJECTION, SOLUTION INTRAVENOUS CONTINUOUS
Status: DISCONTINUED | OUTPATIENT
Start: 2025-08-17 | End: 2025-08-17 | Stop reason: HOSPADM

## 2025-08-17 RX ORDER — PHENYLEPHRINE 10 MG/250 ML(40 MCG/ML)IN 0.9 % SOD.CHLORIDE INTRAVENOUS
0-9 CONTINUOUS
Status: DISCONTINUED | OUTPATIENT
Start: 2025-08-17 | End: 2025-08-17

## 2025-08-17 RX ORDER — EPINEPHRINE IN 0.9 % SOD CHLOR 4MG/250ML
PLASTIC BAG, INJECTION (ML) INTRAVENOUS
Status: COMPLETED
Start: 2025-08-17 | End: 2025-08-17

## 2025-08-17 RX ORDER — ICOSAPENT ETHYL 1 G/1
2 CAPSULE ORAL 2 TIMES DAILY
Status: DISCONTINUED | OUTPATIENT
Start: 2025-08-17 | End: 2025-08-17

## 2025-08-17 RX ORDER — DEXTROSE 50 % IN WATER (D50W) INTRAVENOUS SYRINGE
25
Status: DISCONTINUED | OUTPATIENT
Start: 2025-08-17 | End: 2025-08-17

## 2025-08-17 RX ORDER — LIDOCAINE HYDROCHLORIDE ANHYDROUS AND DEXTROSE MONOHYDRATE .8; 5 G/100ML; G/100ML
1 INJECTION, SOLUTION INTRAVENOUS CONTINUOUS
Status: DISCONTINUED | OUTPATIENT
Start: 2025-08-17 | End: 2025-08-17 | Stop reason: HOSPADM

## 2025-08-17 RX ORDER — MIDAZOLAM HYDROCHLORIDE 1 MG/ML
INJECTION, SOLUTION INTRAVENOUS
Status: DISCONTINUED
Start: 2025-08-17 | End: 2025-08-17 | Stop reason: HOSPADM

## 2025-08-17 RX ORDER — LANTHANUM CARBONATE 1000 MG/1
1000 TABLET, CHEWABLE ORAL
COMMUNITY

## 2025-08-17 RX ORDER — MIDAZOLAM HYDROCHLORIDE 2 MG/2ML
INJECTION, SOLUTION INTRAMUSCULAR; INTRAVENOUS
Status: COMPLETED
Start: 2025-08-17 | End: 2025-08-17

## 2025-08-17 RX ORDER — ATROPINE SULFATE 0.1 MG/ML
INJECTION INTRAVENOUS
Status: DISCONTINUED
Start: 2025-08-17 | End: 2025-08-17 | Stop reason: HOSPADM

## 2025-08-17 RX ORDER — ROCURONIUM BROMIDE 10 MG/ML
INJECTION, SOLUTION INTRAVENOUS
Status: DISCONTINUED
Start: 2025-08-17 | End: 2025-08-17 | Stop reason: HOSPADM

## 2025-08-17 RX ORDER — HEPARIN SODIUM 10000 [USP'U]/100ML
0-4000 INJECTION, SOLUTION INTRAVENOUS CONTINUOUS
Status: DISCONTINUED | OUTPATIENT
Start: 2025-08-17 | End: 2025-08-17

## 2025-08-17 RX ORDER — CINACALCET 30 MG/1
30 TABLET, FILM COATED ORAL
Status: DISCONTINUED | OUTPATIENT
Start: 2025-08-17 | End: 2025-08-17

## 2025-08-17 RX ORDER — PHENYLEPHRINE HCL IN 0.9% NACL 0.4MG/10ML
SYRINGE (ML) INTRAVENOUS
Status: DISCONTINUED
Start: 2025-08-17 | End: 2025-08-17 | Stop reason: HOSPADM

## 2025-08-17 RX ORDER — EPINEPHRINE IN 0.9 % SOD CHLOR 4MG/250ML
PLASTIC BAG, INJECTION (ML) INTRAVENOUS CONTINUOUS PRN
Status: COMPLETED | OUTPATIENT
Start: 2025-08-17 | End: 2025-08-17

## 2025-08-17 RX ORDER — MIDAZOLAM HYDROCHLORIDE 2 MG/2ML
2 INJECTION, SOLUTION INTRAMUSCULAR; INTRAVENOUS ONCE
Status: COMPLETED | OUTPATIENT
Start: 2025-08-17 | End: 2025-08-17

## 2025-08-17 RX ORDER — FENTANYL CITRATE-0.9 % NACL/PF 10 MCG/ML
PLASTIC BAG, INJECTION (ML) INTRAVENOUS
Status: COMPLETED
Start: 2025-08-17 | End: 2025-08-17

## 2025-08-17 RX ORDER — EPINEPHRINE 0.1 MG/ML
INJECTION INTRACARDIAC; INTRAVENOUS CODE/TRAUMA/SEDATION MEDICATION
Status: COMPLETED | OUTPATIENT
Start: 2025-08-17 | End: 2025-08-17

## 2025-08-17 RX ORDER — ASPIRIN 81 MG/1
81 TABLET ORAL DAILY
Status: DISCONTINUED | OUTPATIENT
Start: 2025-08-17 | End: 2025-08-17

## 2025-08-17 RX ORDER — GABAPENTIN 300 MG/1
300 CAPSULE ORAL NIGHTLY
Status: DISCONTINUED | OUTPATIENT
Start: 2025-08-17 | End: 2025-08-17 | Stop reason: HOSPADM

## 2025-08-17 RX ORDER — LIDOCAINE HYDROCHLORIDE 10 MG/ML
1 INJECTION, SOLUTION EPIDURAL; INFILTRATION; INTRACAUDAL; PERINEURAL ONCE
Status: DISCONTINUED | OUTPATIENT
Start: 2025-08-17 | End: 2025-08-17 | Stop reason: HOSPADM

## 2025-08-17 RX ORDER — ROSUVASTATIN CALCIUM 40 MG/1
40 TABLET, COATED ORAL NIGHTLY
Status: DISCONTINUED | OUTPATIENT
Start: 2025-08-17 | End: 2025-08-17

## 2025-08-17 RX ADMIN — MIDAZOLAM IN SODIUM CHLORIDE 2 MG/HR: 1 INJECTION INTRAVENOUS at 11:18

## 2025-08-17 RX ADMIN — Medication: at 12:45

## 2025-08-17 RX ADMIN — MIDAZOLAM HYDROCHLORIDE 2 MG: 2 INJECTION, SOLUTION INTRAMUSCULAR; INTRAVENOUS at 10:54

## 2025-08-17 RX ADMIN — EPINEPHRINE IN SODIUM CHLORIDE: 16 INJECTION INTRAVENOUS at 09:27

## 2025-08-17 RX ADMIN — HEPARIN SODIUM 1800 UNITS/HR: 10000 INJECTION, SOLUTION INTRAVENOUS at 01:42

## 2025-08-17 RX ADMIN — DOBUTAMINE HYDROCHLORIDE 2.5 MCG/KG/MIN: 400 INJECTION INTRAVENOUS at 01:52

## 2025-08-17 RX ADMIN — Medication 1 DOSE: at 09:20

## 2025-08-17 RX ADMIN — Medication: at 06:02

## 2025-08-17 RX ADMIN — Medication 25 MCG/HR: at 10:49

## 2025-08-17 RX ADMIN — MIDAZOLAM HYDROCHLORIDE 2 MG: 1 INJECTION, SOLUTION INTRAMUSCULAR; INTRAVENOUS at 10:54

## 2025-08-17 RX ADMIN — Medication 0.3 MCG/KG/MIN: at 09:15

## 2025-08-17 RX ADMIN — SODIUM BICARBONATE 10 ML/HR: 84 INJECTION, SOLUTION INTRAVENOUS at 03:21

## 2025-08-17 RX ADMIN — EPINEPHRINE 1 MG: 0.1 INJECTION INTRAVENOUS at 09:22

## 2025-08-17 RX ADMIN — Medication 0.12 MCG/KG/MIN: at 01:40

## 2025-08-17 RX ADMIN — CEFTRIAXONE 2 G: 2 INJECTION, SOLUTION INTRAVENOUS at 01:54

## 2025-08-17 RX ADMIN — AMIODARONE HYDROCHLORIDE 1 MG/MIN: 1.8 INJECTION, SOLUTION INTRAVENOUS at 04:31

## 2025-08-17 RX ADMIN — GABAPENTIN 300 MG: 300 CAPSULE ORAL at 03:20

## 2025-08-17 RX ADMIN — AMIODARONE HYDROCHLORIDE 1 MG/MIN: 1.8 INJECTION, SOLUTION INTRAVENOUS at 13:16

## 2025-08-17 RX ADMIN — PHENYLEPHRINE-NACL IV SOLUTION 10 MG/250ML-0.9% 0.5 MCG/KG/MIN: 10-0.9/25 SOLUTION at 09:08

## 2025-08-17 RX ADMIN — VASOPRESSIN 0.03 UNITS/MIN: 0.2 INJECTION INTRAVENOUS at 01:41

## 2025-08-17 RX ADMIN — ICOSAPENT ETHYL 2 G: 1 CAPSULE ORAL at 03:20

## 2025-08-17 RX ADMIN — HEPARIN SODIUM 2200 UNITS/HR: 10000 INJECTION, SOLUTION INTRAVENOUS at 13:16

## 2025-08-17 RX ADMIN — ROSUVASTATIN CALCIUM 40 MG: 40 TABLET, FILM COATED ORAL at 03:20

## 2025-08-17 RX ADMIN — Medication: at 03:25

## 2025-08-17 RX ADMIN — AMIODARONE HYDROCHLORIDE 1 MG/MIN: 1.8 INJECTION, SOLUTION INTRAVENOUS at 03:19

## 2025-08-17 SDOH — SOCIAL STABILITY: SOCIAL INSECURITY: WITHIN THE LAST YEAR, HAVE YOU BEEN HUMILIATED OR EMOTIONALLY ABUSED IN OTHER WAYS BY YOUR PARTNER OR EX-PARTNER?: NO

## 2025-08-17 SDOH — ECONOMIC STABILITY: HOUSING INSECURITY: IN THE LAST 12 MONTHS, WAS THERE A TIME WHEN YOU WERE NOT ABLE TO PAY THE MORTGAGE OR RENT ON TIME?: NO

## 2025-08-17 SDOH — ECONOMIC STABILITY: FOOD INSECURITY: HOW HARD IS IT FOR YOU TO PAY FOR THE VERY BASICS LIKE FOOD, HOUSING, MEDICAL CARE, AND HEATING?: NOT VERY HARD

## 2025-08-17 SDOH — SOCIAL STABILITY: SOCIAL INSECURITY
WITHIN THE LAST YEAR, HAVE YOU BEEN RAPED OR FORCED TO HAVE ANY KIND OF SEXUAL ACTIVITY BY YOUR PARTNER OR EX-PARTNER?: NO

## 2025-08-17 SDOH — ECONOMIC STABILITY: HOUSING INSECURITY: AT ANY TIME IN THE PAST 12 MONTHS, WERE YOU HOMELESS OR LIVING IN A SHELTER (INCLUDING NOW)?: NO

## 2025-08-17 SDOH — SOCIAL STABILITY: SOCIAL INSECURITY: WITHIN THE LAST YEAR, HAVE YOU BEEN AFRAID OF YOUR PARTNER OR EX-PARTNER?: NO

## 2025-08-17 SDOH — ECONOMIC STABILITY: INCOME INSECURITY: IN THE PAST 12 MONTHS HAS THE ELECTRIC, GAS, OIL, OR WATER COMPANY THREATENED TO SHUT OFF SERVICES IN YOUR HOME?: NO

## 2025-08-17 SDOH — ECONOMIC STABILITY: FOOD INSECURITY: WITHIN THE PAST 12 MONTHS, YOU WORRIED THAT YOUR FOOD WOULD RUN OUT BEFORE YOU GOT THE MONEY TO BUY MORE.: NEVER TRUE

## 2025-08-17 SDOH — SOCIAL STABILITY: SOCIAL INSECURITY
WITHIN THE LAST YEAR, HAVE YOU BEEN KICKED, HIT, SLAPPED, OR OTHERWISE PHYSICALLY HURT BY YOUR PARTNER OR EX-PARTNER?: NO

## 2025-08-17 SDOH — SOCIAL STABILITY: SOCIAL INSECURITY: DO YOU FEEL UNSAFE GOING BACK TO THE PLACE WHERE YOU ARE LIVING?: NO

## 2025-08-17 SDOH — SOCIAL STABILITY: SOCIAL INSECURITY: DO YOU FEEL ANYONE HAS EXPLOITED OR TAKEN ADVANTAGE OF YOU FINANCIALLY OR OF YOUR PERSONAL PROPERTY?: NO

## 2025-08-17 SDOH — ECONOMIC STABILITY: HOUSING INSECURITY: IN THE PAST 12 MONTHS, HOW MANY TIMES HAVE YOU MOVED WHERE YOU WERE LIVING?: 0

## 2025-08-17 SDOH — SOCIAL STABILITY: SOCIAL INSECURITY: DOES ANYONE TRY TO KEEP YOU FROM HAVING/CONTACTING OTHER FRIENDS OR DOING THINGS OUTSIDE YOUR HOME?: NO

## 2025-08-17 SDOH — ECONOMIC STABILITY: FOOD INSECURITY: WITHIN THE PAST 12 MONTHS, THE FOOD YOU BOUGHT JUST DIDN'T LAST AND YOU DIDN'T HAVE MONEY TO GET MORE.: NEVER TRUE

## 2025-08-17 SDOH — SOCIAL STABILITY: SOCIAL INSECURITY: ABUSE: ADULT

## 2025-08-17 SDOH — ECONOMIC STABILITY: TRANSPORTATION INSECURITY: IN THE PAST 12 MONTHS, HAS LACK OF TRANSPORTATION KEPT YOU FROM MEDICAL APPOINTMENTS OR FROM GETTING MEDICATIONS?: NO

## 2025-08-17 SDOH — SOCIAL STABILITY: SOCIAL INSECURITY: HAVE YOU HAD THOUGHTS OF HARMING ANYONE ELSE?: NO

## 2025-08-17 SDOH — SOCIAL STABILITY: SOCIAL INSECURITY: HAVE YOU HAD ANY THOUGHTS OF HARMING ANYONE ELSE?: NO

## 2025-08-17 SDOH — SOCIAL STABILITY: SOCIAL INSECURITY: ARE YOU OR HAVE YOU BEEN THREATENED OR ABUSED PHYSICALLY, EMOTIONALLY, OR SEXUALLY BY ANYONE?: NO

## 2025-08-17 SDOH — SOCIAL STABILITY: SOCIAL INSECURITY: HAS ANYONE EVER THREATENED TO HURT YOUR FAMILY OR YOUR PETS?: NO

## 2025-08-17 SDOH — SOCIAL STABILITY: SOCIAL INSECURITY: WERE YOU ABLE TO COMPLETE ALL THE BEHAVIORAL HEALTH SCREENINGS?: YES

## 2025-08-17 SDOH — SOCIAL STABILITY: SOCIAL INSECURITY: ARE THERE ANY APPARENT SIGNS OF INJURIES/BEHAVIORS THAT COULD BE RELATED TO ABUSE/NEGLECT?: NO

## 2025-08-17 ASSESSMENT — PAIN - FUNCTIONAL ASSESSMENT
PAIN_FUNCTIONAL_ASSESSMENT: 0-10

## 2025-08-17 ASSESSMENT — LIFESTYLE VARIABLES
HOW MANY STANDARD DRINKS CONTAINING ALCOHOL DO YOU HAVE ON A TYPICAL DAY: PATIENT DOES NOT DRINK
SKIP TO QUESTIONS 9-10: 1
HOW OFTEN DO YOU HAVE A DRINK CONTAINING ALCOHOL: NEVER
AUDIT-C TOTAL SCORE: 0
AUDIT-C TOTAL SCORE: 0
HOW OFTEN DO YOU HAVE 6 OR MORE DRINKS ON ONE OCCASION: NEVER

## 2025-08-17 ASSESSMENT — PATIENT HEALTH QUESTIONNAIRE - PHQ9
2. FEELING DOWN, DEPRESSED OR HOPELESS: NOT AT ALL
1. LITTLE INTEREST OR PLEASURE IN DOING THINGS: NOT AT ALL
SUM OF ALL RESPONSES TO PHQ9 QUESTIONS 1 & 2: 0

## 2025-08-17 ASSESSMENT — ACTIVITIES OF DAILY LIVING (ADL)
PATIENT'S MEMORY ADEQUATE TO SAFELY COMPLETE DAILY ACTIVITIES?: YES
HEARING - LEFT EAR: FUNCTIONAL
TOILETING: INDEPENDENT
BATHING: INDEPENDENT
LACK_OF_TRANSPORTATION: NO
DRESSING YOURSELF: INDEPENDENT
WALKS IN HOME: INDEPENDENT
JUDGMENT_ADEQUATE_SAFELY_COMPLETE_DAILY_ACTIVITIES: YES
FEEDING YOURSELF: INDEPENDENT
HEARING - RIGHT EAR: FUNCTIONAL
ADEQUATE_TO_COMPLETE_ADL: YES
GROOMING: INDEPENDENT

## 2025-08-17 ASSESSMENT — PAIN SCALES - GENERAL
PAINLEVEL_OUTOF10: 5 - MODERATE PAIN
PAINLEVEL_OUTOF10: 0 - NO PAIN

## 2025-08-17 ASSESSMENT — COGNITIVE AND FUNCTIONAL STATUS - GENERAL
MOBILITY SCORE: 24
PATIENT BASELINE BEDBOUND: NO
DAILY ACTIVITIY SCORE: 24
DAILY ACTIVITIY SCORE: 24
MOBILITY SCORE: 24

## 2025-08-18 LAB
ANION GAP BLDA CALCULATED.4IONS-SCNC: 15 MMO/L (ref 10–25)
ANION GAP BLDV CALCULATED.4IONS-SCNC: 12 MMOL/L (ref 10–25)
BASE EXCESS BLDA CALC-SCNC: -5.7 MMOL/L (ref -2–3)
BASE EXCESS BLDV CALC-SCNC: 1.9 MMOL/L (ref -2–3)
BODY SURFACE AREA: 2.43 M2
BODY TEMPERATURE: 37 DEGREES CELSIUS
BODY TEMPERATURE: 37 DEGREES CELSIUS
CA-I BLDA-SCNC: 1.42 MMOL/L (ref 1.1–1.33)
CA-I BLDV-SCNC: 1.35 MMOL/L (ref 1.1–1.33)
CHLORIDE BLDA-SCNC: 94 MMOL/L (ref 98–107)
CHLORIDE BLDV-SCNC: 94 MMOL/L (ref 98–107)
GLUCOSE BLDA-MCNC: 413 MG/DL (ref 74–99)
GLUCOSE BLDV-MCNC: 197 MG/DL (ref 74–99)
HCO3 BLDA-SCNC: 20.7 MMOL/L (ref 22–26)
HCO3 BLDV-SCNC: 26.6 MMOL/L (ref 22–26)
HCT VFR BLD EST: 38 % (ref 41–52)
HCT VFR BLD EST: 42 % (ref 41–52)
HGB BLDA-MCNC: 12.8 G/DL (ref 13.5–17.5)
HGB BLDV-MCNC: 14 G/DL (ref 13.5–17.5)
INHALED O2 CONCENTRATION: 100 %
INHALED O2 CONCENTRATION: 21 %
LACTATE BLDA-SCNC: 6.4 MMOL/L (ref 0.4–2)
LACTATE BLDV-SCNC: 3.5 MMOL/L (ref 0.4–2)
OXYHGB MFR BLDA: 94.3 % (ref 94–98)
OXYHGB MFR BLDV: 70.1 % (ref 45–75)
PCO2 BLDA: 43 MM HG (ref 38–42)
PCO2 BLDV: 41 MM HG (ref 41–51)
PH BLDA: 7.29 PH (ref 7.38–7.42)
PH BLDV: 7.42 PH (ref 7.33–7.43)
PO2 BLDA: 103 MM HG (ref 85–95)
PO2 BLDV: 51 MM HG (ref 35–45)
POTASSIUM BLDA-SCNC: 5.1 MMOL/L (ref 3.5–5.3)
POTASSIUM BLDV-SCNC: 4.6 MMOL/L (ref 3.5–5.3)
SAO2 % BLDA: 99 % (ref 94–100)
SAO2 % BLDV: 73 % (ref 45–75)
SODIUM BLDA-SCNC: 125 MMOL/L (ref 136–145)
SODIUM BLDV-SCNC: 128 MMOL/L (ref 136–145)

## 2025-08-19 LAB
BACTERIA BLD CULT ORG #2: NORMAL
BACTERIA BLD CULT: NORMAL

## 2025-08-21 LAB — BACTERIA FLD AEROBE CULT: NORMAL

## 2025-08-22 LAB — ECHO BSA: 2.43 M2

## (undated) DEVICE — KIT CATHETER PUMP INSERTION INTRACARDIAC IMPELLA CP

## (undated) DEVICE — STOPCOCK FLUID MGMT 3 W 1050 PSI IV ROTATABLE HI PRESSURE

## (undated) DEVICE — INTRODUCER SHTH 4FR CANN L11CM DIL TIP 25MM RED TUNGSTEN

## (undated) DEVICE — GUIDEWIRE VASC L260CM DIA0.035IN TIP L3MM STD EXCHG PTFE J

## (undated) DEVICE — CATHETER DIAG 5FR L100CM LUMN ID0.047IN JL4 CRV 0 SIDE H

## (undated) DEVICE — CATHETER COR DIAG PIGTAILS PIG 145 CRV 5FR 110CM 6 SIDE H

## (undated) DEVICE — GLOVE ORANGE PI 7   MSG9070

## (undated) DEVICE — KIT INTRO 6FR L10CM MINI WIRE L45CM DIA0.021IN NDL 21GA ANT

## (undated) DEVICE — Device

## (undated) DEVICE — INTRODUCER SHTH 0.018 IN 4 FRX70 CM 21 GAX7 CM KT MIC VSI

## (undated) DEVICE — GLOVE SURG SZ 6 THK91MIL LTX FREE SYN POLYISOPRENE ANTI

## (undated) DEVICE — DRAPE SURG BRACH STRL

## (undated) DEVICE — CATHETER DIAG 5FR L100CM LUMN ID0.047IN JR4 CRV 0 SIDE H

## (undated) DEVICE — KIT MFLD ISOLATN NACL CNTRST PRT TBNG SPIK W/ PRSS TRNSDUC

## (undated) DEVICE — INTRODUCER, FAST-CATH, 8 FR X 12 CM, W/LUER-LOCK

## (undated) DEVICE — CATHETER, PACING, PACEL, FLOW DIRECTED, 5 FR X 110 CM

## (undated) DEVICE — KIT ANGIO W/ AT P65 PREM HND CTRL FOR CNTRST DEL ANGIOTOUCH

## (undated) DEVICE — DEVICE COMPR LNG 27 CM VASC BND

## (undated) DEVICE — CATHETER THRMDIL 7FR L110CM STD PULM ART 4 INFUS LUMN SWN

## (undated) DEVICE — INTRODUCER SHTH 7FR CANN L11CM DIL TIP 35MM ORNG TUNGSTEN

## (undated) DEVICE — ACCESS KIT, S-MAK MINI, 4FR 10CM 0.018IN 40CM, NT/PT, ECHO ENHANCE NEEDLE

## (undated) DEVICE — DRAPE SURG ST 3/4 SHEET W53XL77IN STD POLYPR 3 QTR DISP

## (undated) DEVICE — SET ADMIN 1 W STPCOCK FIX FEM CONN L BOR TBNG L1IN

## (undated) DEVICE — SHEATH INTRO 6FR L10CM MINI GWIRE L45CM 0.035IN COR KINK

## (undated) DEVICE — CONTAINER SPEC 4OZ POLYPR GRAD SCR LID LEAK RESIST ID LBL

## (undated) DEVICE — CABLE, PACING PATIENT BIPOLAR 8'

## (undated) DEVICE — CABLE, PATIENT SAFETY, 12FT, STERILE, DISP